# Patient Record
Sex: FEMALE | Race: WHITE | Employment: OTHER | URBAN - METROPOLITAN AREA
[De-identification: names, ages, dates, MRNs, and addresses within clinical notes are randomized per-mention and may not be internally consistent; named-entity substitution may affect disease eponyms.]

---

## 2017-01-17 ENCOUNTER — HOSPITAL ENCOUNTER (OUTPATIENT)
Dept: WOUND CARE | Age: 72
Discharge: HOME | End: 2017-01-17
Payer: MEDICARE

## 2017-01-17 VITALS
WEIGHT: 283 LBS | HEART RATE: 94 BPM | DIASTOLIC BLOOD PRESSURE: 67 MMHG | HEIGHT: 61 IN | TEMPERATURE: 96.2 F | BODY MASS INDEX: 53.43 KG/M2 | RESPIRATION RATE: 18 BRPM | SYSTOLIC BLOOD PRESSURE: 152 MMHG

## 2017-01-17 DIAGNOSIS — I87.2 VENOUS INSUFFICIENCY (CHRONIC) (PERIPHERAL): ICD-10-CM

## 2017-01-17 DIAGNOSIS — I83.019 VENOUS ULCER OF RIGHT LEG (HCC): Chronic | ICD-10-CM

## 2017-01-17 DIAGNOSIS — L97.919 VENOUS ULCER OF RIGHT LEG (HCC): Chronic | ICD-10-CM

## 2017-01-17 PROCEDURE — 87205 SMEAR GRAM STAIN: CPT

## 2017-01-17 PROCEDURE — 87186 SC STD MICRODIL/AGAR DIL: CPT

## 2017-01-17 PROCEDURE — 87147 CULTURE TYPE IMMUNOLOGIC: CPT

## 2017-01-17 PROCEDURE — 99213 OFFICE O/P EST LOW 20 MIN: CPT

## 2017-01-17 PROCEDURE — 87070 CULTURE OTHR SPECIMN AEROBIC: CPT

## 2017-01-17 PROCEDURE — 87077 CULTURE AEROBIC IDENTIFY: CPT

## 2017-01-20 LAB
GRAM STAIN RESULT: ABNORMAL
ORGANISM: ABNORMAL
ORGANISM: ABNORMAL
WOUND/ABSCESS: ABNORMAL
WOUND/ABSCESS: ABNORMAL

## 2017-01-31 ENCOUNTER — HOSPITAL ENCOUNTER (OUTPATIENT)
Dept: WOUND CARE | Age: 72
Discharge: HOME | End: 2017-01-31
Payer: MEDICARE

## 2017-01-31 VITALS
TEMPERATURE: 96.1 F | HEART RATE: 88 BPM | SYSTOLIC BLOOD PRESSURE: 186 MMHG | DIASTOLIC BLOOD PRESSURE: 78 MMHG | RESPIRATION RATE: 20 BRPM

## 2017-01-31 PROCEDURE — 11042 DBRDMT SUBQ TIS 1ST 20SQCM/<: CPT

## 2017-01-31 ASSESSMENT — PAIN DESCRIPTION - ORIENTATION: ORIENTATION: RIGHT

## 2017-01-31 ASSESSMENT — PAIN DESCRIPTION - FREQUENCY: FREQUENCY: OTHER (COMMENT)

## 2017-01-31 ASSESSMENT — PAIN DESCRIPTION - PAIN TYPE: TYPE: ACUTE PAIN

## 2017-01-31 ASSESSMENT — PAIN SCALES - GENERAL: PAINLEVEL_OUTOF10: 5

## 2017-01-31 ASSESSMENT — PAIN DESCRIPTION - LOCATION: LOCATION: LEG

## 2017-02-14 ENCOUNTER — HOSPITAL ENCOUNTER (OUTPATIENT)
Dept: WOUND CARE | Age: 72
Discharge: HOME | End: 2017-02-14
Payer: MEDICARE

## 2017-02-14 VITALS
HEART RATE: 82 BPM | TEMPERATURE: 96.8 F | RESPIRATION RATE: 20 BRPM | DIASTOLIC BLOOD PRESSURE: 65 MMHG | SYSTOLIC BLOOD PRESSURE: 141 MMHG

## 2017-02-14 PROCEDURE — 99213 OFFICE O/P EST LOW 20 MIN: CPT

## 2017-02-14 ASSESSMENT — PAIN DESCRIPTION - ORIENTATION: ORIENTATION: RIGHT

## 2017-02-14 ASSESSMENT — PAIN DESCRIPTION - PAIN TYPE: TYPE: ACUTE PAIN

## 2017-02-14 ASSESSMENT — PAIN DESCRIPTION - DESCRIPTORS: DESCRIPTORS: SHARP

## 2017-02-14 ASSESSMENT — PAIN SCALES - GENERAL: PAINLEVEL_OUTOF10: 6

## 2017-02-14 ASSESSMENT — PAIN DESCRIPTION - LOCATION: LOCATION: LEG

## 2017-02-28 ENCOUNTER — HOSPITAL ENCOUNTER (OUTPATIENT)
Dept: WOUND CARE | Age: 72
Discharge: HOME | End: 2017-02-28
Payer: MEDICARE

## 2017-02-28 VITALS
RESPIRATION RATE: 18 BRPM | DIASTOLIC BLOOD PRESSURE: 77 MMHG | TEMPERATURE: 96.9 F | HEART RATE: 80 BPM | SYSTOLIC BLOOD PRESSURE: 139 MMHG

## 2017-02-28 PROCEDURE — 99213 OFFICE O/P EST LOW 20 MIN: CPT

## 2017-02-28 RX ORDER — ASPIRIN 81 MG/1
81 TABLET ORAL
COMMUNITY
Start: 2011-03-16

## 2017-02-28 RX ORDER — NIACIN 1000 MG
2000 TABLET, EXTENDED RELEASE ORAL
Status: ON HOLD | COMMUNITY
Start: 2017-02-23 | End: 2020-12-07

## 2017-02-28 RX ORDER — CARVEDILOL 25 MG/1
25 TABLET ORAL
Status: ON HOLD | COMMUNITY
Start: 2016-08-08 | End: 2020-12-07

## 2017-02-28 RX ORDER — AMOXICILLIN 500 MG/1
500 CAPSULE ORAL
COMMUNITY
Start: 2016-06-16 | End: 2019-05-07

## 2017-02-28 RX ORDER — FUROSEMIDE 20 MG/1
TABLET ORAL
Status: ON HOLD | COMMUNITY
Start: 2016-11-07 | End: 2020-12-17 | Stop reason: HOSPADM

## 2017-02-28 RX ORDER — ALBUTEROL SULFATE 90 UG/1
1 AEROSOL, METERED RESPIRATORY (INHALATION)
Status: ON HOLD | COMMUNITY
Start: 2015-10-19 | End: 2020-12-07

## 2017-02-28 RX ORDER — AMMONIUM LACTATE 12 G/100G
LOTION TOPICAL
Status: ON HOLD | COMMUNITY
Start: 2016-08-08 | End: 2020-12-07

## 2017-02-28 RX ORDER — LOSARTAN POTASSIUM AND HYDROCHLOROTHIAZIDE 25; 100 MG/1; MG/1
1 TABLET ORAL
Status: ON HOLD | COMMUNITY
Start: 2016-11-14 | End: 2020-12-17 | Stop reason: HOSPADM

## 2017-02-28 ASSESSMENT — PAIN SCALES - GENERAL: PAINLEVEL_OUTOF10: 4

## 2017-02-28 ASSESSMENT — PAIN DESCRIPTION - PAIN TYPE: TYPE: ACUTE PAIN

## 2017-02-28 ASSESSMENT — PAIN DESCRIPTION - DIRECTION: RADIATING_TOWARDS: PINCHING PAIN

## 2017-02-28 ASSESSMENT — PAIN DESCRIPTION - LOCATION: LOCATION: LEG

## 2017-02-28 ASSESSMENT — PAIN DESCRIPTION - ORIENTATION: ORIENTATION: RIGHT

## 2017-03-07 ENCOUNTER — HOSPITAL ENCOUNTER (OUTPATIENT)
Dept: WOUND CARE | Age: 72
Discharge: HOME | End: 2017-03-07
Payer: MEDICARE

## 2017-03-07 VITALS
DIASTOLIC BLOOD PRESSURE: 78 MMHG | RESPIRATION RATE: 18 BRPM | HEIGHT: 61 IN | BODY MASS INDEX: 53.81 KG/M2 | WEIGHT: 285 LBS | SYSTOLIC BLOOD PRESSURE: 180 MMHG | HEART RATE: 87 BPM | TEMPERATURE: 97.6 F

## 2017-03-07 PROCEDURE — 15110 EPIDRM AGRFT T/A/L 1ST 100: CPT

## 2017-03-07 ASSESSMENT — PAIN SCALES - GENERAL: PAINLEVEL_OUTOF10: 5

## 2017-03-07 ASSESSMENT — PAIN DESCRIPTION - ORIENTATION: ORIENTATION: RIGHT

## 2017-03-07 ASSESSMENT — PAIN DESCRIPTION - FREQUENCY: FREQUENCY: INTERMITTENT

## 2017-03-07 ASSESSMENT — PAIN DESCRIPTION - PAIN TYPE: TYPE: CHRONIC PAIN

## 2017-03-07 ASSESSMENT — PAIN DESCRIPTION - LOCATION: LOCATION: LEG

## 2017-03-07 ASSESSMENT — PAIN DESCRIPTION - DESCRIPTORS: DESCRIPTORS: SHARP

## 2017-03-14 ENCOUNTER — HOSPITAL ENCOUNTER (OUTPATIENT)
Dept: WOUND CARE | Age: 72
Discharge: HOME | End: 2017-03-14
Payer: MEDICARE

## 2017-03-14 VITALS
SYSTOLIC BLOOD PRESSURE: 148 MMHG | HEART RATE: 92 BPM | WEIGHT: 285 LBS | TEMPERATURE: 96.3 F | BODY MASS INDEX: 53.81 KG/M2 | HEIGHT: 61 IN | RESPIRATION RATE: 18 BRPM | DIASTOLIC BLOOD PRESSURE: 68 MMHG

## 2017-03-14 PROCEDURE — 99213 OFFICE O/P EST LOW 20 MIN: CPT

## 2017-03-14 ASSESSMENT — PAIN DESCRIPTION - FREQUENCY: FREQUENCY: INTERMITTENT

## 2017-03-14 ASSESSMENT — PAIN DESCRIPTION - DESCRIPTORS: DESCRIPTORS: JABBING;BURNING

## 2017-03-14 ASSESSMENT — PAIN DESCRIPTION - ORIENTATION: ORIENTATION: RIGHT

## 2017-03-14 ASSESSMENT — PAIN DESCRIPTION - LOCATION: LOCATION: LEG

## 2017-03-31 ENCOUNTER — HOSPITAL ENCOUNTER (OUTPATIENT)
Dept: WOUND CARE | Age: 72
Discharge: HOME | End: 2017-03-31
Payer: MEDICARE

## 2017-03-31 VITALS
RESPIRATION RATE: 16 BRPM | SYSTOLIC BLOOD PRESSURE: 125 MMHG | TEMPERATURE: 96.5 F | HEART RATE: 80 BPM | DIASTOLIC BLOOD PRESSURE: 56 MMHG

## 2017-03-31 PROCEDURE — 99213 OFFICE O/P EST LOW 20 MIN: CPT

## 2017-04-04 ENCOUNTER — HOSPITAL ENCOUNTER (OUTPATIENT)
Dept: WOUND CARE | Age: 72
Discharge: HOME | End: 2017-04-04
Payer: MEDICARE

## 2017-04-04 VITALS
SYSTOLIC BLOOD PRESSURE: 188 MMHG | HEART RATE: 73 BPM | TEMPERATURE: 96.1 F | RESPIRATION RATE: 20 BRPM | DIASTOLIC BLOOD PRESSURE: 79 MMHG

## 2017-04-04 PROCEDURE — 99213 OFFICE O/P EST LOW 20 MIN: CPT

## 2017-04-04 ASSESSMENT — PAIN SCALES - GENERAL: PAINLEVEL_OUTOF10: 0

## 2017-04-18 ENCOUNTER — HOSPITAL ENCOUNTER (OUTPATIENT)
Dept: WOUND CARE | Age: 72
Discharge: HOME | End: 2017-04-18
Payer: MEDICARE

## 2017-04-18 VITALS
RESPIRATION RATE: 20 BRPM | DIASTOLIC BLOOD PRESSURE: 62 MMHG | HEART RATE: 84 BPM | SYSTOLIC BLOOD PRESSURE: 127 MMHG | TEMPERATURE: 96.5 F

## 2017-04-18 DIAGNOSIS — E66.09 OBESITY DUE TO EXCESS CALORIES, UNSPECIFIED OBESITY SEVERITY: Chronic | ICD-10-CM

## 2017-04-18 PROCEDURE — 99213 OFFICE O/P EST LOW 20 MIN: CPT

## 2017-04-18 ASSESSMENT — PAIN SCALES - GENERAL: PAINLEVEL_OUTOF10: 0

## 2017-05-02 ENCOUNTER — HOSPITAL ENCOUNTER (OUTPATIENT)
Dept: WOUND CARE | Age: 72
Discharge: HOME | End: 2017-05-02
Payer: MEDICARE

## 2017-05-02 VITALS
BODY MASS INDEX: 53.43 KG/M2 | HEART RATE: 85 BPM | DIASTOLIC BLOOD PRESSURE: 69 MMHG | HEIGHT: 61 IN | TEMPERATURE: 96.2 F | RESPIRATION RATE: 18 BRPM | WEIGHT: 283 LBS | SYSTOLIC BLOOD PRESSURE: 136 MMHG

## 2017-05-02 PROCEDURE — 99213 OFFICE O/P EST LOW 20 MIN: CPT

## 2017-05-02 ASSESSMENT — PAIN SCALES - GENERAL: PAINLEVEL_OUTOF10: 0

## 2017-05-23 ENCOUNTER — HOSPITAL ENCOUNTER (OUTPATIENT)
Dept: WOUND CARE | Age: 72
Discharge: HOME | End: 2017-05-23
Payer: MEDICARE

## 2017-05-23 VITALS
RESPIRATION RATE: 18 BRPM | SYSTOLIC BLOOD PRESSURE: 180 MMHG | DIASTOLIC BLOOD PRESSURE: 80 MMHG | HEIGHT: 61 IN | TEMPERATURE: 96.6 F | WEIGHT: 283 LBS | HEART RATE: 77 BPM | BODY MASS INDEX: 53.43 KG/M2

## 2017-05-23 PROCEDURE — 99213 OFFICE O/P EST LOW 20 MIN: CPT

## 2017-05-23 ASSESSMENT — PAIN SCALES - GENERAL: PAINLEVEL_OUTOF10: 0

## 2017-06-06 ENCOUNTER — HOSPITAL ENCOUNTER (OUTPATIENT)
Dept: WOUND CARE | Age: 72
Discharge: HOME | End: 2017-06-06
Payer: MEDICARE

## 2017-06-06 VITALS — TEMPERATURE: 96.1 F | HEART RATE: 81 BPM | RESPIRATION RATE: 18 BRPM

## 2017-06-06 PROCEDURE — 87147 CULTURE TYPE IMMUNOLOGIC: CPT

## 2017-06-06 PROCEDURE — 87205 SMEAR GRAM STAIN: CPT

## 2017-06-06 PROCEDURE — 87186 SC STD MICRODIL/AGAR DIL: CPT

## 2017-06-06 PROCEDURE — 87070 CULTURE OTHR SPECIMN AEROBIC: CPT

## 2017-06-06 PROCEDURE — 87077 CULTURE AEROBIC IDENTIFY: CPT

## 2017-06-06 PROCEDURE — 11042 DBRDMT SUBQ TIS 1ST 20SQCM/<: CPT

## 2017-06-06 ASSESSMENT — PAIN SCALES - GENERAL: PAINLEVEL_OUTOF10: 0

## 2017-06-09 LAB
GRAM STAIN RESULT: ABNORMAL
ORGANISM: ABNORMAL
WOUND/ABSCESS: ABNORMAL

## 2017-06-13 ENCOUNTER — TELEPHONE (OUTPATIENT)
Dept: WOUND CARE | Age: 72
End: 2017-06-13

## 2017-06-13 RX ORDER — GENTAMICIN SULFATE 1 MG/G
OINTMENT TOPICAL
Qty: 30 G | Refills: 1 | Status: SHIPPED | OUTPATIENT
Start: 2017-06-13 | End: 2017-06-20

## 2017-06-20 ENCOUNTER — HOSPITAL ENCOUNTER (OUTPATIENT)
Dept: WOUND CARE | Age: 72
Discharge: HOME | End: 2017-06-20
Payer: MEDICARE

## 2017-06-20 VITALS
DIASTOLIC BLOOD PRESSURE: 82 MMHG | HEART RATE: 72 BPM | SYSTOLIC BLOOD PRESSURE: 118 MMHG | TEMPERATURE: 96.9 F | RESPIRATION RATE: 18 BRPM

## 2017-06-20 PROCEDURE — 99213 OFFICE O/P EST LOW 20 MIN: CPT

## 2017-06-20 ASSESSMENT — PAIN SCALES - GENERAL: PAINLEVEL_OUTOF10: 0

## 2017-07-11 ENCOUNTER — HOSPITAL ENCOUNTER (OUTPATIENT)
Dept: WOUND CARE | Age: 72
Discharge: HOME | End: 2017-07-11
Payer: MEDICARE

## 2017-07-11 VITALS
DIASTOLIC BLOOD PRESSURE: 57 MMHG | HEIGHT: 61 IN | BODY MASS INDEX: 53.43 KG/M2 | HEART RATE: 71 BPM | TEMPERATURE: 96.6 F | RESPIRATION RATE: 18 BRPM | SYSTOLIC BLOOD PRESSURE: 118 MMHG | WEIGHT: 283 LBS

## 2017-07-11 PROCEDURE — 99213 OFFICE O/P EST LOW 20 MIN: CPT

## 2017-07-11 PROCEDURE — G0463 HOSPITAL OUTPT CLINIC VISIT: HCPCS

## 2017-07-11 ASSESSMENT — PAIN SCALES - GENERAL: PAINLEVEL_OUTOF10: 0

## 2017-08-01 ENCOUNTER — HOSPITAL ENCOUNTER (OUTPATIENT)
Dept: WOUND CARE | Age: 72
Discharge: HOME | End: 2017-08-01
Payer: MEDICARE

## 2017-08-01 VITALS
TEMPERATURE: 97.1 F | WEIGHT: 283 LBS | RESPIRATION RATE: 16 BRPM | HEART RATE: 71 BPM | SYSTOLIC BLOOD PRESSURE: 163 MMHG | BODY MASS INDEX: 53.43 KG/M2 | DIASTOLIC BLOOD PRESSURE: 71 MMHG | HEIGHT: 61 IN

## 2017-08-01 PROCEDURE — 99213 OFFICE O/P EST LOW 20 MIN: CPT

## 2017-08-01 ASSESSMENT — PAIN SCALES - GENERAL: PAINLEVEL_OUTOF10: 0

## 2017-08-15 ENCOUNTER — HOSPITAL ENCOUNTER (OUTPATIENT)
Dept: WOUND CARE | Age: 72
Discharge: HOME | End: 2017-08-15
Payer: MEDICARE

## 2017-08-15 VITALS
HEART RATE: 85 BPM | HEIGHT: 61 IN | BODY MASS INDEX: 53.43 KG/M2 | WEIGHT: 283 LBS | SYSTOLIC BLOOD PRESSURE: 123 MMHG | RESPIRATION RATE: 18 BRPM | TEMPERATURE: 97.2 F | DIASTOLIC BLOOD PRESSURE: 62 MMHG

## 2017-08-15 PROCEDURE — 87205 SMEAR GRAM STAIN: CPT

## 2017-08-15 PROCEDURE — 87070 CULTURE OTHR SPECIMN AEROBIC: CPT

## 2017-08-15 PROCEDURE — 99213 OFFICE O/P EST LOW 20 MIN: CPT

## 2017-08-15 PROCEDURE — 87186 SC STD MICRODIL/AGAR DIL: CPT

## 2017-08-15 PROCEDURE — 87077 CULTURE AEROBIC IDENTIFY: CPT

## 2017-08-15 PROCEDURE — 87147 CULTURE TYPE IMMUNOLOGIC: CPT

## 2017-08-15 ASSESSMENT — PAIN SCALES - GENERAL: PAINLEVEL_OUTOF10: 0

## 2017-08-17 LAB
GRAM STAIN RESULT: ABNORMAL
ORGANISM: ABNORMAL
WOUND/ABSCESS: ABNORMAL

## 2017-08-18 ENCOUNTER — TELEPHONE (OUTPATIENT)
Dept: WOUND CARE | Age: 72
End: 2017-08-18

## 2017-08-18 RX ORDER — GENTAMICIN SULFATE 1 MG/G
OINTMENT TOPICAL
Qty: 30 G | Refills: 1 | Status: SHIPPED | OUTPATIENT
Start: 2017-08-18 | End: 2017-08-25

## 2017-08-29 ENCOUNTER — HOSPITAL ENCOUNTER (OUTPATIENT)
Dept: WOUND CARE | Age: 72
Discharge: HOME | End: 2017-08-29
Payer: MEDICARE

## 2017-08-29 VITALS
SYSTOLIC BLOOD PRESSURE: 126 MMHG | DIASTOLIC BLOOD PRESSURE: 60 MMHG | TEMPERATURE: 98.2 F | RESPIRATION RATE: 18 BRPM | HEART RATE: 80 BPM

## 2017-08-29 PROCEDURE — 99213 OFFICE O/P EST LOW 20 MIN: CPT

## 2017-09-12 ENCOUNTER — HOSPITAL ENCOUNTER (OUTPATIENT)
Dept: WOUND CARE | Age: 72
Discharge: HOME | End: 2017-09-12
Payer: MEDICARE

## 2017-09-12 VITALS
TEMPERATURE: 96.4 F | SYSTOLIC BLOOD PRESSURE: 102 MMHG | DIASTOLIC BLOOD PRESSURE: 50 MMHG | RESPIRATION RATE: 18 BRPM | HEART RATE: 80 BPM

## 2017-09-12 PROCEDURE — 99213 OFFICE O/P EST LOW 20 MIN: CPT

## 2017-09-26 ENCOUNTER — HOSPITAL ENCOUNTER (OUTPATIENT)
Dept: WOUND CARE | Age: 72
Discharge: HOME | End: 2017-09-26
Payer: MEDICARE

## 2017-09-26 VITALS
HEART RATE: 87 BPM | DIASTOLIC BLOOD PRESSURE: 65 MMHG | SYSTOLIC BLOOD PRESSURE: 145 MMHG | TEMPERATURE: 97.9 F | RESPIRATION RATE: 18 BRPM

## 2017-09-26 PROCEDURE — 99213 OFFICE O/P EST LOW 20 MIN: CPT

## 2017-10-17 ENCOUNTER — HOSPITAL ENCOUNTER (OUTPATIENT)
Dept: WOUND CARE | Age: 72
Discharge: HOME | End: 2017-10-17
Payer: MEDICARE

## 2017-10-17 VITALS
SYSTOLIC BLOOD PRESSURE: 123 MMHG | TEMPERATURE: 96.5 F | RESPIRATION RATE: 20 BRPM | HEART RATE: 74 BPM | DIASTOLIC BLOOD PRESSURE: 60 MMHG | BODY MASS INDEX: 53.43 KG/M2 | WEIGHT: 283 LBS | HEIGHT: 61 IN

## 2017-10-17 PROCEDURE — 11042 DBRDMT SUBQ TIS 1ST 20SQCM/<: CPT

## 2017-10-17 ASSESSMENT — PAIN DESCRIPTION - FREQUENCY: FREQUENCY: INTERMITTENT

## 2017-10-17 ASSESSMENT — PAIN DESCRIPTION - ORIENTATION: ORIENTATION: RIGHT

## 2017-10-17 ASSESSMENT — PAIN DESCRIPTION - LOCATION: LOCATION: ANKLE;LEG

## 2017-10-17 ASSESSMENT — PAIN SCALES - GENERAL: PAINLEVEL_OUTOF10: 3

## 2017-10-17 ASSESSMENT — PAIN DESCRIPTION - DESCRIPTORS: DESCRIPTORS: OTHER (COMMENT)

## 2017-10-17 ASSESSMENT — PAIN DESCRIPTION - PAIN TYPE: TYPE: CHRONIC PAIN

## 2017-10-17 NOTE — CODE DOCUMENTATION
[address] Physician Billing Sheet. Abby Thrasher  AGE: 71 y.o.    GENDER: female  : 1945  TODAY'S DATE:  10/17/2017    ICD-10 2000 [facility] [address] Problems    Diagnosis Date Noted    Obesity due to excess calories [E66.09] 2017    Venous ulcer of right leg ([address] [I83.019] 2017    Venous insufficiency (chronic) (peripheral) [I87.2] 2017    Edema leg [R60.0] 2010       PHYSICIAN PROCEDURES    CPT CODE  94322 RT      Electronically signed by Altagracia Berrios DPM on 10/17/2017 at 4:39 PM

## 2017-10-17 NOTE — PROGRESS NOTES
[address]                                                   Progress Note and Procedure Note      Maggi Waters  MEDICAL RECORD NUMBER:  [id number]  AGE: 71 y.o. GENDER: female  : 1945  EPISODE DATE:  10/17/2017    Subjective:     Chief Complaint   Patient presents with    Wound Check     right leg and ankle wounds         HISTORY of PRESENT ILLNESS HPI     Maggi Waters is a 71 y.o. female who presents today for wound/ulcer evaluation. History of Wound Context: Non-healing venous stasis ulcers of the right lateral leg  Wound/Ulcer Pain Timing/Severity: intermittent  Quality of pain: aching  Severity:  3 / 10   Modifying Factors: Pain worsens with walking  Associated Signs/Symptoms: edema and drainage    Ulcer Identification:  Ulcer Type: venous  Contributing Factors: edema and obesity    Wound: N/A        PAST MEDICAL HISTORY        Diagnosis Date    Hyperlipidemia     Hypertension     Osteoarthritis     Type II or unspecified type diabetes mellitus without mention of complication, not stated as uncontrolled        PAST SURGICAL HISTORY    Past Surgical History:   Procedure Laterality Date    BREAST SURGERY      HERNIA REPAIR      HYSTERECTOMY         FAMILY HISTORY    History reviewed. No pertinent family history.     SOCIAL HISTORY    Social History   Substance Use Topics    Smoking status: Former Smoker    Smokeless tobacco: Former User    Alcohol use No       ALLERGIES    Allergies   Allergen Reactions    Rocephin [Ceftriaxone Sodium]     Clindamycin Hives    Clindamycin/Lincomycin Rash    Minocycline Rash    Tetracyclines & Related Swelling     allergic to Minocin    Ceftriaxone Rash       MEDICATIONS    Current Outpatient Prescriptions on File Prior to Encounter   Medication Sig Dispense Refill    albuterol sulfate HFA (PROAIR HFA) 108 (90 BASE) MCG/ACT inhaler 1 puff by Other route      ammonium lactate (LAC-HYDRIN) 12 % lotion apply to dry area on feet/legs twice a Discharge/Physician Orders:      Wound Location: Right Lower Leg wounds      Dressing orders: 1. Cleanse wound(s) with normal saline. 2. Apply Gentamycin Ointment then DRY Promogran, then AquaCel extra then dry dressing. 3. Change dressing three times per week. 4. May apply lotion to leg - do not get in wound bed.      Keep all dressings clean & dry. Do not shower, take baths or get wound wet, unless otherwise instructed by your Wound Care doctor.       Other Instructions: Apply 10-20mmHg compression hose to bilateral lower leg(s), may remove at bedtime, reapply first thing in the morning, avoid prolonged standing, elevate legs when sitting.      Follow up visit:  3 weeks         Keep next scheduled appointment. Clara Roche give 24 hour notice if unable to keep appointment. [phone]      If you experience any of the following, please call the Wound Care Service during business hours: [phone]    *Increase in pain   *Temperature over 101    *Increase in drainage from your wound or a foul odor   *Uncontrolled swelling Need for compression bandage changes due to slippage, breakthrough drainage      If you need medical attention outside of business hours, please contact your Primary Care Doctor or go to the nearest emergency room. Wound Care Center Information: Should you experience any significant changes in your wound(s) or have questions about your wound care, please contact the [address] [phone] Monday 8:30 - 12:30PM, Tuesday - Thursday 8:30 - 5:00PM AND Friday 8:30 - 12:30PM.      Patient Signature:_______________________   Main [place]  Nurse Signature:_______________________  Main [place]  Date: ___________ Time: ____________  [facility]  PLEASE NOTE: IF YOU ARE UNABLE TO OBTAIN WOUND SUPPLIES, CONTINUE TO USE THE SUPPLIES YOU HAVE AVAILABLE UNTIL YOU ARE ABLE TO [address] [facility].  IT IS MOST IMPORTANT TO KEEP THE WOUND COVERED AT ALL TIMES   Electronically signed by Virginie Rodriguez DPM on 10/17/2017 at 3:55

## 2017-11-14 ENCOUNTER — HOSPITAL ENCOUNTER (OUTPATIENT)
Dept: WOUND CARE | Age: 72
Discharge: HOME | End: 2017-11-14
Payer: MEDICARE

## 2017-11-14 VITALS
HEART RATE: 83 BPM | DIASTOLIC BLOOD PRESSURE: 60 MMHG | TEMPERATURE: 96 F | SYSTOLIC BLOOD PRESSURE: 123 MMHG | RESPIRATION RATE: 20 BRPM

## 2017-11-14 PROCEDURE — 99213 OFFICE O/P EST LOW 20 MIN: CPT

## 2017-11-14 NOTE — CODE DOCUMENTATION
[address] Physician Billing Sheet. Abby Thrasher  AGE: 71 y.o.    GENDER: female  : 1945  TODAY'S DATE:  2017    ICD-10 [address] [facility] [address] Problems    Diagnosis Date Noted    Obesity due to excess calories [E66.09] 2017    Venous ulcer of right leg ([address] [I83.019] 2017    Venous insufficiency (chronic) (peripheral) [I87.2] 2017    Edema leg [R60.0] 2010       PHYSICIAN PROCEDURES    CPT CODE  11498      Electronically signed by Jose Wayne DPM on 2017 at 3:55 PM

## 2017-11-14 NOTE — PROGRESS NOTES
[address]   Progress Note and Procedure Note      Brenton Lloyd  MEDICAL RECORD NUMBER:  [id number]  AGE: 71 y.o. GENDER: female  : 1945  EPISODE DATE:  2017    Subjective:     Chief Complaint   Patient presents with    Wound Check     right leg wounds         HISTORY of PRESENT ILLNESS HPI     Brenton Lloyd is a 71 y.o. female who presents today for wound/ulcer evaluation. The wound is improving. History of Wound Context: Non=healing venous stasis ulcer of the right leg Wound/Ulcer Pain Timing/Severity: intermittent  Quality of pain: aching  Severity:  3 / 10   Modifying Factors: Pain worsens with walking  Associated Signs/Symptoms: edema    Ulcer Identification:  Ulcer Type: venous  Contributing Factors: edema    Wound: N/A        PAST MEDICAL HISTORY        Diagnosis Date    Hyperlipidemia     Hypertension     Osteoarthritis     Type II or unspecified type diabetes mellitus without mention of complication, not stated as uncontrolled        PAST SURGICAL HISTORY    Past Surgical History:   Procedure Laterality Date    BREAST SURGERY      HERNIA REPAIR      HYSTERECTOMY         FAMILY HISTORY    History reviewed. No pertinent family history.     SOCIAL HISTORY    Social History   Substance Use Topics    Smoking status: Former Smoker    Smokeless tobacco: Former User    Alcohol use No       ALLERGIES    Allergies   Allergen Reactions    Rocephin [Ceftriaxone Sodium]     Clindamycin Hives    Clindamycin/Lincomycin Rash    Minocycline Rash    Tetracyclines & Related Swelling     allergic to Minocin    Ceftriaxone Rash       MEDICATIONS    Current Outpatient Prescriptions on File Prior to Encounter   Medication Sig Dispense Refill    albuterol sulfate HFA (PROAIR HFA) 108 (90 BASE) MCG/ACT inhaler 1 puff by Other route      ammonium lactate (LAC-HYDRIN) 12 % lotion apply to dry area on feet/legs twice a day      amoxicillin (AMOXIL) 500 MG capsule Take 500 mg by mouth  aspirin (ECOTRIN LOW STRENGTH) 81 MG EC tablet Take 81 mg by mouth      carvedilol (COREG) 25 MG tablet Take 25 mg by mouth      furosemide (LASIX) 20 MG tablet TAKE 2 TABLETS EVERY MORNING AND 1 TABLET EVERY EVENING      insulin glargine (LANTUS SOLOSTAR) 100 UNIT/ML injection pen INJECT 22 UNITS AT BEDTIME      insulin lispro (HUMALOG KWIKPEN) 100 UNIT/ML pen Inject 6 to 8 units before breakfast, 8 units with lunch and 12 units at dinner      losartan-hydrochlorothiazide (HYZAAR) 100-25 MG per tablet Take 1 tablet by mouth      Niacin ER 1000 MG TBCR Take 2,000 mg by mouth      SODIUM CHLORIDE, EXTERNAL, 0.9 % SOLN Apply 1 Applicatorful topically daily 1000 mL 2    amoxicillin (AMOXIL) 500 MG capsule Take 500 mg by mouth daily. No current facility-administered medications on file prior to encounter. REVIEW OF SYSTEMS    Pertinent items are noted in HPI. Objective:      /60   Pulse 83   Temp 96 °F (35.6 °C) (Oral)   Resp 20     Wt Readings from Last 3 Encounters:   10/17/17 283 lb (128.4 kg)   08/15/17 283 lb (128.4 kg)   08/01/17 283 lb (128.4 kg)       PHYSICAL EXAM    Constitutional:   Well nourished and well developed. Appears neat and clean. Patient is alert, oriented x3, and in no apparent distress. Respiratory:  Respiratory effort is easy and symmetric bilaterally. Rate is normal at rest and on room air. Vascular:  Pedal Pulses is palpable and audible signal noted with doppler. Capillary refill is <5 sec to digits bilateral.  Extremities negative for pitting edema. Neurological:  Gross and Light touch intact. Protective sensation intact. Patient has pain intermittent in the back of the leg but no homahan's sign. Dermatological:  Wound description noted in wound assessment. The wound is improving in size and depth. A healthy granular base is noted. The [place] wound area is intact without any erythema or warmth noted.  No signs or symptoms of acute infection noted. Psychiatric:  Judgement and insight intact. Short and long term memory intact. No evidence of depression, anxiety, or agitation. Patient is calm, cooperative, and communicative. Appropriate interactions and affect. Assessment:      Patient Active Problem List   Diagnosis Code    Venous insufficiency (chronic) (peripheral) I87.2    Venous ulcer of right leg ([facility]) I83.019    Edema leg R60.0    Obesity due to excess calories E66.09        Procedure Note  Indications:  Based on my examination of this patient's wound(s)/ulcer(s) today, debridement is not required to promote healing and evaluate the wound base. Plan:     Advised patient that she should see PCP or go to ED if she notices worsening of her current rash or any constitutional symptoms. Treatment Note please see attached Discharge Instructions    In my professional opinion this patient would benefit from HBO Therapy: No    Written patient dismissal instructions given to patient and signed by patient or POA. Discharge Instructions       Visit Discharge/Physician Orders:      Wound Location: Right Lower Leg wounds      Dressing orders: 1. Cleanse wound(s) with normal saline. 2. Apply Gentamycin Ointment then DRY Promogran, then AquaCel extra then dry dressing. 3. Change dressing three times per week. 4. May apply lotion to leg - do not get in wound bed.      Keep all dressings clean & dry. Do not shower, take baths or get wound wet, unless otherwise instructed by your Wound Care doctor.       Other Instructions: Apply 10-20mmHg compression hose to bilateral lower leg(s), may remove at bedtime, reapply first thing in the morning, avoid prolonged standing, elevate legs when sitting.      Follow up visit:  3 weeks         Keep next scheduled appointment. Elayne Fitzpatrick give 24 hour notice if unable to keep appointment.  [phone]      If you experience any of the following, please call the Wound Care Service during

## 2018-01-16 ENCOUNTER — HOSPITAL ENCOUNTER (OUTPATIENT)
Dept: WOUND CARE | Age: 73
Discharge: HOME | End: 2018-01-16
Payer: MEDICARE

## 2018-01-16 VITALS
HEART RATE: 77 BPM | DIASTOLIC BLOOD PRESSURE: 56 MMHG | TEMPERATURE: 96.3 F | SYSTOLIC BLOOD PRESSURE: 110 MMHG | RESPIRATION RATE: 18 BRPM

## 2018-01-16 PROCEDURE — 99212 OFFICE O/P EST SF 10 MIN: CPT

## 2018-01-16 NOTE — PROGRESS NOTES
SERVE YOU.  PLEASE CALL IF YOU DEVELOP ANOTHER WOUND. [phone]   Electronically signed by Naina Sanchez DPM on 1/16/2018 at 3:35 PM                        Electronically signed by Naina Sanchez DPM on 1/16/2018 at 3:36 PM

## 2019-05-07 ENCOUNTER — HOSPITAL ENCOUNTER (OUTPATIENT)
Dept: WOUND CARE | Age: 74
Discharge: HOME | End: 2019-05-07
Payer: MEDICARE

## 2019-05-07 VITALS
SYSTOLIC BLOOD PRESSURE: 120 MMHG | HEART RATE: 90 BPM | BODY MASS INDEX: 50.79 KG/M2 | WEIGHT: 269 LBS | HEIGHT: 61 IN | DIASTOLIC BLOOD PRESSURE: 66 MMHG | TEMPERATURE: 97.3 F | RESPIRATION RATE: 18 BRPM

## 2019-05-07 DIAGNOSIS — I89.0 LYMPHEDEMA OF BOTH LOWER EXTREMITIES: Chronic | ICD-10-CM

## 2019-05-07 DIAGNOSIS — I89.0 ELEPHANTIASIS NOSTRA VERRUCOSA: Chronic | ICD-10-CM

## 2019-05-07 PROCEDURE — 99211 OFF/OP EST MAY X REQ PHY/QHP: CPT

## 2019-05-07 ASSESSMENT — PAIN SCALES - GENERAL: PAINLEVEL_OUTOF10: 0

## 2019-05-07 NOTE — CODE DOCUMENTATION
[address] Physician Billing Sheet. Abby Thrasher  AGE: 69 y.o.    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

## 2019-05-07 NOTE — FLOWSHEET NOTE
PATIENT HAS NO OPEN WOUNDS, NO DRESSING APPLIED, PATIENT HAS HER OWN COMPRESSION HOSE ON BILATERAL LOWER EXTREMITIES.

## 2019-05-07 NOTE — PROGRESS NOTES
[address]   Progress Note and Procedure Note      Shashank Bauer  MEDICAL RECORD NUMBER:  [id number]  AGE: 69 y.o. GENDER: female  : 1945  EPISODE DATE:  2019    Subjective:     Chief Complaint   Patient presents with    Wound Check     left leg         HISTORY of PRESENT ILLNESS HPI     Shashank Bauer is a 69 y.o. female who presents today for wound/ulcer evaluation. The wound is healed. History of Wound Context: Non=healing lymphedema with verrucous like nodules of the upper left thigh. She is here for a second opinion after seeing [facility] dermatology with no solution. Wound/Ulcer Pain Timing/Severity: intermittent  Quality of pain: aching  Severity:  3 / 10   Modifying Factors: Pain worsens with walking  Associated Signs/Symptoms: edema    Ulcer Identification:  Ulcer Type: venous  Contributing Factors: edema    Wound: N/A        PAST MEDICAL HISTORY        Diagnosis Date    Hyperlipidemia     Hypertension     Osteoarthritis     Type II or unspecified type diabetes mellitus without mention of complication, not stated as uncontrolled        PAST SURGICAL HISTORY    Past Surgical History:   Procedure Laterality Date    BREAST SURGERY      HERNIA REPAIR      HYSTERECTOMY         FAMILY HISTORY    History reviewed. No pertinent family history.     SOCIAL HISTORY    Social History     Tobacco Use    Smoking status: Former Smoker    Smokeless tobacco: Former User   Substance Use Topics    Alcohol use: No    Drug use: No       ALLERGIES    Allergies   Allergen Reactions    Rocephin [Ceftriaxone Sodium]     Clindamycin Hives    Clindamycin/Lincomycin Rash    Minocycline Rash    Tetracyclines & Related Swelling     allergic to Minocin    Ceftriaxone Rash       MEDICATIONS    Current Outpatient Medications on File Prior to Encounter   Medication Sig Dispense Refill    albuterol sulfate HFA (PROAIR HFA) 108 (90 BASE) MCG/ACT inhaler 1 puff by Other route      ammonium lactate I have personally taken the history and examined this patient and agree with the resident's note as stated above with the following thoughts:    Start some lexapro-- discussed counseling.  Discuss possible side-effects.       (LAC-HYDRIN) 12 % lotion apply to dry area on feet/legs twice a day      aspirin (ECOTRIN LOW STRENGTH) 81 MG EC tablet Take 81 mg by mouth      carvedilol (COREG) 25 MG tablet Take 25 mg by mouth      furosemide (LASIX) 20 MG tablet TAKE 2 TABLETS EVERY MORNING AND 1 TABLET EVERY EVENING      insulin glargine (LANTUS SOLOSTAR) 100 UNIT/ML injection pen INJECT 22 UNITS AT BEDTIME      insulin lispro (HUMALOG KWIKPEN) 100 UNIT/ML pen Inject 6 to 8 units before breakfast, 8 units with lunch and 12 units at dinner      losartan-hydrochlorothiazide (HYZAAR) 100-25 MG per tablet Take 1 tablet by mouth      Niacin ER 1000 MG TBCR Take 2,000 mg by mouth      SODIUM CHLORIDE, EXTERNAL, 0.9 % SOLN Apply 1 Applicatorful topically daily 1000 mL 2    amoxicillin (AMOXIL) 500 MG capsule Take 500 mg by mouth daily. No current facility-administered medications on file prior to encounter. REVIEW OF SYSTEMS    Pertinent items are noted in HPI. Objective:      /66   Pulse 90   Temp 97.3 °F (36.3 °C) (Temporal)   Resp 18   Ht 5' 1\" (1.549 m)   Wt 269 lb (122 kg)   BMI 50.83 kg/m²     Wt Readings from Last 3 Encounters:   05/07/19 269 lb (122 kg)   10/17/17 283 lb (128.4 kg)   08/15/17 283 lb (128.4 kg)       PHYSICAL EXAM    Constitutional:   Well nourished and well developed. Appears neat and clean. Patient is alert, oriented x3, and in no apparent distress. Respiratory:  Respiratory effort is easy and symmetric bilaterally. Rate is normal at rest and on room air. Vascular:  Pedal Pulses is palpable and audible signal noted with doppler. Capillary refill is <5 sec to digits bilateral.  Extremities negative for pitting edema. Neurological:  Gross and Light touch intact. Protective sensation intact. Dermatological:  Wound description noted in wound assessment. The back of the leg has bullbous almost veruccous like formations on the back of the left thigh.     Judgement and insight intact. Short and long term memory intact. No evidence of depression, anxiety, or agitation. Patient is calm, cooperative, and communicative. Appropriate interactions and affect. 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 Plan:     Lymphedema pumps Ordered  Patient will be referred to Dr. Piter Armstrong  Treatment Note please see attached Discharge Instructions    In my professional opinion this patient would benefit from HBO Therapy: No    Written patient dismissal instructions given to patient and signed by patient or POA. Discharge Instructions       [facility]    Physician Orders and Discharge Instructions  [place] [address] [facility]  Telephone: [phone]      [facility], located within [facility] [phone]      NAME:  Niles Hillman  YOB: 1945  MEDICAL RECORD NUMBER:  [id number]    Home Care:  None     Wound Location: NONE    Dressing orders: NONE    Compression:Apply 10-20MMhG compression hose to  BILATERAL lower leg(s), may remove at bedtime, reapply first thing in the morning, avoid prolonged standing, elevate legs when sitting. Other Instructions: referral to [facility] to be done, TO SEE DR. CRAIN FOR EVALUATION. Keep all dressings clean & dry. Keep pressure off the wound(s) at all times. Do not shower, take baths or get wound wet, unless otherwise instructed by your Wound Care doctor.     Follow up visit   WITH DR. Cee Conteh MAY 15, 2019 AT 9:15AM    For Diabetic patients keep blood sugars below 150 for optimal wound healing. If you experience any of the following, please call the Wound Care Service during business hours: [phone]     *Increase in pain *Temperature over 101 *Increase in drainage from your wound or a foul odor  *Uncontrolled swelling *Need for compression bandage changes due to slippage, breakthrough drainage      If you need medical attention outside of business hours, please contact your Primary Care Doctor or go to the nearest emergency room. Keep next scheduled appointment. Please give 24 hour notice if unable to keep appointment. [phone]    PLEASE NOTE: IF YOU ARE UNABLE TO OBTAIN WOUND SUPPLIES, CONTINUE TO USE THE SUPPLIES YOU HAVE AVAILABLE UNTIL YOU ARE ABLE TO REACH US.  IT IS MOST IMPORTANT TO KEEP THE WOUND COVERED AT ALL TIMES                Electronically signed by Donte Bowie DPM on 5/7/2019 at 3:12 PM

## 2019-05-15 ENCOUNTER — HOSPITAL ENCOUNTER (OUTPATIENT)
Dept: WOUND CARE | Age: 74
Discharge: HOME | End: 2019-05-15
Payer: MEDICARE

## 2019-05-15 VITALS
HEART RATE: 82 BPM | RESPIRATION RATE: 18 BRPM | DIASTOLIC BLOOD PRESSURE: 54 MMHG | TEMPERATURE: 98.9 F | SYSTOLIC BLOOD PRESSURE: 121 MMHG

## 2019-05-15 DIAGNOSIS — D49.2 SKIN GROWTH: ICD-10-CM

## 2019-05-15 PROCEDURE — 99212 OFFICE O/P EST SF 10 MIN: CPT

## 2019-05-15 NOTE — PROGRESS NOTES
[address] Brent[address] Louis Physician Billing Sheet. Abby Thrasher  AGE: 69 y.o.    GENDER: female  : 1945  TODAY'S DATE:  5/15/2019    ICD-10 [address] [facility] [address] Problems    Diagnosis Date Noted    Skin growth [D49.2] 05/15/2019       PHYSICIAN PROCEDURES    CPT CODES    27739    Electronically signed by Dorothy Omer MD on 5/15/2019 at 10:35 AM

## 2019-05-15 NOTE — PROGRESS NOTES
[facility]   Progress Note and Procedure Note      Adenike Dunbar  MEDICAL RECORD NUMBER:  [id number]  AGE: 69 y.o. GENDER: female  : 1945  EPISODE DATE:  5/15/2019    Subjective:     Chief Complaint   Patient presents with    Wound Check     LEFT POSTERIOR KNEE AREA        HISTORY of PRESENT ILLNESS HPI     Adenike Dunbar is a 69 y.o. female who presents today  for wound/ulcer evaluation. History of Wound Context: skin growth ulcer  Wound/Ulcer Pain Timing/Severity: none  Quality of pain: N/A  Severity:  0 / 10   Modifying Factors: None  Associated Signs/Symptoms: edema   multple skin growth on Lt popleteal area    Ulcer Identification:  Ulcer Type: none  Contributing Factors: lymphedema, shear force and obesity    Wound: N/A        PAST MEDICAL HISTORY        Diagnosis Date    Hyperlipidemia     Hypertension     Osteoarthritis     Type II or unspecified type diabetes mellitus without mention of complication, not stated as uncontrolled        PAST SURGICAL HISTORY    Past Surgical History:   Procedure Laterality Date    BREAST SURGERY      HERNIA REPAIR      HYSTERECTOMY         FAMILY HISTORY    History reviewed. No pertinent family history.     SOCIAL HISTORY    Social History     Tobacco Use    Smoking status: Former Smoker    Smokeless tobacco: Former User   Substance Use Topics    Alcohol use: No    Drug use: No       ALLERGIES    Allergies   Allergen Reactions    Rocephin [Ceftriaxone Sodium]     Clindamycin Hives    Clindamycin/Lincomycin Rash    Minocycline Rash    Tetracyclines & Related Swelling     allergic to Minocin    Ceftriaxone Rash       MEDICATIONS    Current Outpatient Medications on File Prior to Encounter   Medication Sig Dispense Refill    albuterol sulfate HFA (PROAIR HFA) 108 (90 BASE) MCG/ACT inhaler 1 puff by Other route      ammonium lactate (LAC-HYDRIN) 12 % lotion apply to dry area on feet/legs twice a day      aspirin (ECOTRIN LOW STRENGTH) 81 MG EC tablet Take 81 mg by mouth      carvedilol (COREG) 25 MG tablet Take 25 mg by mouth      furosemide (LASIX) 20 MG tablet TAKE 2 TABLETS EVERY MORNING AND 1 TABLET EVERY EVENING      insulin glargine (LANTUS SOLOSTAR) 100 UNIT/ML injection pen INJECT 22 UNITS AT BEDTIME      insulin lispro (HUMALOG KWIKPEN) 100 UNIT/ML pen Inject 6 to 8 units before breakfast, 8 units with lunch and 12 units at dinner      losartan-hydrochlorothiazide (HYZAAR) 100-25 MG per tablet Take 1 tablet by mouth      Niacin ER 1000 MG TBCR Take 2,000 mg by mouth      SODIUM CHLORIDE, EXTERNAL, 0.9 % SOLN Apply 1 Applicatorful topically daily 1000 mL 2    amoxicillin (AMOXIL) 500 MG capsule Take 500 mg by mouth daily. No current facility-administered medications on file prior to encounter. REVIEW OF SYSTEMS    Pertinent items are noted in HPI. Objective:      BP (!) 121/54   Pulse 82   Temp 98.9 °F (37.2 °C) (Temporal)   Resp 18     Wt Readings from Last 3 Encounters:   05/07/19 269 lb (122 kg)   10/17/17 283 lb (128.4 kg)   08/15/17 283 lb (128.4 kg)       PHYSICAL EXAM    Patient awake, alert and oriented x3 and in no acute distress. Vascular exam: peripheral pulses symmetrical, present. +2 edema noted to bilaterally LE. Neurological exam: ache. Dermatological exam:  See below for ulcer location, description and size. Assessment:            Pt. Has multiple sin growth( mushroon like ), which   get irritated with clothing. Saw a dermatologist, who told her that the reazon that he does not do it, is becouse the     \" potensial for bleeding\"                Active Hospital Problems    Diagnosis Date Noted    Skin growth [D49.2] 05/15/2019        Procedure Note  Indications:  Based on my examination of this patient's wound(s)/ulcer(s) today, debridement is not required to promote healing and evaluate the wound base. Plan:                   Will schedule excisional biopsy of the lessions     On Lt. popleteal area. Pt. Requested to be done after mid June        Treatment Note please see attached Discharge Instructions    In my professional opinion this patient would benefit from HBO Therapy: No    Written patient dismissal instructions given to patient and signed by patient or POA. Thank you for choosing [facility] and allowing us to help heal your wounds. If you should have any questions after your visit, please call [phone]. Discharge Instructions         Discharge Instructions        [facility] Wound Care    Physician Orders and Discharge Instructions  [facility]  [address] [facility]  Telephone: [phone]        NAME:  John Ontiveros  YOB: 1945  MEDICAL RECORD NUMBER:  [id number]     Home Care:  None          Wound Location: NONE     Dressing orders: NONE     Compression:Apply 10-20MMhG compression hose to  BILATERAL lower leg(s), may remove at bedtime, reapply first thing in the morning, avoid prolonged standing, elevate legs when sitting.     Other Instructions: referral to [facility] to be done;      Keep all dressings clean & dry. Keep pressure off the wound(s) at all times.  Do not shower, take baths or get wound wet, unless otherwise instructed by your Wound Care doctor.     Follow up visit:  Dr Heather Gorman to do surgery after Mid June        For Diabetic patients keep blood sugars below 150 for optimal wound healing.     If you experience any of the following, please call the Wound Care Service during business hours: [phone]              *Increase in pain   *Temperature over 101   *Increase in drainage from your wound or a foul odor    *Uncontrolled swelling   *Need for compression bandage changes due to slippage, breakthrough drainage If you need medical attention outside of business hours, please contact your Primary Care Doctor or go to the nearest emergency room. Keep next scheduled appointment. Please give 24 hour notice if unable to keep appointment. [phone]     PLEASE NOTE: IF YOU ARE UNABLE TO OBTAIN WOUND SUPPLIES, CONTINUE TO USE THE SUPPLIES YOU HAVE AVAILABLE UNTIL YOU ARE ABLE TO REACH US.  IT IS MOST IMPORTANT TO KEEP THE WOUND COVERED AT ALL TIMES  Electronically signed by Malaika Goodman MD on 5/15/2019 at 10:13 AM              Electronically signed by Malaika Goodman MD on 5/15/2019 at 10:21 AM

## 2019-07-26 ENCOUNTER — HOSPITAL ENCOUNTER (OUTPATIENT)
Dept: WOUND CARE | Age: 74
Discharge: HOME | End: 2019-07-26
Payer: MEDICARE

## 2019-07-26 DIAGNOSIS — I87.2 VENOUS STASIS ULCER OF RIGHT CALF WITH FAT LAYER EXPOSED WITHOUT VARICOSE VEINS (HCC): Chronic | ICD-10-CM

## 2019-07-26 DIAGNOSIS — L97.212 VENOUS STASIS ULCER OF RIGHT CALF WITH FAT LAYER EXPOSED WITHOUT VARICOSE VEINS (HCC): Chronic | ICD-10-CM

## 2019-07-26 PROCEDURE — 87070 CULTURE OTHR SPECIMN AEROBIC: CPT

## 2019-07-26 PROCEDURE — 99213 OFFICE O/P EST LOW 20 MIN: CPT

## 2019-07-26 PROCEDURE — 87186 SC STD MICRODIL/AGAR DIL: CPT

## 2019-07-26 PROCEDURE — 87077 CULTURE AEROBIC IDENTIFY: CPT

## 2019-07-26 PROCEDURE — 87205 SMEAR GRAM STAIN: CPT

## 2019-07-26 RX ORDER — ACETAMINOPHEN 500 MG
500 TABLET ORAL EVERY 6 HOURS PRN
COMMUNITY
End: 2021-01-05 | Stop reason: DRUGHIGH

## 2019-07-26 NOTE — CODE DOCUMENTATION
[facility] Physician Billing Sheet. Abby Thrasher  AGE: 69 y.o.    GENDER: female  : 1945  TODAY'S DATE:  2019    ICD-10 2000 [facility] [address] Problems    Diagnosis Date Noted    Venous stasis ulcer of right calf with fat layer exposed without varicose veins ([facility] [address] [I87.2, L97.212] 2019    Lymphedema of both lower extremities [I89.0] 2019    Venous insufficiency (chronic) (peripheral) [I87.2] 2017    Edema leg [R60.0] 2010       PHYSICIAN PROCEDURES    CPT CODE  35616      Electronically signed by Pedro Solis DPM on 2019 at 1:23 PM

## 2019-07-26 NOTE — PROGRESS NOTES
Plan:     Patient seen and examined  Culture taken  Alginate Ag  Follow up in 2 weeks  Treatment Note please see attached Discharge Instructions    Written patient dismissal instructions given to patient and signed by patient or POA. Discharge Instructions       [facility] Wound Care    Physician Orders and Discharge Instructions  [address]  Telephone: [phone]      [phone]      NAME:  Marsha Jay  YOB: 1945  MEDICAL RECORD NUMBER:  [id number]    Home Care/Facility:  NONE    Wound Location:  RIGHT LOWER LEG    Dressing orders: 1. Cleanse wound(s) with normal saline. 2. Apply dry SILVERCEL OR CALCIUM ALGINATE WITH Ag or eqivalent to wound bed. 3. Cover with 4x4's and wrap with gauze (bryanna or kerlix)  4. Change  Every other day or Monday, Wednesday, and Friday    Compression:  Apply 10-20MMhG compression hose to BILATERAL lower leg(s), may remove at bedtime, reapply first thing in the morning, avoid prolonged standing, elevate legs when sitting. Offloading Device:  NONE    Other Instructions: WATCH SALT INTAKE, ELEVATE LEGS AS MUCH AS POSSIBLE. Keep all dressings clean, dry and intact. Keep pressure off the wound(s) at all times. Follow up visit   2 Weeks      Please give 24 hour notice if unable to keep appointment. [phone]    If you experience any of the following, please call the Wound Care Service at  [phone] or go to the nearest emergency room. *Increase in pain *Temperature over 101 *Increase in drainage from your wound or a foul odor  *Uncontrolled swelling *Need for compression bandage changes due to slippage, breakthrough drainage       PLEASE NOTE: IF YOU ARE UNABLE TO OBTAIN WOUND SUPPLIES, CONTINUE TO USE THE SUPPLIES YOU HAVE AVAILABLE UNTIL YOU ARE ABLE TO REACH US.  IT IS MOST IMPORTANT TO KEEP THE WOUND COVERED AT ALL TIMES                Electronically signed

## 2019-07-28 LAB
GRAM STAIN RESULT: ABNORMAL
ORGANISM: ABNORMAL
WOUND/ABSCESS: ABNORMAL
WOUND/ABSCESS: ABNORMAL

## 2019-07-30 RX ORDER — GENTAMICIN SULFATE 1 MG/G
OINTMENT TOPICAL
Qty: 30 G | Refills: 2 | Status: SHIPPED | OUTPATIENT
Start: 2019-07-30 | End: 2019-08-06

## 2019-08-13 ENCOUNTER — HOSPITAL ENCOUNTER (OUTPATIENT)
Dept: WOUND CARE | Age: 74
Discharge: HOME | End: 2019-08-13
Payer: MEDICARE

## 2019-08-13 VITALS
TEMPERATURE: 97.9 F | BODY MASS INDEX: 50.79 KG/M2 | WEIGHT: 269 LBS | RESPIRATION RATE: 16 BRPM | HEIGHT: 61 IN | HEART RATE: 104 BPM | SYSTOLIC BLOOD PRESSURE: 120 MMHG | DIASTOLIC BLOOD PRESSURE: 58 MMHG

## 2019-08-13 PROCEDURE — 99213 OFFICE O/P EST LOW 20 MIN: CPT

## 2019-08-13 RX ORDER — AMMONIUM LACTATE 12 G/100G
LOTION TOPICAL
Qty: 500 G | Refills: 3 | Status: ON HOLD | OUTPATIENT
Start: 2019-08-13 | End: 2020-12-07

## 2019-08-13 ASSESSMENT — PAIN SCALES - GENERAL: PAINLEVEL_OUTOF10: 0

## 2019-08-13 NOTE — PLAN OF CARE
Problem: Pain:  Goal: Control of chronic pain  Description  Control of chronic pain   Outcome: Ongoing

## 2019-08-13 NOTE — CODE DOCUMENTATION
[facility] Physician Billing Sheet. Abby Thrasher  AGE: 69 y.o.    GENDER: female  : 1945  TODAY'S DATE:  2019    ICD-10  [facility] Problems    Diagnosis Date Noted    Venous stasis ulcer of right calf with fat layer exposed without varicose veins ([facility] [address] [I87.2, L97.212] 2019    Venous insufficiency (chronic) (peripheral) [I87.2] 2017    [facility] [I83.019, L97.919] 2017       PHYSICIAN PROCEDURES    CPT CODE  62767      Electronically signed by Rayray Dumont DPM on 2019 at 9:49 AM

## 2019-08-27 ENCOUNTER — HOSPITAL ENCOUNTER (OUTPATIENT)
Dept: WOUND CARE | Age: 74
Discharge: HOME | End: 2019-08-27
Payer: MEDICARE

## 2019-08-27 VITALS
RESPIRATION RATE: 16 BRPM | HEART RATE: 107 BPM | DIASTOLIC BLOOD PRESSURE: 53 MMHG | TEMPERATURE: 98.2 F | SYSTOLIC BLOOD PRESSURE: 113 MMHG

## 2019-08-27 PROCEDURE — 99213 OFFICE O/P EST LOW 20 MIN: CPT

## 2019-08-27 NOTE — PROGRESS NOTES
108 (90 BASE) MCG/ACT inhaler 1 puff by Other route      ammonium lactate (LAC-HYDRIN) 12 % lotion apply to dry area on feet/legs twice a day      aspirin (ECOTRIN LOW STRENGTH) 81 MG EC tablet Take 81 mg by mouth      carvedilol (COREG) 25 MG tablet Take 25 mg by mouth      furosemide (LASIX) 20 MG tablet TAKE 2 TABLETS EVERY MORNING AND 1 TABLET EVERY EVENING      insulin glargine (LANTUS SOLOSTAR) 100 UNIT/ML injection pen INJECT 22 UNITS AT BEDTIME      insulin lispro (HUMALOG KWIKPEN) 100 UNIT/ML pen Inject 6 to 8 units before breakfast, 8 units with lunch and 12 units at dinner      losartan-hydrochlorothiazide (HYZAAR) 100-25 MG per tablet Take 1 tablet by mouth      Niacin ER 1000 MG TBCR Take 2,000 mg by mouth      SODIUM CHLORIDE, EXTERNAL, 0.9 % SOLN Apply 1 Applicatorful topically daily 1000 mL 2    amoxicillin (AMOXIL) 500 MG capsule Take 500 mg by mouth daily. No current facility-administered medications on file prior to encounter. REVIEW OF SYSTEMS    Pertinent items are noted in HPI. Objective:      BP (!) 113/53   Pulse 107   Temp 98.2 °F (36.8 °C) (Temporal)   Resp 16     Wt Readings from Last 3 Encounters:   08/13/19 269 lb (122 kg)   05/07/19 269 lb (122 kg)   10/17/17 283 lb (128.4 kg)       PHYSICAL EXAM    Constitutional:   Well nourished and well developed. Appears neat and clean. Patient is alert, oriented x3, and in no apparent distress. Respiratory:  Respiratory effort is easy and symmetric bilaterally. Rate is normal at rest and on room air. Vascular:  Pedal Pulses is not palpable and audible signal noted with doppler. Capillary refill is <5 sec to digits bilateral.  Extremities negative for pitting edema. Neurological:  Gross and Light touch intact. Protective sensation diminished    Dermatological:  Wound description noted in wound assessment. Psychiatric:  Judgement and insight intact. Short and long term memory intact.   No evidence of depression, anxiety, or agitation. Patient is calm, cooperative, and communicative. Appropriate interactions and affect. Assessment:      Problem List Items Addressed This Visit     None           Procedure Note  Indications:  Based on my examination of this patient's wound(s)/ulcer(s) today, debridement is not required to promote healing and evaluate the wound base. Wound 07/26/19 Leg Right;Lateral #1 (Active)   Wound Image   8/27/2019  4:10 PM   Wound Venous 8/27/2019  4:10 PM   Wound Cleansed Rinsed/Irrigated with saline 8/27/2019  4:10 PM   Wound Length (cm) 5.5 cm 8/27/2019  4:10 PM   Wound Width (cm) 4.7 cm 8/27/2019  4:10 PM   Wound Depth (cm) 0.2 cm 8/27/2019  4:10 PM   Wound Surface Area (cm^2) 25.85 cm^2 8/27/2019  4:10 PM   Change in Wound Size % (l*w) 18.84 8/27/2019  4:10 PM   Wound Volume (cm^3) 5.17 cm^3 8/27/2019  4:10 PM   Wound Healing % -63 8/27/2019  4:10 PM   Drainage Amount Copious 8/27/2019  4:10 PM   Drainage Description Serosanguinous 8/27/2019  4:10 PM   Odor None 8/27/2019  4:10 PM   Margins Defined edges 8/27/2019  4:10 PM   [facility]-wound Assessment Maceration 8/27/2019  4:10 PM   Non-staged Wound Description Full thickness 8/27/2019  4:10 PM   Red%Wound Bed 80 8/27/2019  4:10 PM   Yellow%Wound Bed 20 8/27/2019  4:10 PM   Number of days: 32         Plan:     Examined  Sorbion dressing to right lower leg  Encourage elevation as much as possible  Follow up in 3 weeks    Treatment Note please see attached Discharge Instructions    Written patient dismissal instructions given to patient and signed by patient or POA.          Discharge [address] [facility] Orders and Discharge [address], [address] [place] [address]  Telephone: [phone]      [phone]        NAME: Rocio Menjivar  DATE OF BIRTH:  1945  MEDICAL RECORD NUMBER:  [id number]     Home Care/Facility:  NONE     Wound Location:

## 2019-09-13 ENCOUNTER — HOSPITAL ENCOUNTER (OUTPATIENT)
Dept: WOUND CARE | Age: 74
Discharge: HOME | End: 2019-09-13
Payer: MEDICARE

## 2019-09-13 VITALS
HEIGHT: 61 IN | BODY MASS INDEX: 50.79 KG/M2 | WEIGHT: 269 LBS | SYSTOLIC BLOOD PRESSURE: 137 MMHG | TEMPERATURE: 98.6 F | RESPIRATION RATE: 18 BRPM | HEART RATE: 104 BPM | DIASTOLIC BLOOD PRESSURE: 66 MMHG

## 2019-09-13 PROCEDURE — 99213 OFFICE O/P EST LOW 20 MIN: CPT

## 2019-09-13 RX ORDER — LIDOCAINE 50 MG/G
1 PATCH TOPICAL DAILY
Qty: 30 PATCH | Refills: 0 | Status: SHIPPED | OUTPATIENT
Start: 2019-09-13 | End: 2019-10-13

## 2019-09-13 ASSESSMENT — PAIN DESCRIPTION - LOCATION: LOCATION: LEG

## 2019-09-13 ASSESSMENT — PAIN DESCRIPTION - DESCRIPTORS: DESCRIPTORS: THROBBING

## 2019-09-13 ASSESSMENT — PAIN DESCRIPTION - ORIENTATION: ORIENTATION: RIGHT

## 2019-09-13 ASSESSMENT — PAIN SCALES - GENERAL: PAINLEVEL_OUTOF10: 4

## 2019-09-13 ASSESSMENT — PAIN DESCRIPTION - FREQUENCY: FREQUENCY: INTERMITTENT

## 2019-09-13 NOTE — PROGRESS NOTES
[facility] [address] [address]   Progress Note and Procedure Note      Keagan Woodward  MEDICAL RECORD NUMBER:  [id number]  AGE: 69 y.o. GENDER: female  : 1945  EPISODE DATE:  2019    Subjective:     Chief Complaint   Patient presents with    Wound Check     right leg         HISTORY of PRESENT ILLNESS HPI     Keagan Woodward is a 69 y.o. female who presents today for wound/ulcer evaluation. History of Wound Context: Right lower leg venous ulcer. She is improving.  + pain. States the drainage has decreased since last visit. Denies nausea, vomiting, fevers, or chills. Wound/Ulcer Pain Timing/Severity: moderate  Quality of pain: sharp, burning  Severity:  5 / 10   Modifying Factors: Pain worsens with walking  Associated Signs/Symptoms: edema, drainage and pain    Ulcer Identification:  Ulcer Type: venous  Contributing Factors: edema, venous stasis, lymphedema and obesity    Wound: N/A        PAST MEDICAL HISTORY        Diagnosis Date    Hyperlipidemia     Hypertension     Osteoarthritis     Type II or unspecified type diabetes mellitus without mention of complication, not stated as uncontrolled        PAST SURGICAL HISTORY    Past Surgical History:   Procedure Laterality Date    BREAST SURGERY      HERNIA REPAIR      HYSTERECTOMY         FAMILY HISTORY    History reviewed. No pertinent family history.     SOCIAL HISTORY    Social History     Tobacco Use    Smoking status: Former Smoker    Smokeless tobacco: Former User   Substance Use Topics    Alcohol use: No    Drug use: No       ALLERGIES    Allergies   Allergen Reactions    Rocephin [Ceftriaxone Sodium]     Clindamycin Hives    Clindamycin/Lincomycin Rash    Minocycline Rash    Tetracyclines & Related Swelling     allergic to Minocin    Ceftriaxone Rash       MEDICATIONS    Current Outpatient Medications on File Prior to Encounter   Medication Sig Dispense Refill    ammonium lactate (LAC-HYDRIN) 12 % lotion Apply topically fat layer exposed without varicose veins [facility] [I87.2, L97.212] 07/26/2019    Lymphedema of both lower extremities [I89.0] 05/07/2019    Venous ulcer of right leg ([facility] [address] [I83.019, L97.919] 01/17/2017    Venous insufficiency (chronic) (peripheral) [I87.2] 01/17/2017        Wound/Ulcer Descriptions/Measurements:    Wound 07/26/19 Leg Right;Lateral #1 (Active)   Wound Image   9/13/2019 11:04 AM   Wound Venous 9/13/2019 11:04 AM   Dressing/Treatment Antibacterial Ointment;Barrier Film 8/27/2019  4:58 PM   Wound Cleansed Rinsed/Irrigated with saline 9/13/2019 11:04 AM   Wound Length (cm) 6.2 cm 9/13/2019 11:04 AM   Wound Width (cm) 5 cm 9/13/2019 11:04 AM   Wound Depth (cm) 0.3 cm 9/13/2019 11:04 AM   Wound Surface Area (cm^2) 31 cm^2 9/13/2019 11:04 AM   Change in Wound Size % (l*w) 2.67 9/13/2019 11:04 AM   Wound Volume (cm^3) 9.3 cm^3 9/13/2019 11:04 AM   Wound Healing % -192 9/13/2019 11:04 AM   Drainage Amount Large 9/13/2019 11:04 AM   Drainage Description Serosanguinous 9/13/2019 11:04 AM   Odor None 9/13/2019 11:04 AM   Margins Defined edges 9/13/2019 11:04 AM   [facility]-wound Assessment Maceration 9/13/2019 11:04 AM   Non-staged Wound Description Full thickness 9/13/2019 11:04 AM   Red%Wound Bed 98 9/13/2019 11:04 AM   Yellow%Wound Bed 2 9/13/2019 11:04 AM   Number of days: 49              Plan:     She is to elevate her legs, reduce sodium intake and wear compression for edema control. Plan of care was discussed with patient and she is in agreement. Treatment Note please see attached Discharge Instructions    In my professional opinion this patient would benefit from HBO Therapy: No    Written patient dismissal instructions given to patient and signed by patient or POA.          Discharge Instructions       [address] [place] [address] Orders and Discharge Instructions  [facility]  [facility], [address] [place] [address]  Telephone: [phone]      FAX

## 2019-09-24 ENCOUNTER — HOSPITAL ENCOUNTER (OUTPATIENT)
Dept: WOUND CARE | Age: 74
Discharge: HOME | End: 2019-09-24
Payer: MEDICARE

## 2019-09-24 DIAGNOSIS — I83.019 VENOUS ULCER OF RIGHT LEG (HCC): Chronic | ICD-10-CM

## 2019-09-24 DIAGNOSIS — R60.0 EDEMA LEG: ICD-10-CM

## 2019-09-24 DIAGNOSIS — I89.0 LYMPHEDEMA OF BOTH LOWER EXTREMITIES: Chronic | ICD-10-CM

## 2019-09-24 DIAGNOSIS — L97.919 VENOUS ULCER OF RIGHT LEG (HCC): Chronic | ICD-10-CM

## 2019-09-24 DIAGNOSIS — L97.212 VENOUS STASIS ULCER OF RIGHT CALF WITH FAT LAYER EXPOSED WITHOUT VARICOSE VEINS (HCC): Chronic | ICD-10-CM

## 2019-09-24 DIAGNOSIS — I87.2 VENOUS INSUFFICIENCY (CHRONIC) (PERIPHERAL): Primary | ICD-10-CM

## 2019-09-24 DIAGNOSIS — I87.2 VENOUS STASIS ULCER OF RIGHT CALF WITH FAT LAYER EXPOSED WITHOUT VARICOSE VEINS (HCC): Chronic | ICD-10-CM

## 2019-09-24 PROCEDURE — 87077 CULTURE AEROBIC IDENTIFY: CPT

## 2019-09-24 PROCEDURE — 87205 SMEAR GRAM STAIN: CPT

## 2019-09-24 PROCEDURE — 87186 SC STD MICRODIL/AGAR DIL: CPT

## 2019-09-24 PROCEDURE — 87147 CULTURE TYPE IMMUNOLOGIC: CPT

## 2019-09-24 PROCEDURE — 99213 OFFICE O/P EST LOW 20 MIN: CPT

## 2019-09-24 PROCEDURE — 87070 CULTURE OTHR SPECIMN AEROBIC: CPT

## 2019-09-24 NOTE — CODE DOCUMENTATION
[facility] Phys[facility] Billing Sheet. Abby Thrasher  AGE: 69 y.o.    GENDER: female  : 1945  TODAY'S DATE:  2019    ICD-10 2000 [facility] [address] Problems    Diagnosis Date Noted    Venous stasis ulcer of right calf with fat layer exposed without varicose veins ([facility] [address] [I87.2, L97.212] 2019    Lymphedema of both lower extremities [I89.0] 2019    Obesity due to excess calories [E66.09] 2017    Venous insufficiency (chronic) (peripheral) [I87.2] 2017    Venous ulcer of right leg ([facility] [I83.019, L97.919] 2017    Edema leg [R60.0] 2010       PHYSICIAN PROCEDURES    CPT CODE  46848      Electronically signed by Denae Pond DPM on 2019 at 3:34 PM

## 2019-09-27 LAB
GRAM STAIN RESULT: ABNORMAL
ORGANISM: ABNORMAL
WOUND/ABSCESS: ABNORMAL

## 2019-09-30 ENCOUNTER — TELEPHONE (OUTPATIENT)
Dept: INFECTIOUS DISEASES | Age: 74
End: 2019-09-30

## 2019-10-08 ENCOUNTER — HOSPITAL ENCOUNTER (OUTPATIENT)
Dept: WOUND CARE | Age: 74
Discharge: HOME | End: 2019-10-08
Payer: MEDICARE

## 2019-10-08 VITALS
SYSTOLIC BLOOD PRESSURE: 137 MMHG | HEART RATE: 75 BPM | RESPIRATION RATE: 18 BRPM | DIASTOLIC BLOOD PRESSURE: 63 MMHG | TEMPERATURE: 98.1 F

## 2019-10-08 PROCEDURE — 99213 OFFICE O/P EST LOW 20 MIN: CPT

## 2019-10-14 ENCOUNTER — OFFICE VISIT (OUTPATIENT)
Dept: INFECTIOUS DISEASES | Age: 74
End: 2019-10-14
Payer: MEDICARE

## 2019-10-14 ENCOUNTER — TELEPHONE (OUTPATIENT)
Dept: INFECTIOUS DISEASES | Age: 74
End: 2019-10-14

## 2019-10-14 VITALS
HEIGHT: 61 IN | DIASTOLIC BLOOD PRESSURE: 78 MMHG | WEIGHT: 269 LBS | BODY MASS INDEX: 50.79 KG/M2 | SYSTOLIC BLOOD PRESSURE: 110 MMHG | HEART RATE: 76 BPM | TEMPERATURE: 97.9 F | RESPIRATION RATE: 24 BRPM

## 2019-10-14 DIAGNOSIS — L03.115 CELLULITIS OF RIGHT LEG: Primary | ICD-10-CM

## 2019-10-14 DIAGNOSIS — I87.2 VENOUS STASIS ULCER OF RIGHT CALF LIMITED TO BREAKDOWN OF SKIN WITHOUT VARICOSE VEINS (HCC): ICD-10-CM

## 2019-10-14 DIAGNOSIS — A49.02 MRSA (METHICILLIN RESISTANT STAPHYLOCOCCUS AUREUS) INFECTION: ICD-10-CM

## 2019-10-14 DIAGNOSIS — L97.211 VENOUS STASIS ULCER OF RIGHT CALF LIMITED TO BREAKDOWN OF SKIN WITHOUT VARICOSE VEINS (HCC): ICD-10-CM

## 2019-10-14 PROCEDURE — 99203 OFFICE O/P NEW LOW 30 MIN: CPT | Performed by: INTERNAL MEDICINE

## 2019-10-14 RX ORDER — DOXYCYCLINE HYCLATE 100 MG
100 TABLET ORAL 2 TIMES DAILY
Qty: 60 TABLET | Refills: 0 | Status: SHIPPED | OUTPATIENT
Start: 2019-10-14 | End: 2019-11-13

## 2019-10-15 ENCOUNTER — HOSPITAL ENCOUNTER (OUTPATIENT)
Dept: LAB | Age: 74
Discharge: HOME | End: 2019-10-15
Payer: MEDICARE

## 2019-10-15 DIAGNOSIS — L03.115 CELLULITIS OF RIGHT LEG: ICD-10-CM

## 2019-10-15 DIAGNOSIS — I87.2 VENOUS STASIS ULCER OF RIGHT CALF LIMITED TO BREAKDOWN OF SKIN WITHOUT VARICOSE VEINS (HCC): ICD-10-CM

## 2019-10-15 DIAGNOSIS — L97.211 VENOUS STASIS ULCER OF RIGHT CALF LIMITED TO BREAKDOWN OF SKIN WITHOUT VARICOSE VEINS (HCC): ICD-10-CM

## 2019-10-15 DIAGNOSIS — A49.02 MRSA (METHICILLIN RESISTANT STAPHYLOCOCCUS AUREUS) INFECTION: ICD-10-CM

## 2019-10-15 LAB
ANION GAP SERPL CALCULATED.3IONS-SCNC: 14 MEQ/L (ref 9–15)
BUN BLDV-MCNC: 25 MG/DL (ref 8–23)
CALCIUM SERPL-MCNC: 9.1 MG/DL (ref 8.5–9.9)
CHLORIDE BLD-SCNC: 96 MEQ/L (ref 95–107)
CO2: 27 MEQ/L (ref 20–31)
CREAT SERPL-MCNC: 0.84 MG/DL (ref 0.5–0.9)
GFR AFRICAN AMERICAN: >60
GFR NON-AFRICAN AMERICAN: >60
GLUCOSE BLD-MCNC: 317 MG/DL (ref 70–99)
HCT VFR BLD CALC: 41.6 % (ref 37–47)
HEMOGLOBIN: 13.7 G/DL (ref 12–16)
MCH RBC QN AUTO: 32.1 PG (ref 27–31.3)
MCHC RBC AUTO-ENTMCNC: 32.9 % (ref 33–37)
MCV RBC AUTO: 97.5 FL (ref 82–100)
PDW BLD-RTO: 15.6 % (ref 11.5–14.5)
PLATELET # BLD: 210 K/UL (ref 130–400)
POTASSIUM SERPL-SCNC: 4.1 MEQ/L (ref 3.4–4.9)
RBC # BLD: 4.26 M/UL (ref 4.2–5.4)
SODIUM BLD-SCNC: 137 MEQ/L (ref 135–144)
WBC # BLD: 6.4 K/UL (ref 4.8–10.8)

## 2019-10-15 PROCEDURE — 80048 BASIC METABOLIC PNL TOTAL CA: CPT

## 2019-10-15 PROCEDURE — 85027 COMPLETE CBC AUTOMATED: CPT

## 2019-10-15 PROCEDURE — 36415 COLL VENOUS BLD VENIPUNCTURE: CPT

## 2019-10-18 ENCOUNTER — TELEPHONE (OUTPATIENT)
Dept: INFECTIOUS DISEASES | Age: 74
End: 2019-10-18

## 2019-10-18 ENCOUNTER — TELEPHONE (OUTPATIENT)
Dept: WOUND CARE | Age: 74
End: 2019-10-18

## 2019-10-22 ENCOUNTER — HOSPITAL ENCOUNTER (OUTPATIENT)
Dept: WOUND CARE | Age: 74
Discharge: HOME | End: 2019-10-22
Payer: MEDICARE

## 2019-10-22 VITALS
HEART RATE: 87 BPM | DIASTOLIC BLOOD PRESSURE: 66 MMHG | SYSTOLIC BLOOD PRESSURE: 122 MMHG | RESPIRATION RATE: 20 BRPM | TEMPERATURE: 97.8 F

## 2019-10-22 DIAGNOSIS — I87.2 VENOUS STASIS ULCER OF RIGHT CALF WITH FAT LAYER EXPOSED WITHOUT VARICOSE VEINS (HCC): Chronic | ICD-10-CM

## 2019-10-22 DIAGNOSIS — I83.019 VENOUS ULCER OF RIGHT LEG (HCC): Chronic | ICD-10-CM

## 2019-10-22 DIAGNOSIS — R60.0 EDEMA LEG: ICD-10-CM

## 2019-10-22 DIAGNOSIS — I87.2 VENOUS INSUFFICIENCY (CHRONIC) (PERIPHERAL): Primary | ICD-10-CM

## 2019-10-22 DIAGNOSIS — L97.919 VENOUS ULCER OF RIGHT LEG (HCC): Chronic | ICD-10-CM

## 2019-10-22 DIAGNOSIS — L97.212 VENOUS STASIS ULCER OF RIGHT CALF WITH FAT LAYER EXPOSED WITHOUT VARICOSE VEINS (HCC): Chronic | ICD-10-CM

## 2019-10-22 DIAGNOSIS — I89.0 LYMPHEDEMA OF BOTH LOWER EXTREMITIES: Chronic | ICD-10-CM

## 2019-10-22 PROCEDURE — 99213 OFFICE O/P EST LOW 20 MIN: CPT

## 2019-10-22 ASSESSMENT — PAIN DESCRIPTION - FREQUENCY: FREQUENCY: INTERMITTENT

## 2019-10-22 ASSESSMENT — PAIN DESCRIPTION - ORIENTATION: ORIENTATION: RIGHT

## 2019-10-22 ASSESSMENT — PAIN DESCRIPTION - LOCATION: LOCATION: LEG

## 2019-10-22 ASSESSMENT — PAIN DESCRIPTION - PAIN TYPE: TYPE: ACUTE PAIN

## 2019-10-22 ASSESSMENT — PAIN SCALES - GENERAL: PAINLEVEL_OUTOF10: 2

## 2019-10-31 ENCOUNTER — TELEPHONE (OUTPATIENT)
Dept: WOUND CARE | Age: 74
End: 2019-10-31

## 2019-11-12 ENCOUNTER — HOSPITAL ENCOUNTER (OUTPATIENT)
Dept: WOUND CARE | Age: 74
Discharge: HOME | End: 2019-11-12
Payer: MEDICARE

## 2019-11-12 ENCOUNTER — HOSPITAL ENCOUNTER (OUTPATIENT)
Dept: ULTRASOUND IMAGING | Age: 74
Discharge: HOME | End: 2019-11-14
Payer: MEDICARE

## 2019-11-12 VITALS
RESPIRATION RATE: 20 BRPM | TEMPERATURE: 98 F | HEART RATE: 87 BPM | SYSTOLIC BLOOD PRESSURE: 124 MMHG | DIASTOLIC BLOOD PRESSURE: 57 MMHG

## 2019-11-12 DIAGNOSIS — L97.212 VENOUS STASIS ULCER OF RIGHT CALF WITH FAT LAYER EXPOSED WITHOUT VARICOSE VEINS (HCC): Chronic | ICD-10-CM

## 2019-11-12 DIAGNOSIS — I89.0 LYMPHEDEMA OF BOTH LOWER EXTREMITIES: Chronic | ICD-10-CM

## 2019-11-12 DIAGNOSIS — I83.019 VENOUS ULCER OF RIGHT LEG (HCC): Chronic | ICD-10-CM

## 2019-11-12 DIAGNOSIS — I87.2 VENOUS INSUFFICIENCY (CHRONIC) (PERIPHERAL): ICD-10-CM

## 2019-11-12 DIAGNOSIS — L97.919 VENOUS ULCER OF RIGHT LEG (HCC): Chronic | ICD-10-CM

## 2019-11-12 DIAGNOSIS — I87.2 VENOUS STASIS ULCER OF RIGHT CALF WITH FAT LAYER EXPOSED WITHOUT VARICOSE VEINS (HCC): Chronic | ICD-10-CM

## 2019-11-12 DIAGNOSIS — R60.0 EDEMA LEG: Primary | ICD-10-CM

## 2019-11-12 DIAGNOSIS — R60.0 EDEMA LEG: ICD-10-CM

## 2019-11-12 PROCEDURE — 93926 LOWER EXTREMITY STUDY: CPT

## 2019-11-12 PROCEDURE — 93970 EXTREMITY STUDY: CPT

## 2019-11-12 PROCEDURE — 99213 OFFICE O/P EST LOW 20 MIN: CPT

## 2019-11-12 RX ORDER — POTASSIUM CHLORIDE 750 MG/1
CAPSULE, EXTENDED RELEASE ORAL
Status: ON HOLD | COMMUNITY
Start: 2019-06-24 | End: 2021-03-22 | Stop reason: HOSPADM

## 2019-11-12 RX ORDER — GENTAMICIN SULFATE 1 MG/G
OINTMENT TOPICAL
Qty: 60 G | Refills: 2 | Status: SHIPPED | OUTPATIENT
Start: 2019-11-12 | End: 2019-11-19

## 2019-11-26 ENCOUNTER — HOSPITAL ENCOUNTER (OUTPATIENT)
Dept: WOUND CARE | Age: 74
Discharge: HOME | End: 2019-11-26
Payer: MEDICARE

## 2019-11-26 ENCOUNTER — HOSPITAL ENCOUNTER (OUTPATIENT)
Dept: GENERAL RADIOLOGY | Age: 74
Discharge: HOME | End: 2019-11-28
Payer: MEDICARE

## 2019-11-26 ENCOUNTER — OFFICE VISIT (OUTPATIENT)
Dept: FAMILY MEDICINE CLINIC | Age: 74
End: 2019-11-26
Payer: MEDICARE

## 2019-11-26 VITALS
DIASTOLIC BLOOD PRESSURE: 59 MMHG | SYSTOLIC BLOOD PRESSURE: 128 MMHG | HEART RATE: 75 BPM | RESPIRATION RATE: 20 BRPM | TEMPERATURE: 99.8 F

## 2019-11-26 VITALS
TEMPERATURE: 97.3 F | HEART RATE: 77 BPM | WEIGHT: 269.6 LBS | SYSTOLIC BLOOD PRESSURE: 124 MMHG | RESPIRATION RATE: 18 BRPM | HEIGHT: 61 IN | OXYGEN SATURATION: 94 % | DIASTOLIC BLOOD PRESSURE: 78 MMHG | BODY MASS INDEX: 50.9 KG/M2

## 2019-11-26 DIAGNOSIS — R06.2 WHEEZE: ICD-10-CM

## 2019-11-26 DIAGNOSIS — J22 LOWER RESPIRATORY INFECTION (E.G., BRONCHITIS, PNEUMONIA, PNEUMONITIS, PULMONITIS): Primary | ICD-10-CM

## 2019-11-26 DIAGNOSIS — J22 LOWER RESPIRATORY INFECTION (E.G., BRONCHITIS, PNEUMONIA, PNEUMONITIS, PULMONITIS): ICD-10-CM

## 2019-11-26 DIAGNOSIS — I73.9 PAD (PERIPHERAL ARTERY DISEASE) (HCC): Chronic | ICD-10-CM

## 2019-11-26 PROCEDURE — 99214 OFFICE O/P EST MOD 30 MIN: CPT | Performed by: NURSE PRACTITIONER

## 2019-11-26 PROCEDURE — 71046 X-RAY EXAM CHEST 2 VIEWS: CPT

## 2019-11-26 PROCEDURE — 99213 OFFICE O/P EST LOW 20 MIN: CPT

## 2019-11-26 PROCEDURE — 94640 AIRWAY INHALATION TREATMENT: CPT | Performed by: NURSE PRACTITIONER

## 2019-11-26 RX ORDER — IPRATROPIUM BROMIDE AND ALBUTEROL SULFATE 2.5; .5 MG/3ML; MG/3ML
1 SOLUTION RESPIRATORY (INHALATION) ONCE
Status: COMPLETED | OUTPATIENT
Start: 2019-11-26 | End: 2019-11-26

## 2019-11-26 RX ORDER — PREDNISONE 20 MG/1
40 TABLET ORAL DAILY
Qty: 8 TABLET | Refills: 0 | Status: SHIPPED | OUTPATIENT
Start: 2019-11-26 | End: 2019-11-30

## 2019-11-26 RX ORDER — LEVOFLOXACIN 750 MG/1
750 TABLET ORAL DAILY
Qty: 5 TABLET | Refills: 0 | Status: SHIPPED | OUTPATIENT
Start: 2019-11-26 | End: 2019-11-26

## 2019-11-26 RX ORDER — LEVOFLOXACIN 750 MG/1
750 TABLET ORAL DAILY
Qty: 7 TABLET | Refills: 0 | Status: SHIPPED | OUTPATIENT
Start: 2019-11-26 | End: 2019-12-03

## 2019-11-26 RX ADMIN — IPRATROPIUM BROMIDE AND ALBUTEROL SULFATE 1 AMPULE: 2.5; .5 SOLUTION RESPIRATORY (INHALATION) at 16:21

## 2019-11-26 ASSESSMENT — ENCOUNTER SYMPTOMS
RHINORRHEA: 0
TROUBLE SWALLOWING: 0
SINUS PRESSURE: 1
EYE DISCHARGE: 0
SINUS PAIN: 0
COUGH: 1
SHORTNESS OF BREATH: 0
EYE PAIN: 0
SORE THROAT: 0
COLOR CHANGE: 0
WHEEZING: 1

## 2019-12-03 ENCOUNTER — TELEPHONE (OUTPATIENT)
Dept: FAMILY MEDICINE CLINIC | Age: 74
End: 2019-12-03

## 2019-12-17 ENCOUNTER — HOSPITAL ENCOUNTER (OUTPATIENT)
Dept: WOUND CARE | Age: 74
Discharge: HOME | End: 2019-12-17
Payer: MEDICARE

## 2019-12-17 VITALS
SYSTOLIC BLOOD PRESSURE: 123 MMHG | RESPIRATION RATE: 18 BRPM | HEART RATE: 76 BPM | TEMPERATURE: 97.6 F | DIASTOLIC BLOOD PRESSURE: 62 MMHG

## 2019-12-17 PROCEDURE — 99213 OFFICE O/P EST LOW 20 MIN: CPT

## 2019-12-27 ENCOUNTER — HOSPITAL ENCOUNTER (OUTPATIENT)
Dept: WOUND CARE | Age: 74
Discharge: HOME | End: 2019-12-27
Payer: MEDICARE

## 2019-12-27 VITALS
WEIGHT: 269 LBS | BODY MASS INDEX: 50.79 KG/M2 | HEART RATE: 86 BPM | TEMPERATURE: 98.5 F | DIASTOLIC BLOOD PRESSURE: 60 MMHG | HEIGHT: 61 IN | RESPIRATION RATE: 18 BRPM | SYSTOLIC BLOOD PRESSURE: 133 MMHG

## 2019-12-27 DIAGNOSIS — I73.9 PAD (PERIPHERAL ARTERY DISEASE) (HCC): Primary | Chronic | ICD-10-CM

## 2019-12-27 DIAGNOSIS — I73.9 PAD (PERIPHERAL ARTERY DISEASE) (HCC): Chronic | ICD-10-CM

## 2019-12-27 LAB
ANION GAP SERPL CALCULATED.3IONS-SCNC: 14 MEQ/L (ref 9–15)
BUN BLDV-MCNC: 23 MG/DL (ref 8–23)
CALCIUM SERPL-MCNC: 9.2 MG/DL (ref 8.5–9.9)
CHLORIDE BLD-SCNC: 96 MEQ/L (ref 95–107)
CO2: 28 MEQ/L (ref 20–31)
CREAT SERPL-MCNC: 0.87 MG/DL (ref 0.5–0.9)
GFR AFRICAN AMERICAN: >60
GFR NON-AFRICAN AMERICAN: >60
GLUCOSE BLD-MCNC: 214 MG/DL (ref 70–99)
HCT VFR BLD CALC: 40.8 % (ref 37–47)
HEMOGLOBIN: 13.5 G/DL (ref 12–16)
MCH RBC QN AUTO: 32.6 PG (ref 27–31.3)
MCHC RBC AUTO-ENTMCNC: 33.1 % (ref 33–37)
MCV RBC AUTO: 98.4 FL (ref 82–100)
PDW BLD-RTO: 15.7 % (ref 11.5–14.5)
PLATELET # BLD: 171 K/UL (ref 130–400)
POTASSIUM SERPL-SCNC: 3.8 MEQ/L (ref 3.4–4.9)
RBC # BLD: 4.15 M/UL (ref 4.2–5.4)
SODIUM BLD-SCNC: 138 MEQ/L (ref 135–144)
WBC # BLD: 6.7 K/UL (ref 4.8–10.8)

## 2019-12-27 PROCEDURE — 99212 OFFICE O/P EST SF 10 MIN: CPT

## 2019-12-27 PROCEDURE — 99205 OFFICE O/P NEW HI 60 MIN: CPT | Performed by: SURGERY

## 2019-12-27 ASSESSMENT — PAIN SCALES - GENERAL: PAINLEVEL_OUTOF10: 0

## 2019-12-29 ENCOUNTER — HOSPITAL ENCOUNTER (EMERGENCY)
Age: 74
Discharge: HOME | End: 2019-12-29
Attending: EMERGENCY MEDICINE
Payer: MEDICARE

## 2019-12-29 ENCOUNTER — APPOINTMENT (OUTPATIENT)
Dept: GENERAL RADIOLOGY | Age: 74
End: 2019-12-29
Payer: MEDICARE

## 2019-12-29 VITALS
DIASTOLIC BLOOD PRESSURE: 70 MMHG | HEIGHT: 61 IN | TEMPERATURE: 98.7 F | SYSTOLIC BLOOD PRESSURE: 161 MMHG | HEART RATE: 86 BPM | OXYGEN SATURATION: 94 % | WEIGHT: 269 LBS | BODY MASS INDEX: 50.79 KG/M2 | RESPIRATION RATE: 18 BRPM

## 2019-12-29 LAB
ALBUMIN SERPL-MCNC: 3.7 G/DL (ref 3.5–4.6)
ALP BLD-CCNC: 69 U/L (ref 40–130)
ALT SERPL-CCNC: 13 U/L (ref 0–33)
ANION GAP SERPL CALCULATED.3IONS-SCNC: 13 MEQ/L (ref 9–15)
AST SERPL-CCNC: 12 U/L (ref 0–35)
BASOPHILS ABSOLUTE: 0 K/UL (ref 0–0.2)
BASOPHILS RELATIVE PERCENT: 0.1 %
BILIRUB SERPL-MCNC: 0.6 MG/DL (ref 0.2–0.7)
BILIRUBIN URINE: NEGATIVE
BLOOD, URINE: NEGATIVE
BUN BLDV-MCNC: 21 MG/DL (ref 8–23)
CALCIUM SERPL-MCNC: 9.5 MG/DL (ref 8.5–9.9)
CHLORIDE BLD-SCNC: 95 MEQ/L (ref 95–107)
CLARITY: CLEAR
CO2: 28 MEQ/L (ref 20–31)
COLOR: YELLOW
CREAT SERPL-MCNC: 0.66 MG/DL (ref 0.5–0.9)
EKG ATRIAL RATE: 85 BPM
EKG P AXIS: 61 DEGREES
EKG P-R INTERVAL: 148 MS
EKG Q-T INTERVAL: 396 MS
EKG QRS DURATION: 112 MS
EKG QTC CALCULATION (BAZETT): 471 MS
EKG R AXIS: -30 DEGREES
EKG T AXIS: 95 DEGREES
EKG VENTRICULAR RATE: 85 BPM
EOSINOPHILS ABSOLUTE: 0.2 K/UL (ref 0–0.7)
EOSINOPHILS RELATIVE PERCENT: 3.1 %
GFR AFRICAN AMERICAN: >60
GFR NON-AFRICAN AMERICAN: >60
GLOBULIN: 2.9 G/DL (ref 2.3–3.5)
GLUCOSE BLD-MCNC: 232 MG/DL (ref 70–99)
GLUCOSE URINE: NEGATIVE MG/DL
HCT VFR BLD CALC: 41.4 % (ref 37–47)
HEMOGLOBIN: 13.6 G/DL (ref 12–16)
INFLUENZA A BY PCR: POSITIVE
INFLUENZA B BY PCR: NEGATIVE
KETONES, URINE: NEGATIVE MG/DL
LEUKOCYTE ESTERASE, URINE: NEGATIVE
LYMPHOCYTES ABSOLUTE: 0.9 K/UL (ref 1–4.8)
LYMPHOCYTES RELATIVE PERCENT: 16 %
MAGNESIUM: 1.6 MG/DL (ref 1.7–2.4)
MCH RBC QN AUTO: 32 PG (ref 27–31.3)
MCHC RBC AUTO-ENTMCNC: 32.8 % (ref 33–37)
MCV RBC AUTO: 97.6 FL (ref 82–100)
MONOCYTES ABSOLUTE: 0.7 K/UL (ref 0.2–0.8)
MONOCYTES RELATIVE PERCENT: 13.2 %
NEUTROPHILS ABSOLUTE: 3.7 K/UL (ref 1.4–6.5)
NEUTROPHILS RELATIVE PERCENT: 67.6 %
NITRITE, URINE: NEGATIVE
PDW BLD-RTO: 15.3 % (ref 11.5–14.5)
PH UA: 7.5 (ref 5–9)
PLATELET # BLD: 152 K/UL (ref 130–400)
POTASSIUM SERPL-SCNC: 3.8 MEQ/L (ref 3.4–4.9)
PRO-BNP: 3205 PG/ML
PROTEIN UA: NORMAL MG/DL
RBC # BLD: 4.24 M/UL (ref 4.2–5.4)
SODIUM BLD-SCNC: 136 MEQ/L (ref 135–144)
SPECIFIC GRAVITY UA: 1.01 (ref 1–1.03)
TOTAL PROTEIN: 6.6 G/DL (ref 6.3–8)
TROPONIN: <0.01 NG/ML (ref 0–0.01)
URINE REFLEX TO CULTURE: NORMAL
UROBILINOGEN, URINE: 1 E.U./DL
WBC # BLD: 5.5 K/UL (ref 4.8–10.8)

## 2019-12-29 PROCEDURE — 84484 ASSAY OF TROPONIN QUANT: CPT

## 2019-12-29 PROCEDURE — 93005 ELECTROCARDIOGRAM TRACING: CPT

## 2019-12-29 PROCEDURE — 85025 COMPLETE CBC W/AUTO DIFF WBC: CPT

## 2019-12-29 PROCEDURE — 99283 EMERGENCY DEPT VISIT LOW MDM: CPT

## 2019-12-29 PROCEDURE — 87502 INFLUENZA DNA AMP PROBE: CPT

## 2019-12-29 PROCEDURE — 81003 URINALYSIS AUTO W/O SCOPE: CPT

## 2019-12-29 PROCEDURE — 71045 X-RAY EXAM CHEST 1 VIEW: CPT

## 2019-12-29 PROCEDURE — 94640 AIRWAY INHALATION TREATMENT: CPT

## 2019-12-29 PROCEDURE — 83880 ASSAY OF NATRIURETIC PEPTIDE: CPT

## 2019-12-29 PROCEDURE — 6360000002 HC RX W HCPCS: Performed by: EMERGENCY MEDICINE

## 2019-12-29 PROCEDURE — 6370000000 HC RX 637 (ALT 250 FOR IP): Performed by: EMERGENCY MEDICINE

## 2019-12-29 PROCEDURE — 96374 THER/PROPH/DIAG INJ IV PUSH: CPT

## 2019-12-29 PROCEDURE — 83735 ASSAY OF MAGNESIUM: CPT

## 2019-12-29 PROCEDURE — 80053 COMPREHEN METABOLIC PANEL: CPT

## 2019-12-29 RX ORDER — SODIUM CHLORIDE 0.9 % (FLUSH) 0.9 %
3 SYRINGE (ML) INJECTION EVERY 8 HOURS
Status: DISCONTINUED | OUTPATIENT
Start: 2019-12-29 | End: 2019-12-29 | Stop reason: HOSPADM

## 2019-12-29 RX ORDER — GUAIFENESIN 600 MG/1
1200 TABLET, EXTENDED RELEASE ORAL 2 TIMES DAILY
Qty: 40 TABLET | Refills: 0 | Status: SHIPPED | OUTPATIENT
Start: 2019-12-29 | End: 2020-01-08

## 2019-12-29 RX ORDER — IPRATROPIUM BROMIDE AND ALBUTEROL SULFATE 2.5; .5 MG/3ML; MG/3ML
1 SOLUTION RESPIRATORY (INHALATION) ONCE
Status: COMPLETED | OUTPATIENT
Start: 2019-12-29 | End: 2019-12-29

## 2019-12-29 RX ORDER — OSELTAMIVIR PHOSPHATE 75 MG/1
75 CAPSULE ORAL ONCE
Status: COMPLETED | OUTPATIENT
Start: 2019-12-29 | End: 2019-12-29

## 2019-12-29 RX ORDER — PREDNISONE 10 MG/1
60 TABLET ORAL DAILY
Qty: 30 TABLET | Refills: 0 | Status: SHIPPED | OUTPATIENT
Start: 2019-12-29 | End: 2020-01-03

## 2019-12-29 RX ORDER — OSELTAMIVIR PHOSPHATE 75 MG/1
75 CAPSULE ORAL 2 TIMES DAILY
Qty: 10 CAPSULE | Refills: 0 | Status: SHIPPED | OUTPATIENT
Start: 2019-12-29 | End: 2020-01-03

## 2019-12-29 RX ORDER — METHYLPREDNISOLONE SODIUM SUCCINATE 125 MG/2ML
125 INJECTION, POWDER, LYOPHILIZED, FOR SOLUTION INTRAMUSCULAR; INTRAVENOUS ONCE
Status: COMPLETED | OUTPATIENT
Start: 2019-12-29 | End: 2019-12-29

## 2019-12-29 RX ADMIN — METHYLPREDNISOLONE SODIUM SUCCINATE 125 MG: 125 INJECTION, POWDER, FOR SOLUTION INTRAMUSCULAR; INTRAVENOUS at 11:31

## 2019-12-29 RX ADMIN — OSELTAMIVIR PHOSPHATE 75 MG: 75 CAPSULE ORAL at 12:44

## 2019-12-29 RX ADMIN — IPRATROPIUM BROMIDE AND ALBUTEROL SULFATE 1 AMPULE: .5; 3 SOLUTION RESPIRATORY (INHALATION) at 11:34

## 2019-12-29 ASSESSMENT — PAIN DESCRIPTION - DESCRIPTORS: DESCRIPTORS: PRESSURE

## 2019-12-29 ASSESSMENT — PAIN SCALES - GENERAL: PAINLEVEL_OUTOF10: 5

## 2019-12-29 ASSESSMENT — PAIN DESCRIPTION - ORIENTATION: ORIENTATION: RIGHT;LEFT

## 2019-12-29 ASSESSMENT — PAIN DESCRIPTION - FREQUENCY: FREQUENCY: CONTINUOUS

## 2019-12-29 NOTE — ED PROVIDER NOTES
capsule TAKE 1 TABLET TWICE A DAYHistorical Med      !! ammonium lactate (LAC-HYDRIN) 12 % lotion Apply topically daily. , Disp-500 g, R-3, Normal      acetaminophen (TYLENOL) 500 MG tablet Take 500 mg by mouth every 6 hours as needed for PainHistorical Med      albuterol sulfate HFA (PROAIR HFA) 108 (90 BASE) MCG/ACT inhaler 1 puff by Other route      !! ammonium lactate (LAC-HYDRIN) 12 % lotion apply to dry area on feet/legs twice a day, Historical Med      aspirin (ECOTRIN LOW STRENGTH) 81 MG EC tablet Take 81 mg by mouth      carvedilol (COREG) 25 MG tablet Take 25 mg by mouth      furosemide (LASIX) 20 MG tablet TAKE 2 TABLETS EVERY MORNING AND 1 TABLET EVERY EVENING      insulin glargine (LANTUS SOLOSTAR) 100 UNIT/ML injection pen INJECT 22 UNITS AT BEDTIME      insulin lispro (HUMALOG KWIKPEN) 100 UNIT/ML pen Inject 6 to 8 units before breakfast, 8 units with lunch and 12 units at dinner      losartan-hydrochlorothiazide (HYZAAR) 100-25 MG per tablet Take 1 tablet by mouth      Niacin ER 1000 MG TBCR Take 2,000 mg by mouth      SODIUM CHLORIDE, EXTERNAL, 0.9 % SOLN Apply 1 Applicatorful topically daily, Disp-1000 mL, R-2       !! - Potential duplicate medications found. Please discuss with provider. ALLERGIES     Rocephin [ceftriaxone sodium]; Clindamycin; Clindamycin/lincomycin; Minocycline; Tetracyclines & related; and Ceftriaxone    FAMILY HISTORY     History reviewed. No pertinent family history.        SOCIAL HISTORY       Social History     Socioeconomic History    Marital status: Single     Spouse name: None    Number of children: None    Years of education: None    Highest education level: None   Occupational History    Occupation:    Social Needs    Financial resource strain: None    Food insecurity:     Worry: None     Inability: None    Transportation needs:     Medical: None     Non-medical: None   Tobacco Use    Smoking status: Former Smoker    Smokeless tobacco: Former User   Substance and Sexual Activity    Alcohol use: No    Drug use: No    Sexual activity: None   Lifestyle    Physical activity:     Days per week: None     Minutes per session: None    Stress: None   Relationships    Social connections:     Talks on phone: None     Gets together: None     Attends [employer] service: None     Active member of club or organization: None     Attends meetings of clubs or organizations: None     Relationship status: None    Intimate partner violence:     Fear of current or ex partner: None     Emotionally abused: None     Physically abused: None     Forced sexual activity: None   Other Topics Concern    None   Social History Narrative    None       SCREENINGS             PHYSICAL EXAM    (up to 7 for level 4, 8 or more for level 5)     ED Triage Vitals [12/29/19 1117]   BP Temp Temp Source Pulse Resp SpO2 Height Weight   (!) 155/72 98.7 °F (37.1 °C) Oral 97 22 93 % 5' 1\" (1.549 m) 269 lb (122 kg)       Physical Exam  Vitals signs and nursing note reviewed. Constitutional:       General: She is not in acute distress. Appearance: She is well-developed. She is not ill-appearing, toxic-appearing or diaphoretic. HENT:      Head: Normocephalic and atraumatic. Nose: Nose normal. No congestion or rhinorrhea. Mouth/Throat:      Pharynx: No oropharyngeal exudate or posterior oropharyngeal erythema. Eyes:      General: No scleral icterus. Right eye: No discharge. Left eye: No discharge. Conjunctiva/sclera: Conjunctivae normal.      Pupils: Pupils are equal, round, and reactive to light. Neck:      Musculoskeletal: Normal range of motion and neck supple. No neck rigidity or muscular tenderness. Thyroid: No thyromegaly. Vascular: No carotid bruit or JVD. Trachea: No tracheal deviation. Cardiovascular:      Rate and Rhythm: Normal rate and regular rhythm. Heart sounds: Normal heart sounds. No murmur. No friction rub. No gallop. findings:    Chest x-ray showed no acute cardiopulmonary process. Interpretation per the Radiologist below, if available at the time of this note:    XR CHEST PORTABLE   Final Result   No active lung disease                        ED BEDSIDE ULTRASOUND:   Performed by ED Physician - none    LABS:  Labs Reviewed   RAPID INFLUENZA A/B ANTIGENS - Abnormal; Notable for the following components:       Result Value    Influenza A by PCR POSITIVE (*)     All other components within normal limits   COMPREHENSIVE METABOLIC PANEL - Abnormal; Notable for the following components:    Glucose 232 (*)     All other components within normal limits   CBC WITH AUTO DIFFERENTIAL - Abnormal; Notable for the following components:    MCH 32.0 (*)     MCHC 32.8 (*)     RDW 15.3 (*)     Lymphocytes Absolute 0.9 (*)     All other components within normal limits   MAGNESIUM - Abnormal; Notable for the following components:    Magnesium 1.6 (*)     All other components within normal limits   TROPONIN   URINE RT REFLEX TO CULTURE   BRAIN NATRIURETIC PEPTIDE       All other labs were within normal range or not returned as of this dictation.     EMERGENCY DEPARTMENT COURSE and DIFFERENTIALDIAGNOSIS/MDM:   Vitals:    Vitals:    12/29/19 1117 12/29/19 1244   BP: (!) 155/72 (!) 161/70   Pulse: 97 86   Resp: 22 18   Temp: 98.7 °F (37.1 °C)    TempSrc: Oral    SpO2: 93% 94%   Weight: 269 lb (122 kg)    Height: 5' 1\" (1.549 m)            MDM  Number of Diagnoses or Management Options  Acute bronchitis, unspecified organism:   Influenza A:      Amount and/or Complexity of Data Reviewed  Clinical lab tests: reviewed and ordered  Tests in the radiology section of CPT®: reviewed and ordered    Risk of Complications, Morbidity, and/or Mortality  Presenting problems: moderate  Diagnostic procedures: moderate  Management options: moderate    Patient Progress  Patient progress: improved      CRITICAL CARE TIME   Total Critical Care time was  minutes,

## 2019-12-31 PROCEDURE — 93010 ELECTROCARDIOGRAM REPORT: CPT | Performed by: INTERNAL MEDICINE

## 2020-01-16 ENCOUNTER — HOSPITAL ENCOUNTER (OUTPATIENT)
Dept: CT IMAGING | Age: 75
Discharge: HOME | End: 2020-01-18
Payer: MEDICARE

## 2020-01-16 VITALS — WEIGHT: 257 LBS | BODY MASS INDEX: 48.52 KG/M2 | HEIGHT: 61 IN

## 2020-01-16 PROCEDURE — 6360000004 HC RX CONTRAST MEDICATION: Performed by: SURGERY

## 2020-01-16 PROCEDURE — 75635 CT ANGIO ABDOMINAL ARTERIES: CPT

## 2020-01-16 RX ORDER — SODIUM CHLORIDE 0.9 % (FLUSH) 0.9 %
10 SYRINGE (ML) INJECTION
Status: DISPENSED | OUTPATIENT
Start: 2020-01-16 | End: 2020-01-16

## 2020-01-16 RX ADMIN — IOPAMIDOL 150 ML: 755 INJECTION, SOLUTION INTRAVENOUS at 13:19

## 2020-01-17 ENCOUNTER — HOSPITAL ENCOUNTER (OUTPATIENT)
Dept: WOUND CARE | Age: 75
Discharge: HOME | End: 2020-01-17
Payer: MEDICARE

## 2020-01-17 VITALS
TEMPERATURE: 97.9 F | DIASTOLIC BLOOD PRESSURE: 65 MMHG | SYSTOLIC BLOOD PRESSURE: 108 MMHG | HEART RATE: 74 BPM | RESPIRATION RATE: 18 BRPM

## 2020-01-17 PROCEDURE — 99215 OFFICE O/P EST HI 40 MIN: CPT | Performed by: SURGERY

## 2020-01-17 PROCEDURE — 99213 OFFICE O/P EST LOW 20 MIN: CPT

## 2020-01-17 RX ORDER — HYDROCHLOROTHIAZIDE 25 MG/1
25 TABLET ORAL DAILY
Status: ON HOLD | COMMUNITY
End: 2020-12-07

## 2020-01-17 RX ORDER — LOSARTAN POTASSIUM 100 MG/1
100 TABLET ORAL DAILY
Status: ON HOLD | COMMUNITY
End: 2020-12-07

## 2020-01-17 NOTE — PROGRESS NOTES
Vascular Consult      Name: Sakshi Morrison  MRN: [id number]       Physician Requesting Consult:  Dr. Lizzy Godfrey     Reason for Consult:   PVD    Chief Complaint:      right leg ulcer        History of Present Illness:      Sakshi Morrison is a 69 y.o.  female who presents for wound/ulcer evaluation. History of Wound Context: Patient with history of right leg ulcer since July 2019. Patient states this is slowly healing. Denies fever or chills. No claudication or rest pain. Patient underwent CTA abd/pelvis with runoff. I have reviewed the imaging and this shows <30% stenosis the the bilateral sfa/pop arteries and no significant tibial disease. Wound/Ulcer Pain Timing/Severity: none  Quality of pain: N/A  Severity:  0 / 10   Modifying Factors: None  Associated Signs/Symptoms: edema    Ulcer Identification:  Ulcer Type: venous  Contributing Factors: edema and obesity       Past Medical History:     Past Medical History:   Diagnosis Date    Cancer ([facility] [address]     bladder, breast,     Hyperlipidemia     Hypertension     Osteoarthritis     Type II or unspecified type diabetes mellitus without mention of complication, not stated as uncontrolled         Past Surgical History:     Past Surgical History:   Procedure Laterality Date    BLADDER SURGERY      cancer    BREAST SURGERY      HERNIA REPAIR      HYSTERECTOMY          Medications Prior to Admission:       Prior to Admission medications    Medication Sig Start Date End Date Taking? Authorizing Provider   losartan (COZAAR) 100 MG tablet Take 100 mg by mouth daily   Yes Historical Provider, MD   hydrochlorothiazide (HYDRODIURIL) 25 MG tablet Take 25 mg by mouth daily   Yes Historical Provider, MD   potassium chloride (MICRO-K) 10 MEQ extended release capsule TAKE 1 TABLET TWICE A DAY 6/24/19  Yes Historical Provider, MD   ammonium lactate (LAC-HYDRIN) 12 % lotion Apply topically daily.  8/13/19  Yes Micheal Espinal DPM   acetaminophen (TYLENOL) 500 MG tablet Take 500 mg by mouth every 6 hours as needed for Pain   Yes Historical Provider, MD   albuterol sulfate HFA (PROAIR HFA) 108 (90 BASE) MCG/ACT inhaler 1 puff by Other route 10/19/15  Yes Historical Provider, MD   ammonium lactate (LAC-HYDRIN) 12 % lotion apply to dry area on feet/legs twice a day 8/8/16  Yes Historical Provider, MD   aspirin (ECOTRIN LOW STRENGTH) 81 MG EC tablet Take 81 mg by mouth 3/16/11  Yes Historical Provider, MD   carvedilol (COREG) 25 MG tablet Take 25 mg by mouth 8/8/16  Yes Historical Provider, MD   furosemide (LASIX) 20 MG tablet TAKE 2 TABLETS EVERY MORNING AND 1 TABLET EVERY EVENING 11/7/16  Yes Historical Provider, MD   insulin glargine (LANTUS SOLOSTAR) 100 UNIT/ML injection pen INJECT 22 UNITS AT BEDTIME 1/9/17  Yes Historical Provider, MD   insulin lispro (HUMALOG KWIKPEN) 100 UNIT/ML pen Inject 6 to 8 units before breakfast, 8 units with lunch and 12 units at dinner 3/5/16  Yes Historical Provider, MD   losartan-hydrochlorothiazide (HYZAAR) 100-25 MG per tablet Take 1 tablet by mouth 11/14/16  Yes Historical Provider, MD   Niacin ER 1000 MG TBCR Take 2,000 mg by mouth 2/23/17  Yes Historical Provider, MD   SODIUM CHLORIDE, EXTERNAL, 0.9 % SOLN Apply 1 Applicatorful topically daily 1/17/17  Yes Micheal Espinal, VINCE        Allergies:       Rocephin [ceftriaxone sodium]; Clindamycin; Clindamycin/lincomycin; Minocycline; Tetracyclines & related; and Ceftriaxone    Social History:     Tobacco:    reports that she has quit smoking. She has quit using smokeless tobacco.  Alcohol:      reports no history of alcohol use. Drug Use:  reports no history of drug use. Family History:     History reviewed. No pertinent family history.     Review of Systems:     Positive and Negative as described in HPI    Constitutional:  negative for  fevers, chills, sweats, fatigue, and weight loss  HEENT:  negative for vision or hearing changes,   Respiratory:  negative for shortness of breath, cough, or congestion  Cardiovascular:  negative for  chest pain, palpitations  Gastrointestinal:  negative for nausea, vomiting, diarrhea, constipation, abdominal pain  Genitourinary:  negative for frequency, dysuria  Integument:  negative for rash, skin lesions  Chest/Breast:  No painful inspiration or expiration, no rib sternal pain  Musculoskeletal:  negative for muscle aches or joint pain  Neurological:  negative for headaches, dizziness, lightheadedness, numbness, pain and tingling extremities  Lymphatics: positive for lympedema  Behavior/Psych:  negative for depression and anxiety    Physical Exam:     Vitals:  /65   Pulse 74   Temp 97.9 °F (36.6 °C) (Temporal)   Resp 18       General appearance - alert, well appearing and in no acute distress  Mental status - oriented to person, place and time with normal affect  Head - normocephalic and atraumatic  Eyes - pupils equal and reactive, extraocular eye movements intact, conjunctiva clear  Ears - hearing appears to be intact  Nose - no drainage noted  Mouth - mucous membranes moist  Neck - supple, no carotid bruits, thyroid not palpable, no JVD  Chest - clear to auscultation, normal effort  Heart - normal rate, regular rhythm, no murmurs  Abdomen - soft, non-tender, non-distended, bowel sounds present all four quadrants, no masses, hepatomegaly, splenomegaly or aortic enlargement  Neurological - normal speech, no focal findings or movement disorder noted, cranial nerves II through XII grossly intact  Extremities - 2+ ble edema with skin changes consistent with venous insufficiency.   Skin - no gross lesions, rashes, or induration noted  Foot Exam - see ulcer description below        R brachial 2+ L brachial 2+   R radial 2+ L radial 2+   R femoral 2+ L femoral 2+   R popliteal 2+ L popliteal 2+   R posterior tibial 0+ L posterior tibial 0+   R dorsalis pedis 2+ L dorsalis pedis 2+     Wound Care Documentation:  Wound 07/26/19 Leg Right;Lateral #1 (Active)   Wound Image   1/17/2020  1:18 PM   Wound Venous 1/17/2020  1:18 PM   Dressing/Treatment Antibacterial ointment 12/27/2019  2:03 PM   Wound Cleansed Rinsed/Irrigated with saline 1/17/2020  1:18 PM   Wound Length (cm) 2.8 cm 1/17/2020  1:18 PM   Wound Width (cm) 1 cm 1/17/2020  1:18 PM   Wound Depth (cm) 0.1 cm 1/17/2020  1:18 PM   Wound Surface Area (cm^2) 2.8 cm^2 1/17/2020  1:18 PM   Change in Wound Size % (l*w) 91.21 1/17/2020  1:18 PM   Wound Volume (cm^3) 0.28 cm^3 1/17/2020  1:18 PM   Wound Healing % 91 1/17/2020  1:18 PM   Drainage Amount Small 1/17/2020  1:18 PM   Drainage Description Serosanguinous 1/17/2020  1:18 PM   Odor None 1/17/2020  1:18 PM   Margins Defined edges 1/17/2020  1:18 PM   [facility]-wound Assessment Dry; Intact 1/17/2020  1:18 PM   Non-staged Wound Description Full thickness 1/17/2020  1:18 PM   Red%Wound Bed 90 1/17/2020  1:18 PM   Yellow%Wound Bed 10 1/17/2020  1:18 PM   Purple%Wound Bed 5 10/8/2019  3:41 PM   Number of days: 175       Wound 01/17/20 Leg Right;Lateral;Distal #2 (Active)   Wound Image   1/17/2020  1:18 PM   Wound Venous 1/17/2020  1:18 PM   Wound Cleansed Rinsed/Irrigated with saline 1/17/2020  1:18 PM   Wound Length (cm) 0.8 cm 1/17/2020  1:18 PM   Wound Width (cm) 0.5 cm 1/17/2020  1:18 PM   Wound Depth (cm) 0.1 cm 1/17/2020  1:18 PM   Wound Surface Area (cm^2) 0.4 cm^2 1/17/2020  1:18 PM   Wound Volume (cm^3) 0.04 cm^3 1/17/2020  1:18 PM   Drainage Amount Small 1/17/2020  1:18 PM   Drainage Description Serosanguinous 1/17/2020  1:18 PM   Odor None 1/17/2020  1:18 PM   Margins Defined edges 1/17/2020  1:18 PM   [facility]-wound Assessment Dry; Intact 1/17/2020  1:18 PM   Non-staged Wound Description Full thickness 1/17/2020  1:18 PM   [place]%Wound Bed 10 1/17/2020  1:18 PM   Yellow%Wound Bed 90 1/17/2020  1:18 PM   Number of days: 0               Imaging:    CTA abd/pelvis with runoff.   I have reviewed the imaging and this shows <30% stenosis the the bilateral sfa/pop arteries and no

## 2020-02-04 ENCOUNTER — HOSPITAL ENCOUNTER (OUTPATIENT)
Dept: WOUND CARE | Age: 75
Discharge: HOME | End: 2020-02-04
Payer: MEDICARE

## 2020-02-04 VITALS
HEART RATE: 76 BPM | DIASTOLIC BLOOD PRESSURE: 58 MMHG | SYSTOLIC BLOOD PRESSURE: 126 MMHG | RESPIRATION RATE: 16 BRPM | TEMPERATURE: 96.2 F

## 2020-02-04 PROCEDURE — 99213 OFFICE O/P EST LOW 20 MIN: CPT

## 2020-02-04 NOTE — CODE DOCUMENTATION
[address] [facility] Physician Billing Sheet. Abby Thrasher  AGE: 69 y.o.    GENDER: female  : 1945  TODAY'S DATE:  2020    ICD-10  [facility] Problems    Diagnosis Date Noted    Venous stasis ulcer of right calf with fat layer exposed without varicose veins ([facility] [address] [I87.2, L97.212] 2019    Lymphedema of both lower extremities [I89.0] 2019    Obesity due to excess calories [E66.09] 2017    Venous insufficiency (chronic) (peripheral) [I87.2] 2017    Venous ulcer of right leg ([facility] [address] [I83.019, L97.919] 2017       PHYSICIAN PROCEDURES    CPT CODE  12453      Electronically signed by Iraida Pham DPM on 2020 at 3:36 PM

## 2020-02-04 NOTE — PLAN OF CARE
Problem: Blood Glucose:  Goal: Ability to maintain appropriate glucose levels will improve  Description  Ability to maintain appropriate glucose levels will improve   Outcome: Ongoing     Problem: Wound:  Goal: Will show signs of wound healing; wound closure and no evidence of infection  Description  Will show signs of wound healing; wound closure and no evidence of infection   Outcome: Ongoing

## 2020-02-04 NOTE — PROGRESS NOTES
 potassium chloride (MICRO-K) 10 MEQ extended release capsule TAKE 1 TABLET TWICE A DAY      ammonium lactate (LAC-HYDRIN) 12 % lotion Apply topically daily. 500 g 3    acetaminophen (TYLENOL) 500 MG tablet Take 500 mg by mouth every 6 hours as needed for Pain      albuterol sulfate HFA (PROAIR HFA) 108 (90 BASE) MCG/ACT inhaler 1 puff by Other route      ammonium lactate (LAC-HYDRIN) 12 % lotion apply to dry area on feet/legs twice a day      aspirin (ECOTRIN LOW STRENGTH) 81 MG EC tablet Take 81 mg by mouth      carvedilol (COREG) 25 MG tablet Take 25 mg by mouth      furosemide (LASIX) 20 MG tablet TAKE 2 TABLETS EVERY MORNING AND 1 TABLET EVERY EVENING      insulin glargine (LANTUS SOLOSTAR) 100 UNIT/ML injection pen INJECT 22 UNITS AT BEDTIME      insulin lispro (HUMALOG KWIKPEN) 100 UNIT/ML pen Inject 6 to 8 units before breakfast, 8 units with lunch and 12 units at dinner      SODIUM CHLORIDE, EXTERNAL, 0.9 % SOLN Apply 1 Applicatorful topically daily 1000 mL 2    losartan-hydrochlorothiazide (HYZAAR) 100-25 MG per tablet Take 1 tablet by mouth      Niacin ER 1000 MG TBCR Take 2,000 mg by mouth       No current facility-administered medications on file prior to encounter. REVIEW OF SYSTEMS    Pertinent items are noted in HPI. Objective:      BP (!) 126/58   Pulse 76   Temp 96.2 °F (35.7 °C) (Temporal)   Resp 16     Wt Readings from Last 3 Encounters:   01/16/20 257 lb (116.6 kg)   12/29/19 269 lb (122 kg)   12/27/19 269 lb (122 kg)       PHYSICAL EXAM    Constitutional:   Well nourished and well developed. Appears neat and clean. Patient is alert, oriented x3, and in no apparent distress. Respiratory:  Respiratory effort is easy and symmetric bilaterally. Rate is normal at rest and on room air. Vascular:  Pedal Pulses is  palpable and audible signal noted with doppler.   Capillary refill is <5 sec to digits bilateral.  Extremities negative for pitting 2/4/2020 at 3:32 PM          Electronically signed by Lori Galicia DPM on 2/4/2020 at 3:34 PM

## 2020-02-25 ENCOUNTER — HOSPITAL ENCOUNTER (OUTPATIENT)
Dept: WOUND CARE | Age: 75
Discharge: HOME | End: 2020-02-25
Payer: MEDICARE

## 2020-02-25 VITALS
HEART RATE: 73 BPM | SYSTOLIC BLOOD PRESSURE: 127 MMHG | DIASTOLIC BLOOD PRESSURE: 68 MMHG | TEMPERATURE: 96.7 F | RESPIRATION RATE: 16 BRPM

## 2020-02-25 PROCEDURE — 99213 OFFICE O/P EST LOW 20 MIN: CPT

## 2020-02-25 PROCEDURE — 99213 OFFICE O/P EST LOW 20 MIN: CPT | Performed by: NURSE PRACTITIONER

## 2020-02-25 ASSESSMENT — PAIN SCALES - GENERAL: PAINLEVEL_OUTOF10: 0

## 2020-02-25 NOTE — DISCHARGE INSTR - COC
19 WOUND CULTURE        MRSA Right leg on 2019. Electronically signed by Ayah Knight RN on 2019 at 7:36 AM     MRSA Right leg on 2019.  Electronically signed by Ayah Knight RN on 2019 at 8:15 AM     Resolved    INFLUENZA 19 Rapid Influenza A/B Antigens   20           Nurse Assessment:  Last Vital Signs: /68   Pulse 73   Temp 96.7 °F (35.9 °C) (Temporal)   Resp 16     Last documented pain score (0-10 scale): Pain Level: 0  Last Weight:   Wt Readings from Last 1 Encounters:   20 257 lb (116.6 kg)     Mental Status:  {IP PT MENTAL STATUS:32916}    IV Access:  { [facility] IV ACCESS:983922269}    Nursing Mobility/ADLs:  Walking   {[facility] DME GAQB:799184141}  Transfer  {[facility] DME YADD:482858912}  Bathing  {[facility] DME QJZI:040881128}  Dressing  {[facility] DME WNKV:038016004}  Toileting  {[facility] DME OWUL:454549925}  Feeding  {[facility] DME PLYF:935531206}  Med Admin  {[facility] DME YJQB:860474942}  Med Delivery   { [facility] MED Delivery:376816068}    Wound Care Documentation and Therapy:  Wound 19 Leg Right;Lateral #1 (Active)   Wound Image   2020  3:20 PM   Wound Venous 2020  3:20 PM   Dressing/Treatment Antibacterial ointment 2019  2:03 PM   Wound Cleansed Rinsed/Irrigated with saline 2020  3:20 PM   Wound Length (cm) 0.4 cm 2020  3:20 PM   Wound Width (cm) 0.3 cm 2020  3:20 PM   Wound Depth (cm) 0.1 cm 2020  3:20 PM   Wound Surface Area (cm^2) 0.12 cm^2 2020  3:20 PM   Change in Wound Size % (l*w) 99.62 2020  3:20 PM   Wound Volume (cm^3) 0.01 cm^3 2020  3:20 PM   Wound Healing % 100 2020  3:20 PM   Drainage Amount Small 2020  3:20 PM   Drainage Description Serosanguinous 2020  3:20 PM   Odor None 2020  3:20 PM   Margins Defined edges 2020  3:20 PM   [facility]-wound Assessment Dry 2020  3:20 PM   Non-staged Wound Description Full thickness 2020  3:20 PM   Red%Wound Bed 100 2/25/2020  3:20 PM   Yellow%Wound Bed 10 2/4/2020  3:02 PM   Purple%Wound Bed 5 10/8/2019  3:41 PM   Number of days: 214       Wound 01/17/20 Leg Right;Lateral;Distal #2 (Active)   Wound Image   2/25/2020  3:20 PM   Wound Venous 2/25/2020  3:20 PM   Dressing/Treatment Antibacterial ointment 2/25/2020  4:11 PM   Wound Cleansed Rinsed/Irrigated with saline 2/25/2020  3:20 PM   Wound Length (cm) 0.4 cm 2/25/2020  3:20 PM   Wound Width (cm) 0.4 cm 2/25/2020  3:20 PM   Wound Depth (cm) 0.1 cm 2/25/2020  3:20 PM   Wound Surface Area (cm^2) 0.16 cm^2 2/25/2020  3:20 PM   Change in Wound Size % (l*w) 60 2/25/2020  3:20 PM   Wound Volume (cm^3) 0.02 cm^3 2/25/2020  3:20 PM   Wound Healing % 50 2/25/2020  3:20 PM   Drainage Amount Small 2/25/2020  3:20 PM   Drainage Description Serosanguinous 2/25/2020  3:20 PM   Odor None 2/25/2020  3:20 PM   Margins Defined edges 2/25/2020  3:20 PM   [facility]-wound Assessment Dry 2/25/2020  3:20 PM   Non-staged Wound Description Full thickness 2/25/2020  3:20 PM   [place]%Wound Bed 10 1/17/2020  1:18 PM   Red%Wound Bed 100 2/25/2020  3:20 PM   Yellow%Wound Bed 90 1/17/2020  1:18 PM   Number of days: 39        Elimination:  Continence:   · Bowel: {YES / TZ:82381}  · Bladder: {YES / IY:52069}  Urinary Catheter: {Urinary Catheter:179184062}   Colostomy/Ileostomy/Ileal Conduit: {YES / YT:68393}       Date of Last BM: ***  No intake or output data in the 24 hours ending 02/25/20 1618  No intake/output data recorded.     Safety Concerns:     508 bMobilized Safety Concerns:158973365}    Impairments/Disabilities:      508 bMobilized Impairments/Disabilities:608478953}    Nutrition Therapy:  Current Nutrition Therapy:   [address] [facility] Diet List:881933363}    Routes of Feeding: {[facility] DME Other Feedings:740555011}  Liquids: {Slp liquid thickness:68057}  Daily Fluid Restriction: {[facility] DME Yes amt example:960614452}  Last Modified Barium Swallow with Video (Video Swallowing Test): {Done Not Done DCTN:970127052}    Treatments

## 2020-02-25 NOTE — PROGRESS NOTES
tablet Take 25 mg by mouth daily      potassium chloride (MICRO-K) 10 MEQ extended release capsule TAKE 1 TABLET TWICE A DAY      ammonium lactate (LAC-HYDRIN) 12 % lotion Apply topically daily. 500 g 3    acetaminophen (TYLENOL) 500 MG tablet Take 500 mg by mouth every 6 hours as needed for Pain      albuterol sulfate HFA (PROAIR HFA) 108 (90 BASE) MCG/ACT inhaler 1 puff by Other route      ammonium lactate (LAC-HYDRIN) 12 % lotion apply to dry area on feet/legs twice a day      aspirin (ECOTRIN LOW STRENGTH) 81 MG EC tablet Take 81 mg by mouth      carvedilol (COREG) 25 MG tablet Take 25 mg by mouth      furosemide (LASIX) 20 MG tablet TAKE 2 TABLETS EVERY MORNING AND 1 TABLET EVERY EVENING      insulin glargine (LANTUS SOLOSTAR) 100 UNIT/ML injection pen INJECT 22 UNITS AT BEDTIME      insulin lispro (HUMALOG KWIKPEN) 100 UNIT/ML pen Inject 6 to 8 units before breakfast, 8 units with lunch and 12 units at dinner      losartan-hydrochlorothiazide (HYZAAR) 100-25 MG per tablet Take 1 tablet by mouth      Niacin ER 1000 MG TBCR Take 2,000 mg by mouth      SODIUM CHLORIDE, EXTERNAL, 0.9 % SOLN Apply 1 Applicatorful topically daily 1000 mL 2     No current facility-administered medications on file prior to encounter. REVIEW OF SYSTEMS    Pertinent items are noted in HPI. Objective:      /68   Pulse 73   Temp 96.7 °F (35.9 °C) (Temporal)   Resp 16     Wt Readings from Last 3 Encounters:   01/16/20 257 lb (116.6 kg)   12/29/19 269 lb (122 kg)   12/27/19 269 lb (122 kg)       PHYSICAL EXAM    Constitutional:   Well nourished and well developed. Appears neat and clean. Patient is alert, oriented x3, and in no apparent distress. Respiratory:  Respiratory effort is easy and symmetric bilaterally. Rate is normal at rest and on room air. Vascular:  Pedal Pulses is palpable and audible signal noted with doppler.   Capillary refill is <5 sec to digits bilateral.  Extremities negative for pitting edema. Neurological:  Gross and Light touch intact. Protective sensation is diminished. Dermatological:  Wound description noted in wound assessment. Wounds have improved in size/depth, edema improved. Psychiatric:  Judgement and insight intact. Short and long term memory intact. No evidence of depression, anxiety, or agitation. Patient is calm, cooperative, and communicative. Appropriate interactions and affect. Assessment:      Problem List Items Addressed This Visit     None           Procedure Note  Indications:  Based on my examination of this patient's wound(s)/ulcer(s) today, debridement is not required to promote healing and evaluate the wound base.     Wound 07/26/19 Leg Right;Lateral #1 (Active)   Wound Image   2/25/2020  3:20 PM   Wound Venous 2/25/2020  3:20 PM   Dressing/Treatment Antibacterial ointment 12/27/2019  2:03 PM   Wound Cleansed Rinsed/Irrigated with saline 2/25/2020  3:20 PM   Wound Length (cm) 0.4 cm 2/25/2020  3:20 PM   Wound Width (cm) 0.3 cm 2/25/2020  3:20 PM   Wound Depth (cm) 0.1 cm 2/25/2020  3:20 PM   Wound Surface Area (cm^2) 0.12 cm^2 2/25/2020  3:20 PM   Change in Wound Size % (l*w) 99.62 2/25/2020  3:20 PM   Wound Volume (cm^3) 0.01 cm^3 2/25/2020  3:20 PM   Wound Healing % 100 2/25/2020  3:20 PM   Drainage Amount Small 2/25/2020  3:20 PM   Drainage Description Serosanguinous 2/25/2020  3:20 PM   Odor None 2/25/2020  3:20 PM   Margins Defined edges 2/25/2020  3:20 PM   [facility]-wound Assessment Dry 2/25/2020  3:20 PM   Non-staged Wound Description Full thickness 2/25/2020  3:20 PM   Red%Wound Bed 100 2/25/2020  3:20 PM   Yellow%Wound Bed 10 2/4/2020  3:02 PM   Purple%Wound Bed 5 10/8/2019  3:41 PM   Number of days: 214       Wound 01/17/20 Leg Right;Lateral;Distal #2 (Active)   Wound Image   2/25/2020  3:20 PM   Wound Venous 2/25/2020  3:20 PM   Dressing/Treatment Antibacterial ointment 2/25/2020  4:11 PM   Wound Cleansed Rinsed/Irrigated with saline 2/25/2020  3:20 PM   Wound Length (cm) 0.4 cm 2/25/2020  3:20 PM   Wound Width (cm) 0.4 cm 2/25/2020  3:20 PM   Wound Depth (cm) 0.1 cm 2/25/2020  3:20 PM   Wound Surface Area (cm^2) 0.16 cm^2 2/25/2020  3:20 PM   Change in Wound Size % (l*w) 60 2/25/2020  3:20 PM   Wound Volume (cm^3) 0.02 cm^3 2/25/2020  3:20 PM   Wound Healing % 50 2/25/2020  3:20 PM   Drainage Amount Small 2/25/2020  3:20 PM   Drainage Description Serosanguinous 2/25/2020  3:20 PM   Odor None 2/25/2020  3:20 PM   Margins Defined edges 2/25/2020  3:20 PM   [facility]-wound Assessment Dry 2/25/2020  3:20 PM   Non-staged Wound Description Full thickness 2/25/2020  3:20 PM   [place]%Wound Bed 10 1/17/2020  1:18 PM   Red%Wound Bed 100 2/25/2020  3:20 PM   Yellow%Wound Bed 90 1/17/2020  1:18 PM   Number of days: 39                  Plan:     Reviewed with patient need to apply gentamicin to wound bed, then cover with sorbion/sorbact to wound bed, then cover with DSD. Apply compression as previously ordered by Dr. Lena Rosario. May remove at h.s. and re-apply in a.m. Elevate legs when not walking, and attempt to reduce sodium intake in diet. Treatment Note please see attached Discharge Instructions    Written patient dismissal instructions given to patient and signed by patient or POA. Discharge [facility] Orders and Discharge Instructions  [address]  Telephone: [phone]      [phone]        NAME: Shay Delgado  DATE OF BIRTH:  1945  MEDICAL RECORD NUMBER:  [id number]     Home Care/Facility:  NONE     Wound Location:  RIGHT LOWER LEG - 2 areas     Dressing orders: 1. Cleanse wound(s) with normal saline. 2. APPLY GENTAMICIN OINTMENT TO WOUNDS  3. Apply CUTIMED SORBION SORBACT  to wound bed. 4. Cover with DRY DRESSING.   5. Change  EVERY DAY.     Compression:  Apply 10-20MMhG compression hose to BILATERAL lower leg(s), may remove at bedtime, reapply first thing in the morning, avoid prolonged standing, elevate legs when sitting.     Offloading Device:  NONE     Other Instructions:  DECREASE SALT INTAKE, ELEVATE LEGS AS MUCH AS POSSIBLE, USE LYMPHEDEMA PUMPS FOR 30 MINUTES A DAY IF TOLERATED, Keep all dressings clean, dry and intact.  Keep pressure off the wound(s) at all times.      Follow up visit: Garfield Narvaez 10 17, 2020      Please give 24 hour notice if unable to keep appointment. [phone]     If you experience any of the following, please call the Wound Care Service at  [phone] or go to the nearest emergency room. *Increase in pain         *Temperature over 101           *Increase in drainage from your wound or a foul odor  *Uncontrolled swelling            *Need for compression bandage changes due to slippage, breakthrough drainage    Electronically signed by KELI Bradley NP on 2/25/2020 at 4:17 PM     PLEASE NOTE: IF YOU ARE UNABLE TO OBTAIN WOUND SUPPLIES, CONTINUE TO USE THE SUPPLIES YOU HAVE AVAILABLE UNTIL YOU ARE ABLE TO [facility].  IT IS MOST IMPORTANT TO KEEP THE WOUND COVERED AT ALL TIMES         Electronically signed by KELI Bradley NP on 2/25/2020 at 4:18 PM

## 2020-03-17 ENCOUNTER — TELEPHONE (OUTPATIENT)
Dept: WOUND CARE | Age: 75
End: 2020-03-17

## 2020-08-07 ENCOUNTER — HOSPITAL ENCOUNTER (EMERGENCY)
Age: 75
Discharge: ANOTHER HOSPITAL | End: 2020-08-07
Attending: EMERGENCY MEDICINE
Payer: MEDICARE

## 2020-08-07 ENCOUNTER — APPOINTMENT (OUTPATIENT)
Dept: CT IMAGING | Age: 75
End: 2020-08-07
Payer: MEDICARE

## 2020-08-07 VITALS
DIASTOLIC BLOOD PRESSURE: 60 MMHG | SYSTOLIC BLOOD PRESSURE: 124 MMHG | TEMPERATURE: 97.8 F | BODY MASS INDEX: 52.42 KG/M2 | RESPIRATION RATE: 18 BRPM | HEART RATE: 104 BPM | HEIGHT: 60 IN | OXYGEN SATURATION: 100 % | WEIGHT: 267 LBS

## 2020-08-07 LAB
ALBUMIN SERPL-MCNC: 3.9 G/DL (ref 3.5–4.6)
ALP BLD-CCNC: 95 U/L (ref 40–130)
ALT SERPL-CCNC: 10 U/L (ref 0–33)
AMYLASE: 13 U/L (ref 22–93)
ANION GAP SERPL CALCULATED.3IONS-SCNC: 13 MEQ/L (ref 9–15)
AST SERPL-CCNC: 14 U/L (ref 0–35)
BASOPHILS ABSOLUTE: 0 K/UL (ref 0–0.2)
BASOPHILS RELATIVE PERCENT: 0.4 %
BILIRUB SERPL-MCNC: 0.8 MG/DL (ref 0.2–0.7)
BILIRUBIN URINE: NEGATIVE
BLOOD, URINE: NEGATIVE
BUN BLDV-MCNC: 19 MG/DL (ref 8–23)
CALCIUM SERPL-MCNC: 9.2 MG/DL (ref 8.5–9.9)
CHLORIDE BLD-SCNC: 95 MEQ/L (ref 95–107)
CLARITY: CLEAR
CO2: 25 MEQ/L (ref 20–31)
COLOR: YELLOW
CREAT SERPL-MCNC: 0.67 MG/DL (ref 0.5–0.9)
EKG ATRIAL RATE: 128 BPM
EKG P AXIS: 31 DEGREES
EKG P-R INTERVAL: 156 MS
EKG Q-T INTERVAL: 318 MS
EKG QRS DURATION: 112 MS
EKG QTC CALCULATION (BAZETT): 464 MS
EKG R AXIS: -35 DEGREES
EKG T AXIS: 115 DEGREES
EKG VENTRICULAR RATE: 128 BPM
EOSINOPHILS ABSOLUTE: 0.1 K/UL (ref 0–0.7)
EOSINOPHILS RELATIVE PERCENT: 1.3 %
GFR AFRICAN AMERICAN: >60
GFR NON-AFRICAN AMERICAN: >60
GLOBULIN: 2.6 G/DL (ref 2.3–3.5)
GLUCOSE BLD-MCNC: 164 MG/DL (ref 70–99)
GLUCOSE URINE: NEGATIVE MG/DL
HCT VFR BLD CALC: 40 % (ref 37–47)
HEMOGLOBIN: 13.3 G/DL (ref 12–16)
KETONES, URINE: NEGATIVE MG/DL
LEUKOCYTE ESTERASE, URINE: NEGATIVE
LIPASE: 16 U/L (ref 12–95)
LYMPHOCYTES ABSOLUTE: 1.6 K/UL (ref 1–4.8)
LYMPHOCYTES RELATIVE PERCENT: 16.2 %
MCH RBC QN AUTO: 31.9 PG (ref 27–31.3)
MCHC RBC AUTO-ENTMCNC: 33.1 % (ref 33–37)
MCV RBC AUTO: 96.4 FL (ref 82–100)
MONOCYTES ABSOLUTE: 1 K/UL (ref 0.2–0.8)
MONOCYTES RELATIVE PERCENT: 9.6 %
NEUTROPHILS ABSOLUTE: 7.4 K/UL (ref 1.4–6.5)
NEUTROPHILS RELATIVE PERCENT: 72.5 %
NITRITE, URINE: NEGATIVE
PDW BLD-RTO: 13.6 % (ref 11.5–14.5)
PH UA: 5.5 (ref 5–9)
PLATELET # BLD: 244 K/UL (ref 130–400)
POTASSIUM SERPL-SCNC: 3.9 MEQ/L (ref 3.4–4.9)
PROTEIN UA: NEGATIVE MG/DL
RBC # BLD: 4.15 M/UL (ref 4.2–5.4)
SODIUM BLD-SCNC: 133 MEQ/L (ref 135–144)
SPECIFIC GRAVITY UA: <=1.005 (ref 1–1.03)
TOTAL PROTEIN: 6.5 G/DL (ref 6.3–8)
TSH SERPL DL<=0.05 MIU/L-ACNC: 4.32 UIU/ML (ref 0.44–3.86)
URINE REFLEX TO CULTURE: NORMAL
UROBILINOGEN, URINE: 0.2 E.U./DL
WBC # BLD: 10.2 K/UL (ref 4.8–10.8)

## 2020-08-07 PROCEDURE — 74177 CT ABD & PELVIS W/CONTRAST: CPT

## 2020-08-07 PROCEDURE — 82150 ASSAY OF AMYLASE: CPT

## 2020-08-07 PROCEDURE — 96375 TX/PRO/DX INJ NEW DRUG ADDON: CPT

## 2020-08-07 PROCEDURE — 93005 ELECTROCARDIOGRAM TRACING: CPT

## 2020-08-07 PROCEDURE — 80053 COMPREHEN METABOLIC PANEL: CPT

## 2020-08-07 PROCEDURE — 96374 THER/PROPH/DIAG INJ IV PUSH: CPT

## 2020-08-07 PROCEDURE — 2580000003 HC RX 258: Performed by: EMERGENCY MEDICINE

## 2020-08-07 PROCEDURE — 6360000004 HC RX CONTRAST MEDICATION: Performed by: EMERGENCY MEDICINE

## 2020-08-07 PROCEDURE — 84443 ASSAY THYROID STIM HORMONE: CPT

## 2020-08-07 PROCEDURE — 6360000002 HC RX W HCPCS: Performed by: EMERGENCY MEDICINE

## 2020-08-07 PROCEDURE — 81003 URINALYSIS AUTO W/O SCOPE: CPT

## 2020-08-07 PROCEDURE — 99285 EMERGENCY DEPT VISIT HI MDM: CPT

## 2020-08-07 PROCEDURE — 83690 ASSAY OF LIPASE: CPT

## 2020-08-07 PROCEDURE — 85025 COMPLETE CBC W/AUTO DIFF WBC: CPT

## 2020-08-07 PROCEDURE — 36415 COLL VENOUS BLD VENIPUNCTURE: CPT

## 2020-08-07 RX ORDER — KETOROLAC TROMETHAMINE 30 MG/ML
30 INJECTION, SOLUTION INTRAMUSCULAR; INTRAVENOUS ONCE
Status: COMPLETED | OUTPATIENT
Start: 2020-08-07 | End: 2020-08-07

## 2020-08-07 RX ORDER — SODIUM CHLORIDE 0.9 % (FLUSH) 0.9 %
3 SYRINGE (ML) INJECTION EVERY 8 HOURS
Status: DISCONTINUED | OUTPATIENT
Start: 2020-08-07 | End: 2020-08-07 | Stop reason: HOSPADM

## 2020-08-07 RX ORDER — LORAZEPAM 2 MG/ML
1 INJECTION INTRAMUSCULAR ONCE
Status: COMPLETED | OUTPATIENT
Start: 2020-08-07 | End: 2020-08-07

## 2020-08-07 RX ORDER — ONDANSETRON 2 MG/ML
4 INJECTION INTRAMUSCULAR; INTRAVENOUS ONCE
Status: COMPLETED | OUTPATIENT
Start: 2020-08-07 | End: 2020-08-07

## 2020-08-07 RX ORDER — SODIUM CHLORIDE 9 MG/ML
INJECTION, SOLUTION INTRAVENOUS CONTINUOUS
Status: DISCONTINUED | OUTPATIENT
Start: 2020-08-07 | End: 2020-08-07 | Stop reason: HOSPADM

## 2020-08-07 RX ADMIN — IOPAMIDOL 100 ML: 755 INJECTION, SOLUTION INTRAVENOUS at 10:47

## 2020-08-07 RX ADMIN — Medication 3 ML: at 10:07

## 2020-08-07 RX ADMIN — KETOROLAC TROMETHAMINE 30 MG: 30 INJECTION, SOLUTION INTRAMUSCULAR at 10:04

## 2020-08-07 RX ADMIN — SODIUM CHLORIDE 100 ML/HR: 9 INJECTION, SOLUTION INTRAVENOUS at 09:59

## 2020-08-07 RX ADMIN — LORAZEPAM 1 MG: 2 INJECTION INTRAMUSCULAR; INTRAVENOUS at 10:06

## 2020-08-07 RX ADMIN — ONDANSETRON 4 MG: 2 INJECTION INTRAMUSCULAR; INTRAVENOUS at 10:02

## 2020-08-07 ASSESSMENT — ENCOUNTER SYMPTOMS
ABDOMINAL PAIN: 1
CHEST TIGHTNESS: 0
VOMITING: 0
BLOOD IN STOOL: 0
WHEEZING: 0
CONSTIPATION: 1
COUGH: 0
EYE DISCHARGE: 0
SHORTNESS OF BREATH: 0
EYE PAIN: 0
SINUS PRESSURE: 0
CHOKING: 0
STRIDOR: 0
BACK PAIN: 0
EYE REDNESS: 0
TROUBLE SWALLOWING: 0
SORE THROAT: 0
FACIAL SWELLING: 0
VOICE CHANGE: 0
DIARRHEA: 0

## 2020-08-07 ASSESSMENT — PAIN DESCRIPTION - DESCRIPTORS: DESCRIPTORS: PRESSURE;CONSTANT

## 2020-08-07 ASSESSMENT — PAIN SCALES - GENERAL
PAINLEVEL_OUTOF10: 2
PAINLEVEL_OUTOF10: 4
PAINLEVEL_OUTOF10: 4

## 2020-08-07 ASSESSMENT — PAIN DESCRIPTION - ONSET
ONSET: ON-GOING
ONSET: ON-GOING

## 2020-08-07 ASSESSMENT — PAIN DESCRIPTION - PAIN TYPE
TYPE: ACUTE PAIN
TYPE: ACUTE PAIN

## 2020-08-07 ASSESSMENT — PAIN DESCRIPTION - LOCATION
LOCATION: ABDOMEN
LOCATION: ABDOMEN;GENERALIZED

## 2020-08-07 ASSESSMENT — PAIN DESCRIPTION - PROGRESSION: CLINICAL_PROGRESSION: GRADUALLY IMPROVING

## 2020-08-07 NOTE — ED PROVIDER NOTES
[facility] ED  eMERGENCY dEPARTMENT eNCOUnter      Pt Name: Javier Anne  MRN: [id number]  [place] 1945  Date of evaluation: 8/7/2020  Provider: Lucio Wang MD    [address] [place] [address]       Chief Complaint   Patient presents with    Abdominal Pain     Pt c/o abd pain,onset 2pm yesterday, deneis N/V/D, no BM x 2 days         HISTORY OF PRESENT ILLNESS   (Location/Symptom, Timing/Onset,Context/Setting, Quality, Duration, Modifying Factors, Severity)  Note limiting factors. Javier Anne is a 77 y.o. female who presents to the emergency department patient with a history of type 2 diabetes remote breast cancer hypertension history of increased cholesterol patient is status post hysterectomy and remote bladder surgery and breast surgery in the past come to this emergency because of the abdominal pain started yesterday no nausea no vomiting last bowel movement was 2 days ago fever no chills as per patient she also has a history of bladder cancer no fever no chills less appetite yesterday and today has a chronic bulge to the abdomen for the past several years    HPI    NursingNotes were reviewed. REVIEW OF SYSTEMS    (2-9 systems for level 4, 10 or more for level 5)     Review of Systems   Constitutional: Negative. Negative for activity change and fever. HENT: Negative for congestion, drooling, facial swelling, mouth sores, nosebleeds, sinus pressure, sore throat, trouble swallowing and voice change. Eyes: Negative for pain, discharge, redness and visual disturbance. Respiratory: Negative for cough, choking, chest tightness, shortness of breath, wheezing and stridor. Cardiovascular: Negative for chest pain, palpitations and leg swelling. Gastrointestinal: Positive for abdominal pain and constipation. Negative for blood in stool, diarrhea and vomiting. Endocrine: Negative for cold intolerance, polyphagia and polyuria.    Genitourinary: Negative for dysuria, flank pain, frequency, genital sores and urgency. Musculoskeletal: Negative for back pain, joint swelling, neck pain and neck stiffness. Skin: Negative for pallor and rash. Neurological: Negative for tremors, seizures, syncope, weakness, numbness and headaches. Hematological: Negative for adenopathy. Does not bruise/bleed easily. Psychiatric/Behavioral: Negative for agitation, behavioral problems, hallucinations and sleep disturbance. The patient is nervous/anxious. The patient is not hyperactive. All other systems reviewed and are negative. Except as noted above the remainder of the review of systems was reviewed and negative. PAST MEDICAL HISTORY     Past Medical History:   Diagnosis Date    Cancer [facility])     bladder, breast,     Hyperlipidemia     Hypertension     Osteoarthritis     Type II or unspecified type diabetes mellitus without mention of complication, not stated as uncontrolled          SURGICALHISTORY       Past Surgical History:   Procedure Laterality Date    BLADDER SURGERY      cancer    BREAST SURGERY      HERNIA REPAIR      HYSTERECTOMY           CURRENT MEDICATIONS       Previous Medications    ACETAMINOPHEN (TYLENOL) 500 MG TABLET    Take 500 mg by mouth every 6 hours as needed for Pain    ALBUTEROL SULFATE HFA (PROAIR HFA) 108 (90 BASE) MCG/ACT INHALER    1 puff by Other route    AMMONIUM LACTATE (LAC-HYDRIN) 12 % LOTION    apply to dry area on feet/legs twice a day    AMMONIUM LACTATE (LAC-HYDRIN) 12 % LOTION    Apply topically daily.     ASPIRIN (ECOTRIN LOW STRENGTH) 81 MG EC TABLET    Take 81 mg by mouth    CARVEDILOL (COREG) 25 MG TABLET    Take 25 mg by mouth    FUROSEMIDE (LASIX) 20 MG TABLET    TAKE 2 TABLETS EVERY MORNING AND 1 TABLET EVERY EVENING    HYDROCHLOROTHIAZIDE (HYDRODIURIL) 25 MG TABLET    Take 25 mg by mouth daily    INSULIN GLARGINE (LANTUS SOLOSTAR) 100 UNIT/ML INJECTION PEN    INJECT 22 UNITS AT BEDTIME    INSULIN LISPRO (HUMALOG KWIKPEN) 100 UNIT/ML PEN    Inject 6 to 8 units before breakfast, 8 units with lunch and 12 units at dinner    LOSARTAN (COZAAR) 100 MG TABLET    Take 100 mg by mouth daily    LOSARTAN-HYDROCHLOROTHIAZIDE (HYZAAR) 100-25 MG PER TABLET    Take 1 tablet by mouth    NIACIN ER 1000 MG TBCR    Take 2,000 mg by mouth    POTASSIUM CHLORIDE (MICRO-K) 10 MEQ EXTENDED RELEASE CAPSULE    TAKE 1 TABLET TWICE A DAY    SODIUM CHLORIDE, EXTERNAL, 0.9 % SOLN    Apply 1 Applicatorful topically daily       ALLERGIES     Rocephin [ceftriaxone sodium]; Clindamycin; Clindamycin/lincomycin; Minocycline; Tetracyclines & related; and Ceftriaxone    FAMILY HISTORY     History reviewed. No pertinent family history.        SOCIAL HISTORY       Social History     Socioeconomic History    Marital status: Single     Spouse name: None    Number of children: None    Years of education: None    Highest education level: None   Occupational History    Occupation:    Social Needs    Financial resource strain: None    Food insecurity     Worry: None     Inability: None    Transportation needs     Medical: None     Non-medical: None   Tobacco Use    Smoking status: Former Smoker    Smokeless tobacco: Never Used    Tobacco comment: quit 40 years ago   Substance and Sexual Activity    Alcohol use: No    Drug use: No    Sexual activity: None   Lifestyle    Physical activity     Days per week: None     Minutes per session: None    Stress: None   Relationships    Social connections     Talks on phone: None     Gets together: None     Attends Yarsani service: None     Active member of club or organization: None     Attends meetings of clubs or organizations: None     Relationship status: None    Intimate partner violence     Fear of current or ex partner: None     Emotionally abused: None     Physically abused: None     Forced sexual activity: None   Other Topics Concern    None   Social History Narrative    None       SCREENINGS      @FLOW([id number])@      PHYSICAL EXAM (up to 7 for level 4, 8 or more for level 5)     ED Triage Vitals [08/07/20 0932]   BP Temp Temp Source Pulse Resp SpO2 Height Weight   (!) 143/66 98.2 °F (36.8 °C) Oral 131 20 96 % 5' (1.524 m) 267 lb (121.1 kg)       Physical Exam  Vitals signs and nursing note reviewed. Constitutional:       Appearance: She is well-developed. She is obese. Comments: Alert cooperative patient slightly anxious and slightly uncomfortable because of abdominal discomfort   HENT:      Head: Normocephalic and atraumatic. Right Ear: Tympanic membrane, ear canal and external ear normal.      Left Ear: Tympanic membrane, ear canal and external ear normal.      Nose: No congestion or rhinorrhea. Mouth/Throat:      Mouth: Mucous membranes are moist.   Eyes:      General:         Right eye: No discharge. Left eye: No discharge. Extraocular Movements: Extraocular movements intact. Neck:      Musculoskeletal: Normal range of motion and neck supple. Cardiovascular:      Rate and Rhythm: Regular rhythm. Tachycardia present. Heart sounds: Normal heart sounds. No murmur. No gallop. Pulmonary:      Effort: Pulmonary effort is normal. No respiratory distress. Breath sounds: Normal breath sounds. No wheezing. Abdominal:      General: Abdomen is flat. Bowel sounds are normal. There is distension. Palpations: Abdomen is soft. There is no mass. Tenderness: There is abdominal tenderness. There is no rebound. Comments: Attention go to abdomen good bowel sounds patient has mild diffuse tenderness without any rebound tenderness noted to have abdominal wall hernia to the left side of the abdomen with minimal tenderness patient no Lam sign no McBurney point tenderness   Musculoskeletal: Normal range of motion. General: No tenderness. Skin:     General: Skin is warm. Findings: No erythema or rash.    Neurological:      Mental Status: She is alert and oriented to person, place, 4.15 (*)     MCH 31.9 (*)     Neutrophils Absolute 7.4 (*)     Monocytes Absolute 1.0 (*)     All other components within normal limits   AMYLASE - Abnormal; Notable for the following components:    Amylase 13 (*)     All other components within normal limits   LIPASE   URINE RT REFLEX TO CULTURE   TSH WITHOUT REFLEX       All other labs were within normal range or not returned as of this dictation. EMERGENCY DEPARTMENT COURSE and DIFFERENTIAL DIAGNOSIS/MDM:   Vitals:    Vitals:    08/07/20 0932 08/07/20 1109   BP: (!) 143/66 118/68   Pulse: 131 112   Resp: 20 18   Temp: 98.2 °F (36.8 °C)    TempSrc: Oral    SpO2: 96% 99%   Weight: 267 lb (121.1 kg)    Height: 5' (1.524 m)        MDM  Number of Diagnoses or Management Options  Diagnosis management comments: Patient with abdominal discomfort has abdominal wall hernia surrounding area is painful without any rebound tenderness patient noted to be tachycardic on arrival denies any chest tightness or short of breath EKG performed patient has sinus tachycardia with PACs left axis deviation incomplete right bundle branch block along with left ventricular hypertrophy rate of 128/min IN interval 156 ms QRS duration 1 1 2 ms while QT interval 3 1 8 ms, patient CAT scan report shows patient has a large abdominal wall hernia with the loops of small bowel consistent with small bowel obstruction patient requesting transfer to [address] [place] [address] Case discussed with the on-call surgeon at [address] [place] [address], patient is a preferred transfer       Amount and/or Complexity of Data Reviewed  Clinical lab tests: ordered and reviewed  Tests in the radiology section of CPT®: ordered and reviewed      CRITICAL CARE TIME   Total Critical Care time was  minutes, excluding separately reportableprocedures. There was a high probability of clinicallysignificant/life threatening deterioration in the patient's condition which required my urgent intervention. CONSULTS:  None    PROCEDURES:  Unless otherwise noted below, none     Procedures    FINAL IMPRESSION      1. SBO (small bowel obstruction) ([address]    2. Abdominal wall hernia    3. Left upper quadrant pain          DISPOSITION/PLAN   DISPOSITION        PATIENT REFERRED TO:  No follow-up provider specified.     DISCHARGE MEDICATIONS:  New Prescriptions    No medications on file          (Please note that portions of this note were completed with a voice recognition program.  Efforts were made to edit the dictations but occasionally words are mis-transcribed.)    Valentino Hench, MD (electronically signed)  Attending Emergency Physician       Valentino Hench, MD  08/07/20 1300

## 2020-08-07 NOTE — ED NOTES
Called [facility] for ETA at 2:30. Was told at least another hour for pickup. Made another call for an update on current ETA and was told they will be here in 10 min.       Megan Ambrose  08/07/20 3936

## 2020-08-07 NOTE — ED NOTES
Called [facility] transfer center at 12:30 to marcial general surgeon.       Corita Sandifer  08/07/20 1350

## 2020-08-07 NOTE — ED NOTES
from [facility] called and spoke with Dr Home Walsh.       Merlin Wilson  08/07/20 7697

## 2020-08-07 NOTE — ED NOTES
Pt advised of need for urine, unable to go at this time. Pt placed on O2 2L NC for sleep apnea and low pulse ox. Family remains at bedside.      Sacha Rangel RN  08/07/20 2986

## 2020-08-07 NOTE — ED NOTES
Pt assigned to [address] [place] [address] bed 559-1. Number for report is [phone].       Nena Arita  08/07/20 1310

## 2020-08-09 PROCEDURE — 93010 ELECTROCARDIOGRAM REPORT: CPT | Performed by: INTERNAL MEDICINE

## 2020-12-07 ENCOUNTER — HOSPITAL ENCOUNTER (EMERGENCY)
Dept: ULTRASOUND IMAGING | Age: 75
Discharge: HOME | DRG: 300 | End: 2020-12-09
Payer: MEDICARE

## 2020-12-07 ENCOUNTER — HOSPITAL ENCOUNTER (INPATIENT)
Age: 75
LOS: 3 days | Discharge: NURSING, REHAB OR LONG-TERM CARE | DRG: 300 | End: 2020-12-10
Attending: EMERGENCY MEDICINE | Admitting: INTERNAL MEDICINE
Payer: MEDICARE

## 2020-12-07 ENCOUNTER — APPOINTMENT (OUTPATIENT)
Dept: GENERAL RADIOLOGY | Age: 75
DRG: 300 | End: 2020-12-07
Payer: MEDICARE

## 2020-12-07 PROBLEM — I82.4Z3 DVT, LOWER EXTREMITY, DISTAL, ACUTE, BILATERAL (HCC): Status: ACTIVE | Noted: 2020-12-07

## 2020-12-07 LAB
ALBUMIN SERPL-MCNC: 3.7 G/DL (ref 3.5–4.6)
ALP BLD-CCNC: 130 U/L (ref 40–130)
ALT SERPL-CCNC: 9 U/L (ref 0–33)
AMORPHOUS: ABNORMAL
ANION GAP SERPL CALCULATED.3IONS-SCNC: 12 MEQ/L (ref 9–15)
AST SERPL-CCNC: 11 U/L (ref 0–35)
BACTERIA: ABNORMAL /HPF
BASOPHILS ABSOLUTE: 0 K/UL (ref 0–0.2)
BASOPHILS RELATIVE PERCENT: 0.6 %
BILIRUB SERPL-MCNC: 0.6 MG/DL (ref 0.2–0.7)
BILIRUBIN URINE: NEGATIVE
BLOOD, URINE: ABNORMAL
BUN BLDV-MCNC: 19 MG/DL (ref 8–23)
CALCIUM SERPL-MCNC: 9.3 MG/DL (ref 8.5–9.9)
CHLORIDE BLD-SCNC: 95 MEQ/L (ref 95–107)
CLARITY: CLEAR
CO2: 28 MEQ/L (ref 20–31)
COLOR: YELLOW
CREAT SERPL-MCNC: 0.62 MG/DL (ref 0.5–0.9)
EKG ATRIAL RATE: 104 BPM
EKG P AXIS: 41 DEGREES
EKG P-R INTERVAL: 172 MS
EKG Q-T INTERVAL: 382 MS
EKG QRS DURATION: 116 MS
EKG QTC CALCULATION (BAZETT): 502 MS
EKG R AXIS: -33 DEGREES
EKG T AXIS: 131 DEGREES
EKG VENTRICULAR RATE: 104 BPM
EOSINOPHILS ABSOLUTE: 0.1 K/UL (ref 0–0.7)
EOSINOPHILS RELATIVE PERCENT: 2 %
EPITHELIAL CELLS, UA: ABNORMAL /HPF
GFR AFRICAN AMERICAN: >60
GFR NON-AFRICAN AMERICAN: >60
GLOBULIN: 2.7 G/DL (ref 2.3–3.5)
GLUCOSE BLD-MCNC: 108 MG/DL (ref 70–99)
GLUCOSE BLD-MCNC: 168 MG/DL (ref 60–115)
GLUCOSE URINE: NEGATIVE MG/DL
HBA1C MFR BLD: 8.4 % (ref 4.8–5.9)
HCT VFR BLD CALC: 37.6 % (ref 37–47)
HEMOGLOBIN: 12.3 G/DL (ref 12–16)
INR BLD: 1.2
KETONES, URINE: NEGATIVE MG/DL
LEUKOCYTE ESTERASE, URINE: NEGATIVE
LYMPHOCYTES ABSOLUTE: 1.5 K/UL (ref 1–4.8)
LYMPHOCYTES RELATIVE PERCENT: 23.2 %
MAGNESIUM: 1.7 MG/DL (ref 1.7–2.4)
MCH RBC QN AUTO: 30 PG (ref 27–31.3)
MCHC RBC AUTO-ENTMCNC: 32.7 % (ref 33–37)
MCV RBC AUTO: 91.9 FL (ref 82–100)
MONOCYTES ABSOLUTE: 1 K/UL (ref 0.2–0.8)
MONOCYTES RELATIVE PERCENT: 14.3 %
NEUTROPHILS ABSOLUTE: 4 K/UL (ref 1.4–6.5)
NEUTROPHILS RELATIVE PERCENT: 59.9 %
NITRITE, URINE: NEGATIVE
PDW BLD-RTO: 15.1 % (ref 11.5–14.5)
PERFORMED ON: ABNORMAL
PH UA: 6 (ref 5–9)
PLATELET # BLD: 205 K/UL (ref 130–400)
POTASSIUM SERPL-SCNC: 3.5 MEQ/L (ref 3.4–4.9)
PRO-BNP: 4981 PG/ML
PROTEIN UA: 30 MG/DL
PROTHROMBIN TIME: 14.8 SEC (ref 12.3–14.9)
RBC # BLD: 4.09 M/UL (ref 4.2–5.4)
SODIUM BLD-SCNC: 135 MEQ/L (ref 135–144)
SPECIFIC GRAVITY UA: 1.01 (ref 1–1.03)
TOTAL PROTEIN: 6.4 G/DL (ref 6.3–8)
TROPONIN: <0.01 NG/ML (ref 0–0.01)
URINE REFLEX TO CULTURE: ABNORMAL
UROBILINOGEN, URINE: 0.2 E.U./DL
WBC # BLD: 6.7 K/UL (ref 4.8–10.8)
WBC UA: ABNORMAL /HPF (ref 0–5)

## 2020-12-07 PROCEDURE — G0378 HOSPITAL OBSERVATION PER HR: HCPCS

## 2020-12-07 PROCEDURE — 93005 ELECTROCARDIOGRAM TRACING: CPT

## 2020-12-07 PROCEDURE — 2580000003 HC RX 258: Performed by: EMERGENCY MEDICINE

## 2020-12-07 PROCEDURE — 83735 ASSAY OF MAGNESIUM: CPT

## 2020-12-07 PROCEDURE — 80053 COMPREHEN METABOLIC PANEL: CPT

## 2020-12-07 PROCEDURE — 83036 HEMOGLOBIN GLYCOSYLATED A1C: CPT

## 2020-12-07 PROCEDURE — 6360000002 HC RX W HCPCS: Performed by: EMERGENCY MEDICINE

## 2020-12-07 PROCEDURE — 96374 THER/PROPH/DIAG INJ IV PUSH: CPT

## 2020-12-07 PROCEDURE — 99284 EMERGENCY DEPT VISIT MOD MDM: CPT

## 2020-12-07 PROCEDURE — 93010 ELECTROCARDIOGRAM REPORT: CPT | Performed by: INTERNAL MEDICINE

## 2020-12-07 PROCEDURE — 1210000000 HC MED SURG R&B

## 2020-12-07 PROCEDURE — 6370000000 HC RX 637 (ALT 250 FOR IP): Performed by: INTERNAL MEDICINE

## 2020-12-07 PROCEDURE — 85610 PROTHROMBIN TIME: CPT

## 2020-12-07 PROCEDURE — 96376 TX/PRO/DX INJ SAME DRUG ADON: CPT

## 2020-12-07 PROCEDURE — 83880 ASSAY OF NATRIURETIC PEPTIDE: CPT

## 2020-12-07 PROCEDURE — 81001 URINALYSIS AUTO W/SCOPE: CPT

## 2020-12-07 PROCEDURE — 96372 THER/PROPH/DIAG INJ SC/IM: CPT

## 2020-12-07 PROCEDURE — 6360000002 HC RX W HCPCS: Performed by: INTERNAL MEDICINE

## 2020-12-07 PROCEDURE — 71045 X-RAY EXAM CHEST 1 VIEW: CPT

## 2020-12-07 PROCEDURE — 84484 ASSAY OF TROPONIN QUANT: CPT

## 2020-12-07 PROCEDURE — 36415 COLL VENOUS BLD VENIPUNCTURE: CPT

## 2020-12-07 PROCEDURE — 2580000003 HC RX 258: Performed by: INTERNAL MEDICINE

## 2020-12-07 PROCEDURE — 85025 COMPLETE CBC W/AUTO DIFF WBC: CPT

## 2020-12-07 PROCEDURE — 93970 EXTREMITY STUDY: CPT

## 2020-12-07 RX ORDER — FUROSEMIDE 20 MG/1
20 TABLET ORAL DAILY
Status: ON HOLD | COMMUNITY
Start: 2016-11-07 | End: 2020-12-07

## 2020-12-07 RX ORDER — SULFASALAZINE 500 MG/1
1500 TABLET ORAL 2 TIMES DAILY
COMMUNITY

## 2020-12-07 RX ORDER — HYDROCHLOROTHIAZIDE 25 MG/1
25 TABLET ORAL DAILY
Status: DISCONTINUED | OUTPATIENT
Start: 2020-12-07 | End: 2020-12-10 | Stop reason: HOSPADM

## 2020-12-07 RX ORDER — INSULIN GLARGINE 100 [IU]/ML
INJECTION, SOLUTION SUBCUTANEOUS
Status: ON HOLD | COMMUNITY
Start: 2017-01-09 | End: 2020-12-07

## 2020-12-07 RX ORDER — SODIUM CHLORIDE 0.9 % (FLUSH) 0.9 %
3 SYRINGE (ML) INJECTION EVERY 8 HOURS
Status: DISCONTINUED | OUTPATIENT
Start: 2020-12-07 | End: 2020-12-10 | Stop reason: HOSPADM

## 2020-12-07 RX ORDER — FUROSEMIDE 10 MG/ML
20 INJECTION INTRAMUSCULAR; INTRAVENOUS DAILY
Status: DISCONTINUED | OUTPATIENT
Start: 2020-12-07 | End: 2020-12-09

## 2020-12-07 RX ORDER — ACETAMINOPHEN 325 MG/1
650 TABLET ORAL EVERY 6 HOURS PRN
Status: DISCONTINUED | OUTPATIENT
Start: 2020-12-07 | End: 2020-12-10 | Stop reason: HOSPADM

## 2020-12-07 RX ORDER — SODIUM CHLORIDE 0.9 % (FLUSH) 0.9 %
10 SYRINGE (ML) INJECTION EVERY 12 HOURS SCHEDULED
Status: DISCONTINUED | OUTPATIENT
Start: 2020-12-07 | End: 2020-12-10 | Stop reason: HOSPADM

## 2020-12-07 RX ORDER — CARVEDILOL 25 MG/1
37.5 TABLET ORAL 2 TIMES DAILY WITH MEALS
Status: ON HOLD | COMMUNITY
Start: 2020-06-29 | End: 2021-03-22 | Stop reason: HOSPADM

## 2020-12-07 RX ORDER — NIACIN 1000 MG
2000 TABLET, EXTENDED RELEASE ORAL
Status: ON HOLD | COMMUNITY
Start: 2017-02-23 | End: 2020-12-07

## 2020-12-07 RX ORDER — CARVEDILOL 25 MG/1
25 TABLET ORAL 2 TIMES DAILY WITH MEALS
Status: DISCONTINUED | OUTPATIENT
Start: 2020-12-07 | End: 2020-12-10 | Stop reason: HOSPADM

## 2020-12-07 RX ORDER — ACETAMINOPHEN 650 MG/1
650 SUPPOSITORY RECTAL EVERY 6 HOURS PRN
Status: DISCONTINUED | OUTPATIENT
Start: 2020-12-07 | End: 2020-12-10 | Stop reason: HOSPADM

## 2020-12-07 RX ORDER — PROMETHAZINE HYDROCHLORIDE 12.5 MG/1
12.5 TABLET ORAL EVERY 6 HOURS PRN
Status: DISCONTINUED | OUTPATIENT
Start: 2020-12-07 | End: 2020-12-10 | Stop reason: HOSPADM

## 2020-12-07 RX ORDER — POTASSIUM CHLORIDE 750 MG/1
10 TABLET, EXTENDED RELEASE ORAL 2 TIMES DAILY
Status: DISCONTINUED | OUTPATIENT
Start: 2020-12-07 | End: 2020-12-09

## 2020-12-07 RX ORDER — ASPIRIN 81 MG/1
81 TABLET ORAL DAILY
Status: DISCONTINUED | OUTPATIENT
Start: 2020-12-07 | End: 2020-12-10 | Stop reason: HOSPADM

## 2020-12-07 RX ORDER — POLYETHYLENE GLYCOL 3350 17 G/17G
17 POWDER, FOR SOLUTION ORAL DAILY PRN
Status: DISCONTINUED | OUTPATIENT
Start: 2020-12-07 | End: 2020-12-10 | Stop reason: HOSPADM

## 2020-12-07 RX ORDER — ASPIRIN 81 MG/1
TABLET ORAL
Status: ON HOLD | COMMUNITY
Start: 2011-03-16 | End: 2020-12-07

## 2020-12-07 RX ORDER — ALBUTEROL SULFATE 90 UG/1
AEROSOL, METERED RESPIRATORY (INHALATION)
COMMUNITY
Start: 2015-10-19

## 2020-12-07 RX ORDER — SODIUM CHLORIDE 0.9 % (FLUSH) 0.9 %
10 SYRINGE (ML) INJECTION PRN
Status: DISCONTINUED | OUTPATIENT
Start: 2020-12-07 | End: 2020-12-10 | Stop reason: HOSPADM

## 2020-12-07 RX ORDER — POTASSIUM CHLORIDE 750 MG/1
CAPSULE, EXTENDED RELEASE ORAL
Status: ON HOLD | COMMUNITY
Start: 2019-06-24 | End: 2020-12-07

## 2020-12-07 RX ORDER — LOSARTAN POTASSIUM AND HYDROCHLOROTHIAZIDE 25; 100 MG/1; MG/1
1 TABLET ORAL DAILY
Status: ON HOLD | COMMUNITY
Start: 2016-11-14 | End: 2020-12-07

## 2020-12-07 RX ORDER — FUROSEMIDE 10 MG/ML
20 INJECTION INTRAMUSCULAR; INTRAVENOUS ONCE
Status: COMPLETED | OUTPATIENT
Start: 2020-12-07 | End: 2020-12-07

## 2020-12-07 RX ORDER — ALBUTEROL SULFATE 2.5 MG/3ML
2.5 SOLUTION RESPIRATORY (INHALATION) EVERY 4 HOURS PRN
Status: DISCONTINUED | OUTPATIENT
Start: 2020-12-07 | End: 2020-12-10 | Stop reason: HOSPADM

## 2020-12-07 RX ORDER — ACETAMINOPHEN 500 MG
TABLET ORAL
Status: ON HOLD | COMMUNITY
End: 2020-12-07

## 2020-12-07 RX ORDER — AMMONIUM LACTATE 12 G/100G
LOTION TOPICAL PRN
Status: ON HOLD | COMMUNITY
Start: 2016-08-08 | End: 2022-05-09 | Stop reason: HOSPADM

## 2020-12-07 RX ORDER — ONDANSETRON 2 MG/ML
4 INJECTION INTRAMUSCULAR; INTRAVENOUS EVERY 6 HOURS PRN
Status: DISCONTINUED | OUTPATIENT
Start: 2020-12-07 | End: 2020-12-10 | Stop reason: HOSPADM

## 2020-12-07 RX ORDER — LOSARTAN POTASSIUM AND HYDROCHLOROTHIAZIDE 25; 100 MG/1; MG/1
1 TABLET ORAL DAILY
Status: DISCONTINUED | OUTPATIENT
Start: 2020-12-07 | End: 2020-12-07 | Stop reason: SDUPTHER

## 2020-12-07 RX ORDER — INSULIN GLARGINE 100 [IU]/ML
22 INJECTION, SOLUTION SUBCUTANEOUS NIGHTLY
Status: DISCONTINUED | OUTPATIENT
Start: 2020-12-07 | End: 2020-12-10 | Stop reason: HOSPADM

## 2020-12-07 RX ADMIN — ENOXAPARIN SODIUM 120 MG: 120 INJECTION SUBCUTANEOUS at 17:12

## 2020-12-07 RX ADMIN — INSULIN GLARGINE 22 UNITS: 100 INJECTION, SOLUTION SUBCUTANEOUS at 20:58

## 2020-12-07 RX ADMIN — ACETAMINOPHEN 650 MG: 325 TABLET ORAL at 20:57

## 2020-12-07 RX ADMIN — POTASSIUM CHLORIDE 10 MEQ: 750 TABLET, EXTENDED RELEASE ORAL at 20:58

## 2020-12-07 RX ADMIN — Medication 3 ML: at 21:03

## 2020-12-07 RX ADMIN — FUROSEMIDE 20 MG: 10 INJECTION, SOLUTION INTRAMUSCULAR; INTRAVENOUS at 15:30

## 2020-12-07 RX ADMIN — Medication 3 ML: at 15:32

## 2020-12-07 RX ADMIN — CARVEDILOL 25 MG: 25 TABLET, FILM COATED ORAL at 20:57

## 2020-12-07 RX ADMIN — FUROSEMIDE 20 MG: 10 INJECTION, SOLUTION INTRAMUSCULAR; INTRAVENOUS at 20:58

## 2020-12-07 RX ADMIN — Medication 10 ML: at 21:03

## 2020-12-07 RX ADMIN — ASPIRIN 81 MG: 81 TABLET, COATED ORAL at 20:57

## 2020-12-07 RX ADMIN — HYDROCHLOROTHIAZIDE 25 MG: 25 TABLET ORAL at 20:57

## 2020-12-07 ASSESSMENT — ENCOUNTER SYMPTOMS
SHORTNESS OF BREATH: 1
ABDOMINAL PAIN: 0
EYE REDNESS: 0
VOICE CHANGE: 0
CHEST TIGHTNESS: 0
CONSTIPATION: 1
BLOOD IN STOOL: 0
TROUBLE SWALLOWING: 0
VOMITING: 0
BACK PAIN: 0
SORE THROAT: 0
COUGH: 0
DIARRHEA: 0
FACIAL SWELLING: 0
EYE DISCHARGE: 0
WHEEZING: 0
EYE PAIN: 0
SINUS PRESSURE: 0
CHOKING: 0
STRIDOR: 0

## 2020-12-07 ASSESSMENT — PAIN DESCRIPTION - DESCRIPTORS: DESCRIPTORS: DISCOMFORT

## 2020-12-07 ASSESSMENT — PAIN SCALES - GENERAL: PAINLEVEL_OUTOF10: 4

## 2020-12-07 ASSESSMENT — PAIN DESCRIPTION - LOCATION: LOCATION: SHOULDER

## 2020-12-07 ASSESSMENT — PAIN DESCRIPTION - ORIENTATION: ORIENTATION: RIGHT

## 2020-12-07 NOTE — ED PROVIDER NOTES
[facility] ED  eMERGENCY dEPARTMENT eNCOUnter      Pt Name: Nir Aguillon  MRN: [id number]  [place] 1945  Date of evaluation: 12/7/2020  Provider: Shabnam Scuhlz MD    [address] [place] [address]       Chief Complaint   Patient presents with    Shortness of Breath     Worse on exertion.  Leg Swelling    Constipation         HISTORY OF PRESENT ILLNESS   (Location/Symptom, Timing/Onset,Context/Setting, Quality, Duration, Modifying Factors, Severity)  Note limiting factors. Nir Aguillon is a 77 y.o. female who presents to the emergency department patient well-known to me from previous encounters to this emergency in the month of August when she presented with small bowel obstruction in transfer to the clinic system and underwent surgical intervention for her small bowel obstruction patient has significant history of bladder cancer breast cancer status postmastectomy status post bladder surgery and radiation treatment and chemo going on at this time patient status post hysterectomy history of hypertension diabetic increased cholesterol chronic swelling and lymphedema both legs increasing swelling short of breath on exertion for the past few days but he also having some pain to the thighs thinking that she may be getting blood clots no fever no contact with [place] patient lives with her sister here for further evaluation    HPI    NursingNotes were reviewed. REVIEW OF SYSTEMS    (2-9 systems for level 4, 10 or more for level 5)     Review of Systems   Constitutional: Positive for activity change. Negative for fever. HENT: Negative for congestion, drooling, facial swelling, mouth sores, nosebleeds, sinus pressure, sore throat, trouble swallowing and voice change. Eyes: Negative for pain, discharge, redness and visual disturbance. Respiratory: Positive for shortness of breath. Negative for cough, choking, chest tightness, wheezing and stridor. Cardiovascular: Positive for leg swelling.  Negative for chest pain and palpitations. Gastrointestinal: Positive for constipation. Negative for abdominal pain, blood in stool, diarrhea and vomiting. Endocrine: Negative for cold intolerance, polyphagia and polyuria. Genitourinary: Negative for dysuria, flank pain, frequency, genital sores and urgency. Musculoskeletal: Negative for back pain, joint swelling, neck pain and neck stiffness. Skin: Negative for pallor and rash. Neurological: Negative for tremors, seizures, syncope, weakness, numbness and headaches. Hematological: Negative for adenopathy. Does not bruise/bleed easily. Psychiatric/Behavioral: Negative for agitation, behavioral problems, hallucinations and sleep disturbance. The patient is not hyperactive. All other systems reviewed and are negative. Except as noted above the remainder of the review of systems was reviewed and negative.        PAST MEDICAL HISTORY     Past Medical History:   Diagnosis Date    Cancer [facility])     bladder, breast,     Hyperlipidemia     Hypertension     Osteoarthritis     Type II or unspecified type diabetes mellitus without mention of complication, not stated as uncontrolled          SURGICALHISTORY       Past Surgical History:   Procedure Laterality Date    BLADDER SURGERY      cancer    BREAST SURGERY      HERNIA REPAIR      HYSTERECTOMY           CURRENT MEDICATIONS       Previous Medications    ACETAMINOPHEN (TYLENOL) 500 MG TABLET    Take 500 mg by mouth every 6 hours as needed for Pain    ACETAMINOPHEN (TYLENOL) 500 MG TABLET    Take by mouth    ALBUTEROL SULFATE HFA (PROAIR HFA) 108 (90 BASE) MCG/ACT INHALER    1 puff by Other route    ALBUTEROL SULFATE HFA (PROAIR HFA) 108 (90 BASE) MCG/ACT INHALER    USE 1 INHALATION EVERY 6 HOURS AS NEEDED FOR WHEEZING OR SHORTNESS OF BREATH AS DIRECTED    AMMONIUM LACTATE (LAC-HYDRIN) 12 % LOTION    apply to dry area on feet/legs twice a day    AMMONIUM LACTATE (LAC-HYDRIN) 12 % LOTION    Apply topically daily.    AMMONIUM LACTATE (LAC-HYDRIN) 12 % LOTION    Apply topically as needed    ASPIRIN (ECOTRIN LOW STRENGTH) 81 MG EC TABLET    Take 81 mg by mouth    ASPIRIN 81 MG EC TABLET    Take by mouth    CARVEDILOL (COREG) 25 MG TABLET    Take 25 mg by mouth    CARVEDILOL (COREG) 25 MG TABLET    Take 25 mg by mouth 2 times daily (with meals)    FUROSEMIDE (LASIX) 20 MG TABLET    TAKE 2 TABLETS EVERY MORNING AND 1 TABLET EVERY EVENING    FUROSEMIDE (LASIX) 20 MG TABLET    Take 20 mg by mouth daily    HYDROCHLOROTHIAZIDE (HYDRODIURIL) 25 MG TABLET    Take 25 mg by mouth daily    INSULIN GLARGINE (LANTUS SOLOSTAR) 100 UNIT/ML INJECTION PEN    INJECT 22 UNITS AT BEDTIME    INSULIN GLARGINE (LANTUS SOLOSTAR) 100 UNIT/ML INJECTION PEN    INJECT 22 UNITS AT BEDTIME    INSULIN LISPRO (HUMALOG KWIKPEN) 100 UNIT/ML PEN    Inject 6 to 8 units before breakfast, 8 units with lunch and 12 units at dinner    INSULIN LISPRO, 0.5 UNIT DIAL, 100 UNIT/ML SOPN    Insulin Lispro insulin lispro (HUMALOG KWIKPEN) 100 UNIT/ML pen Inject 6 to 8 units before breakfast, 8 units with lunch and 12 units at dinner 0 03/05/2016 Active 03- Historical Provider Emely Ordonez ([id number])    LOSARTAN (COZAAR) 100 MG TABLET    Take 100 mg by mouth daily    LOSARTAN-HYDROCHLOROTHIAZIDE (HYZAAR) 100-25 MG PER TABLET    Take 1 tablet by mouth    LOSARTAN-HYDROCHLOROTHIAZIDE (HYZAAR) 100-25 MG PER TABLET    Take 1 tablet by mouth daily    NIACIN ER 1000 MG TBCR    Take 2,000 mg by mouth    NIACIN ER 1000 MG TBCR    Take 2,000 mg by mouth    POTASSIUM CHLORIDE (MICRO-K) 10 MEQ EXTENDED RELEASE CAPSULE    TAKE 1 TABLET TWICE A DAY    POTASSIUM CHLORIDE (MICRO-K) 10 MEQ EXTENDED RELEASE CAPSULE    Potassium Chloride potassium chloride (MICRO-K) 10 MEQ extended release capsule TAKE 1 TABLET TWICE A DAY 0 06/24/2019 Active 06- Historical Provider Emely Ordonez ([id number])    POTASSIUM CHLORIDE CLARA ER (K-DUR PO)    Take 10 mg by mouth daily well-developed. She is obese. She is not ill-appearing or toxic-appearing. Comments: Sophie Howard cooperative patient afebrile nontoxic appearance talks in full sentences   HENT:      Head: Normocephalic and atraumatic. Mouth/Throat:      Mouth: Mucous membranes are moist.      Pharynx: No pharyngeal swelling or oropharyngeal exudate. Eyes:      Extraocular Movements: Extraocular movements intact. Neck:      Musculoskeletal: Normal range of motion and neck supple. Thyroid: No thyromegaly. Vascular: No hepatojugular reflux. Trachea: No tracheal deviation. Cardiovascular:      Rate and Rhythm: Normal rate and regular rhythm. Heart sounds: Normal heart sounds. Pulmonary:      Effort: Pulmonary effort is normal.      Breath sounds: Normal breath sounds. No decreased breath sounds, wheezing or rhonchi. Chest:      Chest wall: No mass, tenderness, crepitus or edema. There is no dullness to percussion. Abdominal:      Palpations: Abdomen is soft. There is no hepatomegaly, splenomegaly or mass. Tenderness: There is no abdominal tenderness. Musculoskeletal: Normal range of motion. Right lower leg: She exhibits tenderness. Edema present. Left lower leg: She exhibits tenderness. Edema present. Comments: Patient with both legs patient has chronic swelling of the both legs patient has a crocodile skin hard indurated has diffuse tenderness to the left upper thigh no cellulitis noted cap refill less than 2 seconds on the distal toes   Skin:     General: Skin is warm. Capillary Refill: Capillary refill takes less than 2 seconds. Coloration: Skin is not cyanotic or pale. Findings: No ecchymosis, erythema or rash. Neurological:      General: No focal deficit present. Mental Status: She is oriented to person, place, and time. Cranial Nerves: No cranial nerve deficit. Motor: No weakness.    Psychiatric:         Mood and Affect: Mood normal. (1.524 m)           MDM  Number of Diagnoses or Management Options  Acute deep vein thrombosis (DVT) of distal vein of both lower extremities ([address]):   Acute on chronic congestive heart failure, unspecified heart failure type [facility]): established and improving  Peripheral edema:   Diagnosis management comments: Patient with multiple medical issues obesity hypertension high cholesterol diabetes increased swelling of the legs along with pain and short of breath on exertion for the past several days water here EKG performed sinus tachycardia rate of 104/min patient had left axis deviation left hypertrophy AK interval 172 ms QRS duration 1 1 6 ms QT interval 382 ms has no ischemia no ST elevation on EKG, per patient she is not very compliant with her Lasix therapy at home and has been taking occasionally not routinely patient given Lasix and started making good urine start feeling much better at this time no pneumonia finding consistent with acute aggravation of chronic CHF as well as patient has a positive DVT in the both legs Case discussed with the hospitalist patient given Lovenox at this time       Amount and/or Complexity of Data Reviewed  Clinical lab tests: reviewed and ordered  Tests in the radiology section of CPT®: ordered and reviewed        CRITICAL CARE TIME   Total Critical Care time was  minutes, excluding separately reportableprocedures. There was a high probability of clinicallysignificant/life threatening deterioration in the patient's condition which required my urgent intervention. ONSULTS:  IP CONSULT TO HOSPITALIST    PROCEDURES:  Unless otherwise noted below, none     Procedures    FINAL IMPRESSION      1. Acute deep vein thrombosis (DVT) of distal vein of both lower extremities (HCC)    2. Peripheral edema    3.  Acute on chronic congestive heart failure, unspecified heart failure type ([facility] [address]          DISPOSITION/PLAN   DISPOSITION        PATIENT REFERRED TO:  No follow-up provider specified.     DISCHARGE MEDICATIONS:  New Prescriptions    No medications on file          (Please note that portions of this note were completed with a voice recognition program.  Efforts were made to edit the dictations but occasionally words are mis-transcribed.)    Rolan Lackey MD (electronically signed)  Attending Emergency Physician       Rolan Lackey MD  12/07/20 2936

## 2020-12-08 LAB
ANION GAP SERPL CALCULATED.3IONS-SCNC: 11 MEQ/L (ref 9–15)
BUN BLDV-MCNC: 19 MG/DL (ref 8–23)
CALCIUM SERPL-MCNC: 8.9 MG/DL (ref 8.5–9.9)
CHLORIDE BLD-SCNC: 98 MEQ/L (ref 95–107)
CO2: 29 MEQ/L (ref 20–31)
CREAT SERPL-MCNC: 0.68 MG/DL (ref 0.5–0.9)
GFR AFRICAN AMERICAN: >60
GFR NON-AFRICAN AMERICAN: >60
GLUCOSE BLD-MCNC: 128 MG/DL (ref 60–115)
GLUCOSE BLD-MCNC: 130 MG/DL (ref 70–99)
GLUCOSE BLD-MCNC: 136 MG/DL (ref 60–115)
GLUCOSE BLD-MCNC: 145 MG/DL (ref 60–115)
GLUCOSE BLD-MCNC: 211 MG/DL (ref 60–115)
HCT VFR BLD CALC: 35.5 % (ref 37–47)
HEMOGLOBIN: 11.5 G/DL (ref 12–16)
LV EF: 23 %
LVEF MODALITY: NORMAL
MAGNESIUM: 1.8 MG/DL (ref 1.7–2.4)
MCH RBC QN AUTO: 29.9 PG (ref 27–31.3)
MCHC RBC AUTO-ENTMCNC: 32.5 % (ref 33–37)
MCV RBC AUTO: 91.9 FL (ref 82–100)
PDW BLD-RTO: 15.5 % (ref 11.5–14.5)
PERFORMED ON: ABNORMAL
PLATELET # BLD: 179 K/UL (ref 130–400)
POTASSIUM REFLEX MAGNESIUM: 3.3 MEQ/L (ref 3.4–4.9)
RBC # BLD: 3.86 M/UL (ref 4.2–5.4)
SODIUM BLD-SCNC: 138 MEQ/L (ref 135–144)
WBC # BLD: 4.5 K/UL (ref 4.8–10.8)

## 2020-12-08 PROCEDURE — G0378 HOSPITAL OBSERVATION PER HR: HCPCS

## 2020-12-08 PROCEDURE — 97530 THERAPEUTIC ACTIVITIES: CPT

## 2020-12-08 PROCEDURE — 2500000003 HC RX 250 WO HCPCS: Performed by: INTERNAL MEDICINE

## 2020-12-08 PROCEDURE — 2580000003 HC RX 258: Performed by: INTERNAL MEDICINE

## 2020-12-08 PROCEDURE — 97162 PT EVAL MOD COMPLEX 30 MIN: CPT

## 2020-12-08 PROCEDURE — 1210000000 HC MED SURG R&B

## 2020-12-08 PROCEDURE — 97116 GAIT TRAINING THERAPY: CPT

## 2020-12-08 PROCEDURE — 85027 COMPLETE CBC AUTOMATED: CPT

## 2020-12-08 PROCEDURE — 2580000003 HC RX 258: Performed by: EMERGENCY MEDICINE

## 2020-12-08 PROCEDURE — 6360000004 HC RX CONTRAST MEDICATION: Performed by: INTERNAL MEDICINE

## 2020-12-08 PROCEDURE — 6370000000 HC RX 637 (ALT 250 FOR IP): Performed by: INTERNAL MEDICINE

## 2020-12-08 PROCEDURE — 83735 ASSAY OF MAGNESIUM: CPT

## 2020-12-08 PROCEDURE — C8929 TTE W OR WO FOL WCON,DOPPLER: HCPCS

## 2020-12-08 PROCEDURE — 6360000002 HC RX W HCPCS: Performed by: INTERNAL MEDICINE

## 2020-12-08 PROCEDURE — 36415 COLL VENOUS BLD VENIPUNCTURE: CPT

## 2020-12-08 PROCEDURE — 96376 TX/PRO/DX INJ SAME DRUG ADON: CPT

## 2020-12-08 PROCEDURE — 96372 THER/PROPH/DIAG INJ SC/IM: CPT

## 2020-12-08 PROCEDURE — 97166 OT EVAL MOD COMPLEX 45 MIN: CPT

## 2020-12-08 PROCEDURE — 80048 BASIC METABOLIC PNL TOTAL CA: CPT

## 2020-12-08 RX ORDER — DEXTROSE MONOHYDRATE 50 MG/ML
100 INJECTION, SOLUTION INTRAVENOUS PRN
Status: DISCONTINUED | OUTPATIENT
Start: 2020-12-08 | End: 2020-12-10 | Stop reason: HOSPADM

## 2020-12-08 RX ORDER — POTASSIUM CHLORIDE 20 MEQ/1
40 TABLET, EXTENDED RELEASE ORAL ONCE
Status: COMPLETED | OUTPATIENT
Start: 2020-12-08 | End: 2020-12-08

## 2020-12-08 RX ORDER — NICOTINE POLACRILEX 4 MG
15 LOZENGE BUCCAL PRN
Status: DISCONTINUED | OUTPATIENT
Start: 2020-12-08 | End: 2020-12-10 | Stop reason: HOSPADM

## 2020-12-08 RX ORDER — DEXTROSE MONOHYDRATE 25 G/50ML
12.5 INJECTION, SOLUTION INTRAVENOUS PRN
Status: DISCONTINUED | OUTPATIENT
Start: 2020-12-08 | End: 2020-12-10 | Stop reason: HOSPADM

## 2020-12-08 RX ADMIN — ENOXAPARIN SODIUM 120 MG: 120 INJECTION SUBCUTANEOUS at 19:53

## 2020-12-08 RX ADMIN — Medication 3 ML: at 15:46

## 2020-12-08 RX ADMIN — ENOXAPARIN SODIUM 120 MG: 120 INJECTION SUBCUTANEOUS at 09:33

## 2020-12-08 RX ADMIN — FUROSEMIDE 20 MG: 10 INJECTION, SOLUTION INTRAMUSCULAR; INTRAVENOUS at 09:38

## 2020-12-08 RX ADMIN — POTASSIUM CHLORIDE 10 MEQ: 750 TABLET, EXTENDED RELEASE ORAL at 09:30

## 2020-12-08 RX ADMIN — MICONAZOLE NITRATE: 20 POWDER TOPICAL at 19:53

## 2020-12-08 RX ADMIN — INSULIN GLARGINE 22 UNITS: 100 INJECTION, SOLUTION SUBCUTANEOUS at 19:54

## 2020-12-08 RX ADMIN — ASPIRIN 81 MG: 81 TABLET, COATED ORAL at 09:32

## 2020-12-08 RX ADMIN — MAGNESIUM CITRATE 150 ML: 1.75 LIQUID ORAL at 15:18

## 2020-12-08 RX ADMIN — PERFLUTREN 2.2 MG: 6.52 INJECTION, SUSPENSION INTRAVENOUS at 14:51

## 2020-12-08 RX ADMIN — POTASSIUM CHLORIDE 40 MEQ: 1500 TABLET, EXTENDED RELEASE ORAL at 09:30

## 2020-12-08 RX ADMIN — CARVEDILOL 25 MG: 25 TABLET, FILM COATED ORAL at 17:51

## 2020-12-08 RX ADMIN — POTASSIUM CHLORIDE 10 MEQ: 750 TABLET, EXTENDED RELEASE ORAL at 19:53

## 2020-12-08 RX ADMIN — Medication 10 ML: at 09:34

## 2020-12-08 RX ADMIN — GLYCERIN 2 G: 2 SUPPOSITORY RECTAL at 15:26

## 2020-12-08 ASSESSMENT — PAIN SCALES - GENERAL
PAINLEVEL_OUTOF10: 0
PAINLEVEL_OUTOF10: 0

## 2020-12-08 NOTE — H&P
Take 81 mg by mouth 3/16/11  Yes Historical Provider, MD   furosemide (LASIX) 20 MG tablet TAKE 2 TABLETS EVERY MORNING AND 1 TABLET EVERY EVENING 11/7/16  Yes Historical Provider, MD   insulin glargine (LANTUS SOLOSTAR) 100 UNIT/ML injection pen INJECT 22 UNITS AT BEDTIME 1/9/17  Yes Historical Provider, MD   insulin lispro (HUMALOG KWIKPEN) 100 UNIT/ML pen Inject 6 to 8 units before breakfast, 8 units with lunch and 12 units at dinner 3/5/16  Yes Historical Provider, MD   losartan-hydrochlorothiazide (HYZAAR) 100-25 MG per tablet Take 1 tablet by mouth 11/14/16  Yes Historical Provider, MD       Allergies:  Rocephin [ceftriaxone sodium]; Clindamycin; Clindamycin/lincomycin; Minocycline; Tetracyclines & related; and Ceftriaxone    Social History:      The patient currently lives home    TOBACCO:   reports that she has quit smoking. She has never used smokeless tobacco.  ETOH:   reports no history of alcohol use. Family History:       Reviewed in detail and negative for DM, CAD, Cancer, CVA. History reviewed. No pertinent family history. REVIEW OF SYSTEMS:   Pertinent positives as noted in the HPI. All other systems reviewed and negative. PHYSICAL EXAM:    BP (!) 103/54   Pulse 77   Temp 97.9 °F (36.6 °C) (Oral)   Resp 18   Ht 5' (1.524 m)   Wt 266 lb 12.8 oz (121 kg)   SpO2 93%   BMI 52.11 kg/m²     General appearance:  No apparent distress, appears stated age and cooperative. HEENT:  Normal cephalic, atraumatic without obvious deformity. Pupils equal, round, and reactive to light. Extra ocular muscles intact. Conjunctivae/corneas clear. Neck: Supple, with full range of motion. No jugular venous distention. Trachea midline. Respiratory:  Normal respiratory effort. Clear to auscultation, bilaterally without Rales/Wheezes/Rhonchi. Cardiovascular:  Regular rate and rhythm with normal S1/S2 without murmurs, rubs or gallops.   Abdomen: Soft, non-tender, non-distended with normal bowel sounds. Musculoskeletal:   edema bilaterally. Skin: bilateral lower legs chronic skin changes/minimal redness   Neurologic:  Neurovascularly intact without any focal sensory/motor deficits. Cranial nerves: II-XII intact, grossly non-focal.  Psychiatric:  Alert and oriented, thought content appropriate, normal insight      Labs:     Recent Labs     12/07/20  1526 12/08/20  0810   WBC 6.7 4.5*   HGB 12.3 11.5*   HCT 37.6 35.5*    179     Recent Labs     12/07/20  1526 12/08/20  0810    138   K 3.5 3.3*   CL 95 98   CO2 28 29   BUN 19 19   CREATININE 0.62 0.68   CALCIUM 9.3 8.9     Recent Labs     12/07/20  1526   AST 11   ALT 9   BILITOT 0.6   ALKPHOS 130     Recent Labs     12/07/20  1526   INR 1.2     Recent Labs     12/07/20  1526   TROPONINI <0.010       Urinalysis:      Lab Results   Component Value Date    NITRU Negative 12/07/2020    WBCUA 0-2 12/07/2020    BACTERIA RARE 12/07/2020    RBCUA NEG 02/21/2012    BLOODU Trace-intact 12/07/2020    SPECGRAV 1.015 12/07/2020    GLUCOSEU Negative 12/07/2020    GLUCOSEU NEG 02/21/2012           US DUP LOWER EXTREMITIES BILATERAL VENOUS   Final Result   1. Thrombus is seen bilaterally at the junction of the common femoral and greater saphenous veins. It is nonobstructing. 2. Bilateral thrombophlebitis      XR CHEST PORTABLE   Final Result   No radiographic evidence of acute intrathoracic process. ASSESSMENT:    Active Hospital Problems    Diagnosis Date Noted    DVT, lower extremity, distal, acute, bilateral ([facility] [address] [I82.4Z3] 12/07/2020       PLAN:        DVT Prophylaxis: lovenox  Diet: DIET GENERAL;  Code Status: Full Code    PT/OT Eval Status:     Dispo - Bilateral DVT, non occlusive- full anticoagulation initiated  Legs pain/edema/redness- improved with current Tx  CHF?  Work up in progress, continue with IV lasix another day, 2 d echo pending  HTN- controlled  Chronic constipation, history of SBO- post operative Tx- add mag citrate, follow up clinically  cronic bilateral legs skin changes/lymphedema-better with lasix  Morbid obesity with BMI 52%- supportive care  Medically stable for acute admission at [facility], MD    Thank you Walker Batista MD for the opportunity to be involved in this patient's care. If you have any questions or concerns please feel free to contact me.

## 2020-12-08 NOTE — PROGRESS NOTES
Physical Therapy    Facility/Department: [facility] MED SURG UNIT  Initial Assessment - Med Surg eval     NAME: Génesis Bautista  : 1945  MRN: [id number]    Date of Service: 2020    Discharge Recommendations:  [address] with assist PRN, Home with Home health PT        Assessment   Body structures, Functions, Activity limitations: Decreased functional mobility ; Decreased endurance;Decreased strength;Decreased balance; Increased pain  Assessment: Pt is a  78 yo female who is admitted to [facility] due to DVTs B LEs. PT completed evaluation. Pt was able to perform bed mobility and transfers with CGA. Pt reports having arthritis of both of her knees which makes it difficult to get in/out of her bed and her sisters car. Pt was able to ambulate in the hallway but was fatigued after ambulating a short distance. PT recommends [facility] vs Home with [facility] as pt normally lives alone. Treatment Diagnosis: B LE DVTs  Prognosis: Good  Decision Making: Medium Complexity  REQUIRES PT FOLLOW UP: Yes       Patient Diagnosis(es): The primary encounter diagnosis was Acute deep vein thrombosis (DVT) of distal vein of both lower extremities ([address] Diagnoses of Peripheral edema and Acute on chronic congestive heart failure, unspecified heart failure type [facility]) were also pertinent to this visit. has a past medical history of Cancer ([address] Hyperlipidemia, Hypertension, Osteoarthritis, and Type II or unspecified type diabetes mellitus without mention of complication, not stated as uncontrolled. has a past surgical history that includes hernia repair; Hysterectomy; Breast surgery; and Bladder surgery.     Restrictions     Vision/Hearing        Subjective  General  Chart Reviewed: Yes  Patient assessed for rehabilitation services?: Yes  Additional Pertinent Hx: Pt admitted due to DVT B LE - Cleared with Dr Sarmad crowder for PT/OT to work with pt  Family / Caregiver Present: No  Referring Practitioner: Dr Sarmad Iverson  Referral Date : 12/08/20  Diagnosis: B LE DVT  Subjective  Subjective: Pt report no c/o of pain currently  Pain Screening  Patient Currently in Pain: Yes  Pain Assessment  Pain Assessment: 0-10  Pain Level: 0  Vital Signs  Patient Currently in Pain: Yes       Orientation     Social/Functional History  Social/Functional History  Lives With: Alone(Pt has been staying with her sister since Aug of 2020 since recooperating from bowel surgery)  Type of Home: House  Home Layout: One level  Home Access: Ramped entrance  Bathroom Shower/Tub: Walk-in shower, Shower chair with back  [employer]: Handicap height  Bathroom Equipment: Grab bars in shower, Grab bars around toilet  Bathroom Accessibility: Accessible  Home Equipment: Standard walker, Rolling walker, 4 wheeled walker, Lift chair, Cane, Reacher, Quad cane, Long-handled shoehorn, Sock aid, Leg , Wheelchair-manual(Pt has a Rollator at home, work and her sisters)  Receives Help From: Family(Sister and Brother in law)  ADL Assistance: [address] [place] [address] Avenue: Independent  Homemaking Responsibilities: Yes  Ambulation Assistance: Independent  Transfer Assistance: Independent  Active : Yes  Mode of Transportation: Car  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 [employer]        Objective          AROM RLE (degrees)  RLE AROM: WFL  AROM LLE (degrees)  LLE AROM : WFL  AROM RUE (degrees)  RUE AROM : WFL  AROM LUE (degrees)  LUE AROM : WFL  Strength RLE  Strength RLE: WFL  Strength LLE  Strength LLE: WFL  Strength RUE  Strength RUE: WFL  Strength LUE  Strength LUE: WFL        Bed mobility  Supine to Sit: Stand by assistance;Contact guard assistance  Transfers  Sit to Stand: Contact guard assistance  Stand to sit: Contact guard assistance  Ambulation  Ambulation?: Yes  Ambulation 1  Surface: level tile  Device: Rollator  Assistance: Contact guard assistance  Quality of Gait: Pt ambulated with decreased stance time B LE with a slow kylie  Gait Deviations: Slow Kylie;Decreased step length  Distance: 65'x 2  Comments: Pt was slightly SOB and fatigued after ambulating 65'x 2  Stairs/Curb  Stairs?: No              Plan   Plan  Times per week: 5-7 days  Times per day: Daily(QD to BID)  Plan weeks: Recommend [facility] or HHC with assistance PRN  Current Treatment Recommendations: Strengthening, Transfer Training, Endurance Training, Pain Management, Balance Training, Gait Training, Home Exercise Program, Functional Mobility Training    G-Code       OutComes Score                                                  AM-PAC Score             Goals  Short term goals  Time Frame for Short term goals: 1-4 days of Med Surg  Short term goal 1: Indep with bed mobility  Short term goal 2: Supervision with transfers  Short term goal 3: Pt able to ambulate with AD for >125'x 1 with a safe gait pattern  Short term goal 4: Pt able to tolerate 10 reps of ther ex  Short term goal 5: Pt able to be on her feet for > 5min before needing to rest  Long term goals  Time Frame for Long term goals : Same as STGs  Patient Goals   Patient goals :  To get stronger to return home       Therapy Time   Individual Concurrent Group Co-treatment   Time In  11;00am         Time Out  12:00pm         Minutes  60 min                  Sailaja Altamirano, VENKATA#[id number]

## 2020-12-08 NOTE — PROGRESS NOTES
Occupational Therapy   Occupational Therapy Initial Assessment- Med  Date: 2020   Patient Name: Christine Pemberton  MRN: [id number]     : 1945    Date of Service: 2020    Discharge Recommendations:  Continue to assess pending progress, Home with Home health OT, Subacute/Skilled Nursing Facility       Assessment   Performance deficits / Impairments: Decreased functional mobility ; Decreased ADL status; Decreased strength;Decreased endurance;Decreased balance;Decreased high-level IADLs  Assessment: Pt is a 78y/o female PLOF lives alone, was I/MI with ADL whom presents to [facility] 2* BLE DVT. Pt demo's the above resulting perf def/impair and would benefit from OT skilled services to maximize strength/end and safety/I with ADL for safe d/c transition. Prognosis: Good  Decision Making: Medium Complexity  History: multi comorb  Exam: 6 perf def/impair  OT Education: OT Role;Plan of Care  REQUIRES OT FOLLOW UP: Yes  Safety Devices  Safety Devices in place: Yes  Type of devices: All fall risk precautions in place           Patient Diagnosis(es): The primary encounter diagnosis was Acute deep vein thrombosis (DVT) of distal vein of both lower extremities ([facility] [address] Diagnoses of Peripheral edema and Acute on chronic congestive heart failure, unspecified heart failure type [facility]) were also pertinent to this visit. has a past medical history of Cancer ([facility] [address] Hyperlipidemia, Hypertension, Osteoarthritis, and Type II or unspecified type diabetes mellitus without mention of complication, not stated as uncontrolled. has a past surgical history that includes hernia repair; Hysterectomy; Breast surgery; and Bladder surgery.            Restrictions       Subjective   General  Chart Reviewed: Yes  Patient assessed for rehabilitation services?: Yes  Family / Caregiver Present: No  Referring Practitioner: Dr. Crista Puckett  Diagnosis: BLE DVT  Subjective  Subjective: Pt agreeable to OT evalt/x  Patient Currently in Pain: No  Pain Assessment  Pain Assessment: 0-10  Pain Level: 0  Vital Signs  Patient Currently in Pain: No  Social/Functional History  Social/Functional History  Lives With: Alone(Pt has been staying with her sister since Aug of 2020 since recooperating from bowel surgery)  Type of Home: House  Home Layout: One level  Home Access: Ramped entrance  Bathroom Shower/Tub: Walk-in shower, Shower chair with back  [employer]: Handicap height  Bathroom Equipment: Grab bars in shower, Grab bars around toilet  Bathroom Accessibility: Accessible  Home Equipment: Standard walker, Rolling walker, 4 wheeled walker, Lift chair, Cane, Reacher, [employer] cane, Long-handled shoehorn, Sock aid, Leg , Wheelchair-manual(Pt has a Rollator at home, work and her sisters)  Receives Help From: Family(Sister and Brother in law)  ADL Assistance: Independent  Homemaking Assistance: Independent  Homemaking Responsibilities: Yes  Ambulation Assistance: Independent  Transfer Assistance: Independent  Active : Yes  Mode of Transportation: Car  Occupation: Full time employment  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       Objective        Orientation  Overall Orientation Status: Within Normal Limits  Observation/Palpation  Observation: Telemetry, IV RUE     ADL  Feeding: Independent  Grooming: Independent  UE Bathing: Setup  LE Bathing: Minimal assistance  UE Dressing: Modified independent   LE Dressing: Minimal assistance  Toileting: Contact guard assistance  Additional Comments: PLOF uses AE for LB dressing including sock aide  Tone RUE  RUE Tone: Normotonic  Tone LUE  LUE Tone: Normotonic  Coordination  Movements Are Fluid And Coordinated: Yes  Coordination and Movement description: Gross motor impairments;Decreased speed;Left UE(LUE with lymphedema)                                LUE AROM (degrees)  LUE AROM : WFL  Left Hand AROM (degrees)  Left Hand AROM: WFL  RUE AROM (degrees)  RUE AROM : WFL  Right Hand AROM (degrees)  Right Hand AROM: WFL  LUE Strength  Gross LUE Strength: Exceptions to [facility] [place]  L Shoulder Flex: 3+/5  RUE Strength  Gross RUE Strength: Exceptions to [facility] [place]  R Shoulder Flex: 4-/5     Hand Dominance  Hand Dominance: Right             Plan   Plan  Times per week: 3-6x/wk  Times per day: Daily  Plan weeks: <1- med pt  Current Treatment Recommendations: Strengthening, ROM, Balance Training, Functional Mobility Training, Endurance Training, Safety Education & Training, Patient/Caregiver Education & Training, Self-Care / ADL, Home Management Training            Goals  Short term goals  Time Frame for Short term goals: 3-5 days- med pt  Short term goal 1: I BUE HEP  Short term goal 2: MI toileting  Short term goal 3: MI LB dressing and bathing using AE as PLOF  Short term goal 4: Inc to 5-7min stand miroslava/end for ClrTouch and I with ADL  Long term goals  Time Frame for Long term goals : Same as STGs  Long term goal 1: Same as STGs  Long term goal 2: Same as STGs  Patient Goals   Patient goals :  \"To be independent around my house\"       Therapy Time   Individual Concurrent Group Co-treatment   Time In  2:30         Time Out  3:15         Minutes  [address] [place], [place]

## 2020-12-09 LAB
ANION GAP SERPL CALCULATED.3IONS-SCNC: 7 MEQ/L (ref 9–15)
BUN BLDV-MCNC: 20 MG/DL (ref 8–23)
CALCIUM SERPL-MCNC: 9 MG/DL (ref 8.5–9.9)
CHLORIDE BLD-SCNC: 99 MEQ/L (ref 95–107)
CO2: 33 MEQ/L (ref 20–31)
CREAT SERPL-MCNC: 0.83 MG/DL (ref 0.5–0.9)
GFR AFRICAN AMERICAN: >60
GFR NON-AFRICAN AMERICAN: >60
GLUCOSE BLD-MCNC: 102 MG/DL (ref 60–115)
GLUCOSE BLD-MCNC: 148 MG/DL (ref 60–115)
GLUCOSE BLD-MCNC: 164 MG/DL (ref 60–115)
GLUCOSE BLD-MCNC: 174 MG/DL (ref 60–115)
GLUCOSE BLD-MCNC: 92 MG/DL (ref 70–99)
PERFORMED ON: ABNORMAL
PERFORMED ON: NORMAL
POTASSIUM SERPL-SCNC: 3.9 MEQ/L (ref 3.4–4.9)
PRO-BNP: 7227 PG/ML
SODIUM BLD-SCNC: 139 MEQ/L (ref 135–144)

## 2020-12-09 PROCEDURE — 83880 ASSAY OF NATRIURETIC PEPTIDE: CPT

## 2020-12-09 PROCEDURE — 97530 THERAPEUTIC ACTIVITIES: CPT

## 2020-12-09 PROCEDURE — 80048 BASIC METABOLIC PNL TOTAL CA: CPT

## 2020-12-09 PROCEDURE — 97116 GAIT TRAINING THERAPY: CPT

## 2020-12-09 PROCEDURE — 6360000002 HC RX W HCPCS: Performed by: INTERNAL MEDICINE

## 2020-12-09 PROCEDURE — 99222 1ST HOSP IP/OBS MODERATE 55: CPT | Performed by: INTERNAL MEDICINE

## 2020-12-09 PROCEDURE — 1210000000 HC MED SURG R&B

## 2020-12-09 PROCEDURE — 6370000000 HC RX 637 (ALT 250 FOR IP): Performed by: INTERNAL MEDICINE

## 2020-12-09 PROCEDURE — 36415 COLL VENOUS BLD VENIPUNCTURE: CPT

## 2020-12-09 PROCEDURE — 2580000003 HC RX 258: Performed by: INTERNAL MEDICINE

## 2020-12-09 PROCEDURE — G0378 HOSPITAL OBSERVATION PER HR: HCPCS

## 2020-12-09 PROCEDURE — 96376 TX/PRO/DX INJ SAME DRUG ADON: CPT

## 2020-12-09 PROCEDURE — 97110 THERAPEUTIC EXERCISES: CPT

## 2020-12-09 PROCEDURE — 2500000003 HC RX 250 WO HCPCS: Performed by: INTERNAL MEDICINE

## 2020-12-09 RX ORDER — FUROSEMIDE 10 MG/ML
20 INJECTION INTRAMUSCULAR; INTRAVENOUS 2 TIMES DAILY
Status: DISCONTINUED | OUTPATIENT
Start: 2020-12-09 | End: 2020-12-10 | Stop reason: HOSPADM

## 2020-12-09 RX ORDER — POTASSIUM CHLORIDE 750 MG/1
20 TABLET, EXTENDED RELEASE ORAL 2 TIMES DAILY
Status: DISCONTINUED | OUTPATIENT
Start: 2020-12-09 | End: 2020-12-10 | Stop reason: HOSPADM

## 2020-12-09 RX ORDER — POLYETHYLENE GLYCOL 3350 17 G/17G
17 POWDER, FOR SOLUTION ORAL 2 TIMES DAILY
Status: DISCONTINUED | OUTPATIENT
Start: 2020-12-09 | End: 2020-12-10 | Stop reason: HOSPADM

## 2020-12-09 RX ORDER — SENNA PLUS 8.6 MG/1
1 TABLET ORAL NIGHTLY
Status: DISCONTINUED | OUTPATIENT
Start: 2020-12-09 | End: 2020-12-10 | Stop reason: HOSPADM

## 2020-12-09 RX ADMIN — ASPIRIN 81 MG: 81 TABLET, COATED ORAL at 08:41

## 2020-12-09 RX ADMIN — Medication 10 ML: at 08:49

## 2020-12-09 RX ADMIN — HYDROCHLOROTHIAZIDE 25 MG: 25 TABLET ORAL at 08:40

## 2020-12-09 RX ADMIN — RIVAROXABAN 15 MG: 15 TABLET, FILM COATED ORAL at 17:10

## 2020-12-09 RX ADMIN — RIVAROXABAN 15 MG: 15 TABLET, FILM COATED ORAL at 08:41

## 2020-12-09 RX ADMIN — INSULIN GLARGINE 22 UNITS: 100 INJECTION, SOLUTION SUBCUTANEOUS at 20:46

## 2020-12-09 RX ADMIN — POTASSIUM CHLORIDE 20 MEQ: 750 TABLET, EXTENDED RELEASE ORAL at 20:44

## 2020-12-09 RX ADMIN — CARVEDILOL 25 MG: 25 TABLET, FILM COATED ORAL at 08:40

## 2020-12-09 RX ADMIN — CARVEDILOL 25 MG: 25 TABLET, FILM COATED ORAL at 17:11

## 2020-12-09 RX ADMIN — FUROSEMIDE 20 MG: 10 INJECTION, SOLUTION INTRAMUSCULAR; INTRAVENOUS at 08:41

## 2020-12-09 RX ADMIN — FUROSEMIDE 20 MG: 10 INJECTION, SOLUTION INTRAMUSCULAR; INTRAVENOUS at 17:11

## 2020-12-09 RX ADMIN — ACETAMINOPHEN 650 MG: 325 TABLET ORAL at 00:49

## 2020-12-09 RX ADMIN — ACETAMINOPHEN 650 MG: 325 TABLET ORAL at 22:09

## 2020-12-09 RX ADMIN — MICONAZOLE NITRATE: 20 POWDER TOPICAL at 08:57

## 2020-12-09 RX ADMIN — Medication 10 ML: at 20:45

## 2020-12-09 RX ADMIN — MICONAZOLE NITRATE: 20 POWDER TOPICAL at 20:44

## 2020-12-09 RX ADMIN — POTASSIUM CHLORIDE 20 MEQ: 750 TABLET, EXTENDED RELEASE ORAL at 08:43

## 2020-12-09 RX ADMIN — POLYETHYLENE GLYCOL 3350 17 G: 17 POWDER, FOR SOLUTION ORAL at 08:56

## 2020-12-09 ASSESSMENT — ENCOUNTER SYMPTOMS
NAUSEA: 0
COLOR CHANGE: 0
VOICE CHANGE: 0
ABDOMINAL DISTENTION: 0
WHEEZING: 0
CHEST TIGHTNESS: 0
BLOOD IN STOOL: 0
SHORTNESS OF BREATH: 0
ANAL BLEEDING: 0
APNEA: 0
TROUBLE SWALLOWING: 0
VOMITING: 0
FACIAL SWELLING: 0

## 2020-12-09 ASSESSMENT — PAIN SCALES - GENERAL
PAINLEVEL_OUTOF10: 3
PAINLEVEL_OUTOF10: 6
PAINLEVEL_OUTOF10: 3

## 2020-12-09 ASSESSMENT — PAIN DESCRIPTION - LOCATION: LOCATION: KNEE

## 2020-12-09 ASSESSMENT — PAIN DESCRIPTION - ORIENTATION: ORIENTATION: RIGHT;LEFT

## 2020-12-09 NOTE — PLAN OF CARE
Problem: SAFETY  Goal: Free from accidental physical injury  Outcome: Ongoing  Goal: Free from intentional harm  Outcome: Ongoing     Problem: PAIN  Goal: Patient's pain/discomfort is manageable  Outcome: Ongoing     Problem: SKIN INTEGRITY  Goal: Skin integrity is maintained or improved  Outcome: Ongoing     Problem: KNOWLEDGE DEFICIT  Goal: Patient/S.O. demonstrates understanding of disease process, treatment plan, medications, and discharge instructions.   Outcome: Ongoing     Problem: DISCHARGE BARRIERS  Goal: Patient's continuum of care needs are met  Outcome: Ongoing     Problem: Skin Integrity:  Goal: Will show no infection signs and symptoms  Description: Will show no infection signs and symptoms  Outcome: Ongoing  Goal: Absence of new skin breakdown  Description: Absence of new skin breakdown  Outcome: Ongoing     Problem: Pain:  Goal: Pain level will decrease  Description: Pain level will decrease  Outcome: Ongoing  Goal: Control of acute pain  Description: Control of acute pain  Outcome: Ongoing  Goal: Control of chronic pain  Description: Control of chronic pain  Outcome: Ongoing

## 2020-12-09 NOTE — PROGRESS NOTES
Physical Therapy  Facility/Department: [facility] MED SURG UNIT  Daily Treatment Note  NAME: Kash Vázquez  : 1945  MRN: [id number]    Date of Service: 2020    Discharge Recommendations:  [address] with assist PRN, Home with Home health PT        Assessment   Body structures, Functions, Activity limitations: Decreased functional mobility ; Decreased endurance;Decreased strength;Decreased balance; Increased pain  Assessment: Pt. with inc stiffness in bilat knees L >R rpeorts bone on bone and Lymphedema. Pt. able to participate in both supine and seated BLE therex. Issued HO for supine and seated therex per pt. request.  Educated AP, QS,GS for inc cuirculation throughout day. Mild SOB post [de-identified]' focused inc hip and knee flexion. Treatment Diagnosis: B LE DVTs  Prognosis: Good  REQUIRES PT FOLLOW UP: Yes     Patient Diagnosis(es): The primary encounter diagnosis was Acute deep vein thrombosis (DVT) of distal vein of both lower extremities ([facility] [address] Diagnoses of Peripheral edema and Acute on chronic congestive heart failure, unspecified heart failure type [facility]) were also pertinent to this visit. has a past medical history of Cancer ([facility] [address] Hyperlipidemia, Hypertension, Osteoarthritis, and Type II or unspecified type diabetes mellitus without mention of complication, not stated as uncontrolled. has a past surgical history that includes hernia repair; Hysterectomy; Breast surgery; and Bladder surgery. Restrictions     Subjective   General  Chart Reviewed: Yes  Additional Pertinent Hx: Pt admitted due to DVT B LE - Cleared with Dr Viviane crowder for PT/OT to work with pt  Family / Caregiver Present: No  Referring Practitioner: Dr Viviane Diaz  Subjective  Subjective: Pt. lying in bed agreeable to PT session. Pt. denies pain but states swelling makes it difficult for her to bend her knees.            Orientation     Cognition      Objective      Transfers  Sit to Stand: Contact guard assistance  Stand to sit: Contact guard assistance  Ambulation  Ambulation?: Yes  Ambulation 1  Surface: level tile  Device: Rollator  Assistance: Contact guard assistance  Quality of Gait: Pt. demos wide CHAVO dec step height and step length B with inc postural sway. VC for inc knee and hip flexion during gait. Distance: [de-identified]'   Comments: Mild SOB, lowered Rollator 2 notches to dec hiked shoulders. Stairs/Curb  Stairs?: No(has ramp. )     Balance  Sitting - Static: Good  Sitting - Dynamic: Good  Standing - Static: Fair  Standing - Dynamic: Fair  Exercises  Quad Sets: x20   Heelslides: 2 x10   Gluteal Sets: x 20  Hip Flexion: seated x 20  Hip Abduction: supine x 20   Knee Long Arc Quad: x 20   Ankle Pumps: x20   Other exercises  Other exercises 1: supine pillow squeezes x 20                         G-Code     OutComes Score                                                     AM-PAC Score             Goals  Short term goals  Time Frame for Short term goals: 1-4 days of Med Surg  Short term goal 1: Indep with bed mobility  Short term goal 2: Supervision with transfers  Short term goal 3: Pt able to ambulate with AD for >125'x 1 with a safe gait pattern  Short term goal 4: Pt able to tolerate 10 reps of ther ex  Short term goal 5: Pt able to be on her feet for > 5min before needing to rest  Long term goals  Time Frame for Long term goals : Same as STGs  Patient Goals   Patient goals : To get stronger to return home    Plan    Plan  Times per week: 5-7 days  Times per day: Daily(QD to BID)  Plan weeks: Recommend [facility] or HHC with assistance PRN  Current Treatment Recommendations: Strengthening, Transfer Training, Endurance Training, Pain Management, Balance Training, Gait Training, Home Exercise Program, Functional Mobility Training  Safety Devices  Type of devices:  All fall risk precautions in place, Call light within reach, Chair alarm in place, Gait belt, Left in chair     Therapy Time   Individual Concurrent Group

## 2020-12-09 NOTE — CONSULTS
Consult Note  Patient: Nazia Rabago  Unit/Bed: 3707/3132-34  YOB: 1945  MRN: [id number]  Acct: [de-identified]   Admitting Diagnosis: DVT, lower extremity, distal, acute, bilateral ([facility] [address] [I82.4Z3]  Date:  12/7/2020  Hospital Day: 2      Chief Complaint:  Shortness of breath and leg edema    History of Present Illness:  80-year-old female who was admitted with increasing leg edema for 3 days duration slightly tender. Also mildly short of breath. Denies any prolonged traveling or driving. No history of recent trauma. She was diagnosed to have DVT nonocclusive. And we were consulted. Patient had strangulated hernia in August 2020 and was operated at [facility] by the [facility] clinic surgeons. After that she has not been mobile much lying in the recliner.     Allergies   Allergen Reactions    Rocephin [Ceftriaxone Sodium]     Clindamycin Hives    Clindamycin/Lincomycin Rash    Minocycline Rash    Tetracyclines & Related Swelling     allergic to Minocin    Ceftriaxone Rash       Current Facility-Administered Medications   Medication Dose Route Frequency Provider Last Rate Last Dose    potassium chloride (KLOR-CON M) extended release tablet 20 mEq  20 mEq Oral BID Berta Langston MD   20 mEq at 12/09/20 0843    furosemide (LASIX) injection 20 mg  20 mg Intravenous BID Berta Langston MD   20 mg at 12/09/20 0841    polyethylene glycol (GLYCOLAX) packet 17 g  17 g Oral BID Berta Langston MD   17 g at 12/09/20 0856    senna (SENOKOT) tablet 8.6 mg  1 tablet Oral Nightly Berta Langston MD        miconazole (MICOTIN) 2 % powder   Topical BID Berta Langston MD        rivaroxaban (XARELTO) tablet 15 mg  15 mg Oral BID WC Berta Langston MD   15 mg at 12/09/20 0841    glucose (GLUTOSE) 40 % oral gel 15 g  15 g Oral PRN Berta Langston MD        dextrose 50 % IV solution  12.5 g Intravenous PRN Berta Langston MD        glucagon (rDNA) injection 1 mg  1 mg Intramuscular PRN Berta Langston MD        dextrose 5 % solution  100 mL/hr Intravenous PRN aVleriy Porras MD        sodium chloride flush 0.9 % injection 3 mL  3 mL Intravenous Q8H Roberto Coleman MD   3 mL at 12/08/20 1546    acetaminophen (TYLENOL) tablet 650 mg  650 mg Oral Q6H PRN Valeriy Porras MD        albuterol (PROVENTIL) nebulizer solution 2.5 mg  2.5 mg Nebulization Q4H PRN Valeriy Porras MD        aspirin EC tablet 81 mg  81 mg Oral Daily Valeriy Porras MD   81 mg at 12/09/20 0841    carvedilol (COREG) tablet 25 mg  25 mg Oral BID  Valeriy Porras MD   25 mg at 12/09/20 0840    hydroCHLOROthiazide (HYDRODIURIL) tablet 25 mg  25 mg Oral Daily Valeriy Porras MD   25 mg at 12/09/20 0840    insulin glargine (LANTUS) injection vial 22 Units  22 Units Subcutaneous Nightly Valeriy Porras MD   22 Units at 12/08/20 1954    sodium chloride flush 0.9 % injection 10 mL  10 mL Intravenous 2 times per day Valeriy Porras MD   10 mL at 12/09/20 0849    sodium chloride flush 0.9 % injection 10 mL  10 mL Intravenous PRN Valeriy Porras MD        acetaminophen (TYLENOL) tablet 650 mg  650 mg Oral Q6H PRN Valeriy Porras MD   650 mg at 12/09/20 0049    Or    acetaminophen (TYLENOL) suppository 650 mg  650 mg Rectal Q6H PRN Valeriy Porras MD        polyethylene glycol [facility]) packet 17 g  17 g Oral Daily PRN Valeriy Porras MD        promethazine (PHENERGAN) tablet 12.5 mg  12.5 mg Oral Q6H PRN Valeriy Porras MD        Or    ondansetron [facility]) injection 4 mg  4 mg Intravenous Q6H PRN Valeriy Porras MD        insulin lispro (HUMALOG) injection vial 0-12 Units  0-12 Units Subcutaneous TID  Valeriy Porras MD   4 Units at 12/08/20 1200    insulin lispro (HUMALOG) injection vial 0-6 Units  0-6 Units Subcutaneous Nightly Valeriy Porras MD   1 Units at 12/08/20 1954       PMHx:  Past Medical History:   Diagnosis Date    Cancer [facility])     bladder, breast,     Hyperlipidemia     Hypertension     Osteoarthritis     Type II or unspecified type diabetes mellitus without mention of complication, not stated as uncontrolled        PSHx:  Past Surgical History:   Procedure Laterality Date    BLADDER SURGERY      cancer    BREAST SURGERY      HERNIA REPAIR      HYSTERECTOMY         Social Hx:  Social History     Socioeconomic History    Marital status: Single     Spouse name: None    Number of children: None    Years of education: None    Highest education level: None   Occupational History    Occupation:    Social Needs    Financial resource strain: None    Food insecurity     Worry: None     Inability: None    Transportation needs     Medical: None     Non-medical: None   Tobacco Use    Smoking status: Former Smoker    Smokeless tobacco: Never Used    Tobacco comment: quit 40 years ago   Substance and Sexual Activity    Alcohol use: No    Drug use: No    Sexual activity: None   Lifestyle    Physical activity     Days per week: None     Minutes per session: None    Stress: None   Relationships    Social connections     Talks on phone: None     Gets together: None     Attends Alevism service: None     Active member of club or organization: None     Attends meetings of clubs or organizations: None     Relationship status: None    Intimate partner violence     Fear of current or ex partner: None     Emotionally abused: None     Physically abused: None     Forced sexual activity: None   Other Topics Concern    None   Social History Narrative    None       Family Hx:  History reviewed. No pertinent family history. Review of Systems:   Review of Systems   Constitutional: Negative for activity change, appetite change, diaphoresis, fatigue and unexpected weight change. HENT: Negative for facial swelling, nosebleeds, trouble swallowing and voice change. Respiratory: Negative for apnea, chest tightness, shortness of breath and wheezing. Cardiovascular: Negative for chest pain, palpitations and leg swelling.    Gastrointestinal: Negative for abdominal distention, anal bleeding, blood in stool, nausea and vomiting. Genitourinary: Negative for decreased urine volume and dysuria. Musculoskeletal: Negative for gait problem and myalgias. Skin: Negative for color change, pallor, rash and wound. Neurological: Negative for dizziness, syncope, facial asymmetry, weakness, light-headedness, numbness and headaches. Hematological: Does not bruise/bleed easily. Psychiatric/Behavioral: Negative for agitation, behavioral problems, confusion, hallucinations and suicidal ideas. The patient is not nervous/anxious. All other systems reviewed and are negative. Physical Examination:      Because of Covid, this was a telemedicine call.   In an attempt to prevent the spread of Covid  BP (!) 112/44   Pulse 97   Temp 97.9 °F (36.6 °C) (Oral)   Resp 16   Ht 5' (1.524 m)   Wt 266 lb 12.8 oz (121 kg)   SpO2 95%   BMI 52.11 kg/m²    LABS:  CBC:  Lab Results   Component Value Date    WBC 4.5 12/08/2020    RBC 3.86 12/08/2020    RBC 4.62 02/21/2012    HGB 11.5 12/08/2020    HCT 35.5 12/08/2020    MCV 91.9 12/08/2020    MCH 29.9 12/08/2020    MCHC 32.5 12/08/2020    RDW 15.5 12/08/2020     12/08/2020    MPV 7.8 02/21/2012     CBC with Differential:   Lab Results   Component Value Date    WBC 4.5 12/08/2020    RBC 3.86 12/08/2020    RBC 4.62 02/21/2012    HGB 11.5 12/08/2020    HCT 35.5 12/08/2020     12/08/2020    MCV 91.9 12/08/2020    MCH 29.9 12/08/2020    MCHC 32.5 12/08/2020    RDW 15.5 12/08/2020    LYMPHOPCT 23.2 12/07/2020    MONOPCT 14.3 12/07/2020    EOSPCT 0.8 02/21/2012    BASOPCT 0.6 12/07/2020    MONOSABS 1.0 12/07/2020    LYMPHSABS 1.5 12/07/2020    EOSABS 0.1 12/07/2020    BASOSABS 0.0 12/07/2020     CMP:    Lab Results   Component Value Date     12/09/2020    K 3.9 12/09/2020    K 3.3 12/08/2020    CL 99 12/09/2020    CO2 33 12/09/2020    BUN 20 12/09/2020    CREATININE 0.83 12/09/2020    GFRAA >60.0 12/09/2020    LABGLOM >60.0 12/09/2020    GLUCOSE 92 12/09/2020    GLUCOSE 149 02/21/2012    PROT 6.4 12/07/2020    LABALBU 3.7 12/07/2020    LABALBU 4.2 02/21/2012    CALCIUM 9.0 12/09/2020    BILITOT 0.6 12/07/2020    ALKPHOS 130 12/07/2020    AST 11 12/07/2020    ALT 9 12/07/2020     BMP:    Lab Results   Component Value Date     12/09/2020    K 3.9 12/09/2020    K 3.3 12/08/2020    CL 99 12/09/2020    CO2 33 12/09/2020    BUN 20 12/09/2020    LABALBU 3.7 12/07/2020    LABALBU 4.2 02/21/2012    CREATININE 0.83 12/09/2020    CALCIUM 9.0 12/09/2020    GFRAA >60.0 12/09/2020    LABGLOM >60.0 12/09/2020    GLUCOSE 92 12/09/2020    GLUCOSE 149 02/21/2012     Magnesium:    Lab Results   Component Value Date    MG 1.8 12/08/2020     Troponin:    Lab Results   Component Value Date    TROPONINI <0.010 12/07/2020       Radiology:  Xr Chest Portable    Result Date: 12/7/2020  Exam: XR CHEST PORTABLE History:  sob  for  chf Technique: AP portable view of the chest obtained. Comparison: None Chest x-ray portable Findings: The cardiac silhouette is at the upper limits of normal in size. There are no infiltrates, consolidations or effusions. Bones of the thorax appear intact. There are numerous surgical clips in the left axilla. No radiographic evidence of acute intrathoracic process. Us Dup Lower Extremities Bilateral Venous    Result Date: 12/7/2020  COMPARISON: November 12, 2019 HISTORY:  leg   thigh  pain  ch  swelling of  legs TECHNIQUE: US DUP LOWER EXTREMITIES BILATERAL VENOUS FINDINGS: Ultrasound of bilateral lower extremities was performed from the groin to the ankle. On the right there is thrombophlebitis seen in the right greater saphenous. The thrombus extends to the junction of the greater saphenous and common femoral vein. Blood flow is seen in the common femoral vein. The right femoral and popliteal veins are augmentable and compressible. The Doppler images show blood flow. The calf veins are not well visualized.  Thrombus is seen in the left greater saphenous vein proximal and mid and extends to the junction of the greater saphenous and common femoral vein. Blood flow is seen in the common femoral vein. The left femoral and popliteal veins are augmentable and compressible. The calf veins are not well seen. 1. Thrombus is seen bilaterally at the junction of the common femoral and greater saphenous veins. It is nonobstructing. 2. Bilateral thrombophlebitis       EKG:      Assessment:    Active Hospital Problems    Diagnosis Date Noted    DVT, lower extremity, distal, acute, bilateral ([facility] [address] [I82.4Z3] 12/07/2020          Plan:  1. Agree with oral anticoagulation  2. She has a history of bladder cancer being followed by [facility]. Also reportedly has a spot on the kidney which will be worked up beginning of the year  3.  If echo is not done will like to evaluate RV function            Electronically signed by Janny Gonzalez MD on 12/9/2020 at 10:39 AM

## 2020-12-09 NOTE — PROGRESS NOTES
Hospitalist Progress Note      PCP: Todd Hammond MD    Date of Admission: 12/7/2020    Chief Complaint: weakness    Subjective: pt awake/alert     Medications:  Reviewed    Infusion Medications    dextrose       Scheduled Medications    potassium chloride  20 mEq Oral BID    furosemide  20 mg Intravenous BID    miconazole   Topical BID    rivaroxaban  15 mg Oral BID WC    sodium chloride flush  3 mL Intravenous Q8H    aspirin  81 mg Oral Daily    carvedilol  25 mg Oral BID WC    hydroCHLOROthiazide  25 mg Oral Daily    insulin glargine  22 Units Subcutaneous Nightly    sodium chloride flush  10 mL Intravenous 2 times per day    insulin lispro  0-12 Units Subcutaneous TID WC    insulin lispro  0-6 Units Subcutaneous Nightly     PRN Meds: glucose, dextrose, glucagon (rDNA), dextrose, acetaminophen, albuterol, sodium chloride flush, acetaminophen **OR** acetaminophen, polyethylene glycol, promethazine **OR** ondansetron      Intake/Output Summary (Last 24 hours) at 12/9/2020 0732  Last data filed at 12/8/2020 1945  Gross per 24 hour   Intake 840 ml   Output 950 ml   Net -110 ml       Exam:    BP (!) 106/57   Pulse 90   Temp 97.7 °F (36.5 °C) (Oral)   Resp 18   Ht 5' (1.524 m)   Wt 266 lb 12.8 oz (121 kg)   SpO2 94%   BMI 52.11 kg/m²     General appearance: No apparent distress, appears stated age and cooperative. HEENT: Pupils equal, round, and reactive to light. Conjunctivae/corneas clear. Neck: Supple, with full range of motion. No jugular venous distention. Trachea midline. Respiratory:  Normal respiratory effort. Clear to auscultation, bilaterally without Rales/Wheezes/Rhonchi. Cardiovascular: Regular rate and rhythm with normal S1/S2 without murmurs, rubs or gallops. Abdomen: Soft, non-tender, non-distended with normal bowel sounds. Musculoskeletal:  edema bilaterally.    Skin:chronic lower legs skin changes   Neurologic:  Neurovascularly intact without any focal sensory/motor deficits. Cranial nerves: II-XII intact, grossly non-focal.  Psychiatric: Alert and oriented, thought content appropriate, normal insight      Labs:   Recent Labs     12/07/20  1526 12/08/20  0810   WBC 6.7 4.5*   HGB 12.3 11.5*   HCT 37.6 35.5*    179     Recent Labs     12/07/20  1526 12/08/20  0810 12/09/20  0537    138 139   K 3.5 3.3* 3.9   CL 95 98 99   CO2 28 29 33*   BUN 19 19 20   CREATININE 0.62 0.68 0.83   CALCIUM 9.3 8.9 9.0     Recent Labs     12/07/20  1526   AST 11   ALT 9   BILITOT 0.6   ALKPHOS 130     Recent Labs     12/07/20  1526   INR 1.2     Recent Labs     12/07/20  1526   TROPONINI <0.010       Urinalysis:      Lab Results   Component Value Date    NITRU Negative 12/07/2020    WBCUA 0-2 12/07/2020    BACTERIA RARE 12/07/2020    RBCUA NEG 02/21/2012    BLOODU Trace-intact 12/07/2020    SPECGRAV 1.015 12/07/2020    GLUCOSEU Negative 12/07/2020    GLUCOSEU NEG 02/21/2012       Radiology:  US DUP LOWER EXTREMITIES BILATERAL VENOUS   Final Result   1. Thrombus is seen bilaterally at the junction of the common femoral and greater saphenous veins. It is nonobstructing. 2. Bilateral thrombophlebitis      XR CHEST PORTABLE   Final Result   No radiographic evidence of acute intrathoracic process.                     Assessment/Plan:    Active Hospital Problems    Diagnosis Date Noted    DVT, lower extremity, distal, acute, bilateral ([facility] [address] [I82.4Z3] 12/07/2020         DVT Prophylaxis: xarelto  Diet: DIET GENERAL;  Code Status: Full Code    PT/OT Eval Status:     Dispo -  Bilateral DVT, non occlusive- full anticoagulation initiated, changing lovenox to xarelto today  Legs pain/edema/redness- improved with current Tx  New diagnosed CHF -EF 20%, continue with IV lasix another day, cardiology to see pt for further recommendations   HTN- controlled  DM- controlled with current Tx, follow up with ACCU check   Chronic constipation, history of SBO- post operative -resolved yesterday, follow up clinically  cronic bilateral legs skin changes/lymphedema-better with lasix  Morbid obesity with BMI 52%- supportive care  Medically stable for acute admission at [address] [place] [address] had non sustain 4 beats of V tach- continue B blocker, cardiology on case     Electronically signed by Rosanna Aguilar MD on 12/9/2020 at 7:32 AM

## 2020-12-09 NOTE — PROGRESS NOTES
Occupational Therapy  Facility/Department: [facility] MED SURG UNIT  Daily Treatment Note  NAME: Yeimy Ernandez  : 1945  MRN: [id number]    Date of Service: 2020    Discharge Recommendations:  Continue to assess pending progress, Home with Home health OT(or flip to [facility] for more therapy)       Assessment   Performance deficits / Impairments: Decreased functional mobility ; Decreased ADL status; Decreased strength;Decreased endurance;Decreased balance;Decreased high-level IADLs  Assessment: Pt tolerated BUE ther ex with 2# dowel for 1x10 reps each with RBs. Pt pleasant to work with. Prognosis: Good  History: multi comorb  Exam: 6 perf def/impair  REQUIRES OT FOLLOW UP: Yes  Safety Devices  Safety Devices in place: Yes  Type of devices: All fall risk precautions in place         Patient Diagnosis(es): The primary encounter diagnosis was Acute deep vein thrombosis (DVT) of distal vein of both lower extremities ([facility] [address] Diagnoses of Peripheral edema and Acute on chronic congestive heart failure, unspecified heart failure type [facility]) were also pertinent to this visit. has a past medical history of Cancer ([facility] [address] Hyperlipidemia, Hypertension, Osteoarthritis, and Type II or unspecified type diabetes mellitus without mention of complication, not stated as uncontrolled. has a past surgical history that includes hernia repair; Hysterectomy; Breast surgery; and Bladder surgery.     Restrictions  Restrictions/Precautions  Restrictions/Precautions: General Precautions     Subjective   General  Chart Reviewed: Yes  Patient assessed for rehabilitation services?: Yes  Family / Caregiver Present: No  Referring Practitioner: Dr. Coco Bui  Diagnosis: BLE DVT  Subjective  Subjective: Pt agreeable to OT  Pain Assessment  Pain Assessment: 0-10  Pain Level: 3  Pain Location: Knee  Pain Orientation: Right;Left(L > R)  Vital Signs  BP Location: Right lower arm  Level of Consciousness: Alert (0)  Patient Currently in Pain: Yes  Oxygen Therapy  O2 Device: None (Room air)     Objective     Exercises  Shoulder Flexion: 1x10 reps BUE with 2# dowel for press-ups, forward rows, and backward rows  Horizontal ABduction: 1x10 reps BUE with 2# dowel  Horizontal ADduction: 1x10 reps BUE with 2# dowel  Elbow Flexion: 1x10 reps BUE with 2# dowel  Elbow Extension: 1x10 reps BUE with 2# dowel for chest presses  Wrist Flexion: 1x10 reps BUE with 2# dowel  Wrist Extension: 1x10 reps BUE with 2# dowel  Other: to inc strength/end for ADL(RB between each set)             Plan   Plan  Times per week: 3-6x/wk  Times per day: Daily  Plan weeks: <1- med pt  Current Treatment Recommendations: Strengthening, ROM, Balance Training, Functional Mobility Training, Endurance Training, Safety Education & Training, Patient/Caregiver Education & Training, Self-Care / ADL, Home Management Training          Goals  Short term goals  Time Frame for Short term goals: 3-5 days- med pt  Short term goal 1: I BUE HEP  Short term goal 2: MI toileting  Short term goal 3: MI LB dressing and bathing using AE as PLOF  Short term goal 4: Inc to 5-7min stand miroslava/end for [place] and I with ADL  Long term goals  Time Frame for Long term goals : Same as STGs  Long term goal 1: Same as STGs  Long term goal 2: Same as STGs  Patient Goals   Patient goals :  \"To be independent around my house\"       Therapy Time   Individual Concurrent Group Co-treatment   Time In  11:30         Time Out  12:00         Minutes  [place], [place]

## 2020-12-09 NOTE — PROGRESS NOTES
Syl  Subjective  Subjective: (P) Pt. up in chair and reports trouble with B knees bone on bone, thighs are swollen, and DVT B LE. L LE is pinchy and ache 5/10. Pt. reports favoring L LE to much and hurting R LE eventually. Pt. reports struggle with getting in and out of bed at home and fear of falling at home. Pt. reports currently sleeping in recliner but would like to be able to sleep in bed if appropriate. Pain Screening  Patient Currently in Pain: (P) Yes  Vital Signs  Patient Currently in Pain: (P) Yes       Orientation     Cognition      Objective   Bed mobility training:  Supine -> sit: minimal assist with vc's for sequence. Sit ->Supine: moderate to max assistance with vc's for self functional sequence and completion to center of bed. Rolling: L sidelying contact guard assistance, R sidelying min assistance. Vc's used to improve self functional sequence with bending one LE in hooklying before reaching across midline. Bridging: (P) Maximum assistance;2 Person assistance  Scooting: (P) Maximal assistance;2 Person assistance(supine)  Comments:     Transfers  Sit to Stand: (P) Contact guard assistance  Stand to sit: (P) Contact guard assistance  Stand Pivot Transfers: (P) Stand by assistance;Contact guard assistance  Comment: (P) Vc's to encourage improving safety awareness and technique with locking rolling brakes before stand to sit, hand placement on seated surface before sit -> stand to increase controlled tsf skills. Pt. demo's slight flop with stand -> sit. Pt. reports having lift chair at home she uses. 2 tsf's completed with edge of bed and recliner surface.  Stand pivot tsf with ~ 165 degree stand pivot with stand by assistance/contact guard assist.    Ambulation  Ambulation?: (P) No     Balance  Sitting - Static: Good  Sitting - Dynamic: Good  Standing - Static: Fair  Standing - Dynamic: Fair  Exercises  Core Strengthening: Seated trunk core strengthening and ROM with flexion/extension x10',

## 2020-12-10 ENCOUNTER — HOSPITAL ENCOUNTER (INPATIENT)
Age: 75
LOS: 7 days | Discharge: HOME HEALTH | DRG: 300 | End: 2020-12-17
Attending: INTERNAL MEDICINE | Admitting: INTERNAL MEDICINE
Payer: MEDICARE

## 2020-12-10 VITALS
HEIGHT: 60 IN | HEART RATE: 98 BPM | WEIGHT: 266.8 LBS | OXYGEN SATURATION: 95 % | BODY MASS INDEX: 52.38 KG/M2 | SYSTOLIC BLOOD PRESSURE: 127 MMHG | DIASTOLIC BLOOD PRESSURE: 74 MMHG | RESPIRATION RATE: 18 BRPM | TEMPERATURE: 97.5 F

## 2020-12-10 LAB
ANION GAP SERPL CALCULATED.3IONS-SCNC: 9 MEQ/L (ref 9–15)
BUN BLDV-MCNC: 23 MG/DL (ref 8–23)
CALCIUM SERPL-MCNC: 9.1 MG/DL (ref 8.5–9.9)
CHLORIDE BLD-SCNC: 99 MEQ/L (ref 95–107)
CO2: 32 MEQ/L (ref 20–31)
CREAT SERPL-MCNC: 0.78 MG/DL (ref 0.5–0.9)
GFR AFRICAN AMERICAN: >60
GFR NON-AFRICAN AMERICAN: >60
GLUCOSE BLD-MCNC: 112 MG/DL (ref 70–99)
GLUCOSE BLD-MCNC: 282 MG/DL (ref 60–115)
GLUCOSE BLD-MCNC: 368 MG/DL (ref 60–115)
PERFORMED ON: ABNORMAL
PERFORMED ON: ABNORMAL
POTASSIUM SERPL-SCNC: 4 MEQ/L (ref 3.4–4.9)
PRO-BNP: 7794 PG/ML
SODIUM BLD-SCNC: 140 MEQ/L (ref 135–144)

## 2020-12-10 PROCEDURE — 6370000000 HC RX 637 (ALT 250 FOR IP): Performed by: INTERNAL MEDICINE

## 2020-12-10 PROCEDURE — 2500000003 HC RX 250 WO HCPCS: Performed by: ORTHOPAEDIC SURGERY

## 2020-12-10 PROCEDURE — 36415 COLL VENOUS BLD VENIPUNCTURE: CPT

## 2020-12-10 PROCEDURE — 80048 BASIC METABOLIC PNL TOTAL CA: CPT

## 2020-12-10 PROCEDURE — 97530 THERAPEUTIC ACTIVITIES: CPT

## 2020-12-10 PROCEDURE — 97116 GAIT TRAINING THERAPY: CPT

## 2020-12-10 PROCEDURE — 97535 SELF CARE MNGMENT TRAINING: CPT

## 2020-12-10 PROCEDURE — 1200000002 HC SEMI PRIVATE SWING BED

## 2020-12-10 PROCEDURE — 2580000003 HC RX 258: Performed by: INTERNAL MEDICINE

## 2020-12-10 PROCEDURE — 83880 ASSAY OF NATRIURETIC PEPTIDE: CPT

## 2020-12-10 PROCEDURE — G0378 HOSPITAL OBSERVATION PER HR: HCPCS

## 2020-12-10 PROCEDURE — 6360000002 HC RX W HCPCS: Performed by: ORTHOPAEDIC SURGERY

## 2020-12-10 PROCEDURE — 2500000003 HC RX 250 WO HCPCS: Performed by: INTERNAL MEDICINE

## 2020-12-10 PROCEDURE — 96376 TX/PRO/DX INJ SAME DRUG ADON: CPT

## 2020-12-10 PROCEDURE — 97110 THERAPEUTIC EXERCISES: CPT

## 2020-12-10 PROCEDURE — 6360000002 HC RX W HCPCS: Performed by: INTERNAL MEDICINE

## 2020-12-10 RX ORDER — BETAMETHASONE SODIUM PHOSPHATE AND BETAMETHASONE ACETATE 3; 3 MG/ML; MG/ML
24 INJECTION, SUSPENSION INTRA-ARTICULAR; INTRALESIONAL; INTRAMUSCULAR; SOFT TISSUE ONCE
Status: COMPLETED | OUTPATIENT
Start: 2020-12-10 | End: 2020-12-10

## 2020-12-10 RX ORDER — SODIUM CHLORIDE 0.9 % (FLUSH) 0.9 %
10 SYRINGE (ML) INJECTION PRN
Status: CANCELLED | OUTPATIENT
Start: 2020-12-10

## 2020-12-10 RX ORDER — DEXTROSE MONOHYDRATE 25 G/50ML
12.5 INJECTION, SOLUTION INTRAVENOUS PRN
Status: CANCELLED | OUTPATIENT
Start: 2020-12-10

## 2020-12-10 RX ORDER — HYDROCHLOROTHIAZIDE 25 MG/1
25 TABLET ORAL DAILY
Status: CANCELLED | OUTPATIENT
Start: 2020-12-10

## 2020-12-10 RX ORDER — NICOTINE POLACRILEX 4 MG
15 LOZENGE BUCCAL PRN
Status: CANCELLED | OUTPATIENT
Start: 2020-12-10

## 2020-12-10 RX ORDER — PROMETHAZINE HYDROCHLORIDE 12.5 MG/1
12.5 TABLET ORAL EVERY 6 HOURS PRN
Status: CANCELLED | OUTPATIENT
Start: 2020-12-10

## 2020-12-10 RX ORDER — FUROSEMIDE 10 MG/ML
20 INJECTION INTRAMUSCULAR; INTRAVENOUS 2 TIMES DAILY
Status: DISCONTINUED | OUTPATIENT
Start: 2020-12-11 | End: 2020-12-12

## 2020-12-10 RX ORDER — LIDOCAINE HYDROCHLORIDE 10 MG/ML
20 INJECTION, SOLUTION INFILTRATION; PERINEURAL ONCE
Status: COMPLETED | OUTPATIENT
Start: 2020-12-10 | End: 2020-12-10

## 2020-12-10 RX ORDER — POLYETHYLENE GLYCOL 3350 17 G/17G
17 POWDER, FOR SOLUTION ORAL 2 TIMES DAILY
Status: DISCONTINUED | OUTPATIENT
Start: 2020-12-10 | End: 2020-12-17 | Stop reason: HOSPADM

## 2020-12-10 RX ORDER — DEXTROSE MONOHYDRATE 25 G/50ML
12.5 INJECTION, SOLUTION INTRAVENOUS PRN
Status: DISCONTINUED | OUTPATIENT
Start: 2020-12-10 | End: 2020-12-17 | Stop reason: HOSPADM

## 2020-12-10 RX ORDER — ONDANSETRON 2 MG/ML
4 INJECTION INTRAMUSCULAR; INTRAVENOUS EVERY 6 HOURS PRN
Status: DISCONTINUED | OUTPATIENT
Start: 2020-12-10 | End: 2020-12-17 | Stop reason: HOSPADM

## 2020-12-10 RX ORDER — POTASSIUM CHLORIDE 750 MG/1
20 TABLET, EXTENDED RELEASE ORAL 2 TIMES DAILY
Status: CANCELLED | OUTPATIENT
Start: 2020-12-10

## 2020-12-10 RX ORDER — ALBUTEROL SULFATE 2.5 MG/3ML
2.5 SOLUTION RESPIRATORY (INHALATION) EVERY 4 HOURS PRN
Status: DISCONTINUED | OUTPATIENT
Start: 2020-12-10 | End: 2020-12-17 | Stop reason: HOSPADM

## 2020-12-10 RX ORDER — INSULIN GLARGINE 100 [IU]/ML
22 INJECTION, SOLUTION SUBCUTANEOUS NIGHTLY
Status: CANCELLED | OUTPATIENT
Start: 2020-12-10

## 2020-12-10 RX ORDER — ACETAMINOPHEN 325 MG/1
650 TABLET ORAL EVERY 6 HOURS PRN
Status: DISCONTINUED | OUTPATIENT
Start: 2020-12-10 | End: 2020-12-17 | Stop reason: HOSPADM

## 2020-12-10 RX ORDER — CARVEDILOL 25 MG/1
25 TABLET ORAL 2 TIMES DAILY WITH MEALS
Status: DISCONTINUED | OUTPATIENT
Start: 2020-12-11 | End: 2020-12-17 | Stop reason: HOSPADM

## 2020-12-10 RX ORDER — SODIUM CHLORIDE 0.9 % (FLUSH) 0.9 %
3 SYRINGE (ML) INJECTION EVERY 8 HOURS
Status: DISCONTINUED | OUTPATIENT
Start: 2020-12-10 | End: 2020-12-13

## 2020-12-10 RX ORDER — FUROSEMIDE 10 MG/ML
20 INJECTION INTRAMUSCULAR; INTRAVENOUS 2 TIMES DAILY
Status: CANCELLED | OUTPATIENT
Start: 2020-12-10

## 2020-12-10 RX ORDER — SENNA PLUS 8.6 MG/1
1 TABLET ORAL NIGHTLY
Status: CANCELLED | OUTPATIENT
Start: 2020-12-10

## 2020-12-10 RX ORDER — SODIUM CHLORIDE 0.9 % (FLUSH) 0.9 %
10 SYRINGE (ML) INJECTION EVERY 12 HOURS SCHEDULED
Status: DISCONTINUED | OUTPATIENT
Start: 2020-12-10 | End: 2020-12-14

## 2020-12-10 RX ORDER — SODIUM CHLORIDE 0.9 % (FLUSH) 0.9 %
3 SYRINGE (ML) INJECTION EVERY 8 HOURS
Status: CANCELLED | OUTPATIENT
Start: 2020-12-10

## 2020-12-10 RX ORDER — ACETAMINOPHEN 325 MG/1
650 TABLET ORAL EVERY 6 HOURS PRN
Status: CANCELLED | OUTPATIENT
Start: 2020-12-10

## 2020-12-10 RX ORDER — ASPIRIN 81 MG/1
81 TABLET ORAL DAILY
Status: CANCELLED | OUTPATIENT
Start: 2020-12-10

## 2020-12-10 RX ORDER — ALBUTEROL SULFATE 2.5 MG/3ML
2.5 SOLUTION RESPIRATORY (INHALATION) EVERY 4 HOURS PRN
Status: CANCELLED | OUTPATIENT
Start: 2020-12-10

## 2020-12-10 RX ORDER — ONDANSETRON 2 MG/ML
4 INJECTION INTRAMUSCULAR; INTRAVENOUS EVERY 6 HOURS PRN
Status: CANCELLED | OUTPATIENT
Start: 2020-12-10

## 2020-12-10 RX ORDER — HYDROCHLOROTHIAZIDE 25 MG/1
25 TABLET ORAL DAILY
Status: DISCONTINUED | OUTPATIENT
Start: 2020-12-10 | End: 2020-12-17 | Stop reason: HOSPADM

## 2020-12-10 RX ORDER — ACETAMINOPHEN 650 MG/1
650 SUPPOSITORY RECTAL EVERY 6 HOURS PRN
Status: CANCELLED | OUTPATIENT
Start: 2020-12-10

## 2020-12-10 RX ORDER — DEXTROSE MONOHYDRATE 50 MG/ML
100 INJECTION, SOLUTION INTRAVENOUS PRN
Status: CANCELLED | OUTPATIENT
Start: 2020-12-10

## 2020-12-10 RX ORDER — POLYETHYLENE GLYCOL 3350 17 G/17G
17 POWDER, FOR SOLUTION ORAL DAILY PRN
Status: DISCONTINUED | OUTPATIENT
Start: 2020-12-10 | End: 2020-12-17 | Stop reason: HOSPADM

## 2020-12-10 RX ORDER — SODIUM CHLORIDE 0.9 % (FLUSH) 0.9 %
10 SYRINGE (ML) INJECTION EVERY 12 HOURS SCHEDULED
Status: CANCELLED | OUTPATIENT
Start: 2020-12-10

## 2020-12-10 RX ORDER — PROMETHAZINE HYDROCHLORIDE 12.5 MG/1
12.5 TABLET ORAL EVERY 6 HOURS PRN
Status: DISCONTINUED | OUTPATIENT
Start: 2020-12-10 | End: 2020-12-17 | Stop reason: HOSPADM

## 2020-12-10 RX ORDER — POLYETHYLENE GLYCOL 3350 17 G/17G
17 POWDER, FOR SOLUTION ORAL DAILY PRN
Status: CANCELLED | OUTPATIENT
Start: 2020-12-10

## 2020-12-10 RX ORDER — ASPIRIN 81 MG/1
81 TABLET ORAL DAILY
Status: DISCONTINUED | OUTPATIENT
Start: 2020-12-10 | End: 2020-12-17 | Stop reason: HOSPADM

## 2020-12-10 RX ORDER — POTASSIUM CHLORIDE 750 MG/1
20 TABLET, EXTENDED RELEASE ORAL 2 TIMES DAILY
Status: DISCONTINUED | OUTPATIENT
Start: 2020-12-10 | End: 2020-12-17 | Stop reason: HOSPADM

## 2020-12-10 RX ORDER — SENNA PLUS 8.6 MG/1
1 TABLET ORAL NIGHTLY
Status: DISCONTINUED | OUTPATIENT
Start: 2020-12-10 | End: 2020-12-17 | Stop reason: HOSPADM

## 2020-12-10 RX ORDER — ACETAMINOPHEN 650 MG/1
650 SUPPOSITORY RECTAL EVERY 6 HOURS PRN
Status: DISCONTINUED | OUTPATIENT
Start: 2020-12-10 | End: 2020-12-17 | Stop reason: HOSPADM

## 2020-12-10 RX ORDER — INSULIN GLARGINE 100 [IU]/ML
22 INJECTION, SOLUTION SUBCUTANEOUS NIGHTLY
Status: DISCONTINUED | OUTPATIENT
Start: 2020-12-10 | End: 2020-12-12

## 2020-12-10 RX ORDER — POLYETHYLENE GLYCOL 3350 17 G/17G
17 POWDER, FOR SOLUTION ORAL 2 TIMES DAILY
Status: CANCELLED | OUTPATIENT
Start: 2020-12-10

## 2020-12-10 RX ORDER — SODIUM CHLORIDE 0.9 % (FLUSH) 0.9 %
10 SYRINGE (ML) INJECTION PRN
Status: DISCONTINUED | OUTPATIENT
Start: 2020-12-10 | End: 2020-12-17 | Stop reason: HOSPADM

## 2020-12-10 RX ORDER — NICOTINE POLACRILEX 4 MG
15 LOZENGE BUCCAL PRN
Status: DISCONTINUED | OUTPATIENT
Start: 2020-12-10 | End: 2020-12-17 | Stop reason: HOSPADM

## 2020-12-10 RX ORDER — DEXTROSE MONOHYDRATE 50 MG/ML
100 INJECTION, SOLUTION INTRAVENOUS PRN
Status: DISCONTINUED | OUTPATIENT
Start: 2020-12-10 | End: 2020-12-17 | Stop reason: HOSPADM

## 2020-12-10 RX ORDER — CARVEDILOL 25 MG/1
25 TABLET ORAL 2 TIMES DAILY WITH MEALS
Status: CANCELLED | OUTPATIENT
Start: 2020-12-10

## 2020-12-10 RX ADMIN — ACETAMINOPHEN 650 MG: 325 TABLET ORAL at 23:53

## 2020-12-10 RX ADMIN — BETAMETHASONE SODIUM PHOSPHATE AND BETAMETHASONE ACETATE 24 MG: 3; 3 INJECTION, SUSPENSION INTRA-ARTICULAR; INTRALESIONAL; INTRAMUSCULAR at 12:17

## 2020-12-10 RX ADMIN — POLYETHYLENE GLYCOL 3350 17 G: 17 POWDER, FOR SOLUTION ORAL at 20:04

## 2020-12-10 RX ADMIN — POTASSIUM CHLORIDE 20 MEQ: 750 TABLET, EXTENDED RELEASE ORAL at 20:04

## 2020-12-10 RX ADMIN — MICONAZOLE NITRATE: 20 POWDER TOPICAL at 20:06

## 2020-12-10 RX ADMIN — POLYETHYLENE GLYCOL 3350 17 G: 17 POWDER, FOR SOLUTION ORAL at 09:11

## 2020-12-10 RX ADMIN — HYDROCHLOROTHIAZIDE 25 MG: 25 TABLET ORAL at 09:11

## 2020-12-10 RX ADMIN — INSULIN GLARGINE 22 UNITS: 100 INJECTION, SOLUTION SUBCUTANEOUS at 20:48

## 2020-12-10 RX ADMIN — Medication 10 ML: at 09:14

## 2020-12-10 RX ADMIN — RIVAROXABAN 15 MG: 15 TABLET, FILM COATED ORAL at 09:11

## 2020-12-10 RX ADMIN — ASPIRIN 81 MG: 81 TABLET, COATED ORAL at 09:11

## 2020-12-10 RX ADMIN — Medication 10 ML: at 20:06

## 2020-12-10 RX ADMIN — POTASSIUM CHLORIDE 20 MEQ: 750 TABLET, EXTENDED RELEASE ORAL at 09:11

## 2020-12-10 RX ADMIN — RIVAROXABAN 15 MG: 15 TABLET, FILM COATED ORAL at 17:00

## 2020-12-10 RX ADMIN — SENNOSIDES 8.6 MG: 8.6 TABLET, FILM COATED ORAL at 20:04

## 2020-12-10 RX ADMIN — MICONAZOLE NITRATE: 20 POWDER TOPICAL at 09:16

## 2020-12-10 RX ADMIN — FUROSEMIDE 20 MG: 10 INJECTION, SOLUTION INTRAMUSCULAR; INTRAVENOUS at 09:12

## 2020-12-10 RX ADMIN — CARVEDILOL 25 MG: 25 TABLET, FILM COATED ORAL at 09:12

## 2020-12-10 RX ADMIN — CARVEDILOL 25 MG: 25 TABLET, FILM COATED ORAL at 17:00

## 2020-12-10 RX ADMIN — FUROSEMIDE 20 MG: 10 INJECTION, SOLUTION INTRAMUSCULAR; INTRAVENOUS at 17:00

## 2020-12-10 RX ADMIN — LIDOCAINE HYDROCHLORIDE 20 ML: 10 INJECTION, SOLUTION INFILTRATION; PERINEURAL at 12:19

## 2020-12-10 ASSESSMENT — PAIN SCALES - GENERAL
PAINLEVEL_OUTOF10: 2
PAINLEVEL_OUTOF10: 6
PAINLEVEL_OUTOF10: 2

## 2020-12-10 NOTE — DISCHARGE SUMMARY
Discharge Summary    Patient:  Williams Vargas  YOB: 1945    MRN: [id number]   Acct: [de-identified]    Primary Care Physician: Geo Overton MD    Admit date:  12/7/2020    Discharge date:   12/10/20      Discharge Diagnoses:   <principal problem not specified>  Active Problems:    DVT, lower extremity, distal, acute, bilateral ([address]  Resolved Problems:    * No resolved hospital problems. *      Admitted for: [facility] Course: patient was admitted with dyspnea, was diagnosed with bilateral DVT, full anticoagulation initiated. Also was found to have acute/chronic CHF with EF 20%- cardiology service evaluated pt. After completing her acute stay will be transferred to skilled status for post acute rehabilitation     Consultants:  cardio    Discharge Medications:       Medication List      STOP taking these medications    Niacin ER 1000 MG Tbcr        ASK your doctor about these medications    acetaminophen 500 MG tablet  Commonly known as:  TYLENOL  Ask about: Which instructions should I use?     ammonium lactate 12 % lotion  Commonly known as:  LAC-HYDRIN  Ask about: Which instructions should I use?     carvedilol 25 MG tablet  Commonly known as:  COREG  Ask about: Which instructions should I use? Ecotrin Low Strength 81 MG EC tablet  Generic drug:  aspirin  Ask about: Which instructions should I use?     furosemide 20 MG tablet  Commonly known as:  LASIX  Ask about: Which instructions should I use? HumaLOG KwikPen 100 UNIT/ML pen  Generic drug:  insulin lispro  Ask about: Which instructions should I use? Hyzaar 100-25 MG per tablet  Generic drug:  losartan-hydroCHLOROthiazide  Ask about: Which instructions should I use? Lantus SoloStar 100 UNIT/ML injection pen  Generic drug:  insulin glargine  Ask about: Which instructions should I use? potassium chloride 10 MEQ extended release capsule  Commonly known as:  MICRO-K  Ask about: Which instructions should I use?      ProAir  (90 Base) MCG/ACT inhaler  Generic drug:  albuterol sulfate HFA  Ask about: Which instructions should I use?     sulfaSALAzine 500 MG tablet  Commonly known as:  AZULFIDINE            Physical Exam:    Vitals:  Vitals:    12/09/20 2030 12/09/20 2206 12/10/20 0021 12/10/20 0738   BP: 116/63   108/67   Pulse: 102   95   Resp: 16 18  18   Temp:    97.5 °F (36.4 °C)   TempSrc:    Oral   SpO2: 94% 95% 94% 96%   Weight:       Height:         Weight: Weight: 266 lb 12.8 oz (121 kg)     24 hour intake/output:    Intake/Output Summary (Last 24 hours) at 12/10/2020 0419  Last data filed at 12/10/2020 0740  Gross per 24 hour   Intake 1200 ml   Output 2900 ml   Net -1700 ml       General appearance - alert, well appearing, and in no distress  Chest - clear to auscultation, no wheezes, rales or rhonchi, symmetric air entry  Heart - normal rate, regular rhythm, normal S1, S2, no murmurs, rubs, clicks or gallops  Abdomen - soft, nontender, nondistended, no masses or organomegaly  Obese: Yes; Protuberant: Yes   Neurological - alert, oriented, normal speech, no focal findings or movement disorder noted  Extremities - edema, chronic lower legs skin changes   Skin -         Radiology reports as per the Radiologist  Radiology: Xr Chest Portable    Result Date: 12/7/2020  Exam: XR CHEST PORTABLE History:  sob  for  chf Technique: AP portable view of the chest obtained. Comparison: None Chest x-ray portable Findings: The cardiac silhouette is at the upper limits of normal in size. There are no infiltrates, consolidations or effusions. Bones of the thorax appear intact. There are numerous surgical clips in the left axilla. No radiographic evidence of acute intrathoracic process.      Us Dup Lower Extremities Bilateral Venous    Result Date: 12/7/2020  COMPARISON: November 12, 2019 HISTORY:  leg   thigh  pain  ch  swelling of  legs TECHNIQUE: US DUP LOWER EXTREMITIES BILATERAL VENOUS FINDINGS: Ultrasound of bilateral lower Monocytes % 14.3 %    Eosinophils % 2.0 %    Basophils % 0.6 %    Neutrophils Absolute 4.0 1.4 - 6.5 K/uL    Lymphocytes Absolute 1.5 1.0 - 4.8 K/uL    Monocytes Absolute 1.0 (H) 0.2 - 0.8 K/uL    Eosinophils Absolute 0.1 0.0 - 0.7 K/uL    Basophils Absolute 0.0 0.0 - 0.2 K/uL   Magnesium   Result Value Ref Range    Magnesium 1.7 1.7 - 2.4 mg/dL   Troponin   Result Value Ref Range    Troponin <0.010 0.000 - 0.010 ng/mL   Brain Natriuretic Peptide   Result Value Ref Range    Pro-BNP 4,981 pg/mL   Protime-INR   Result Value Ref Range    Protime 14.8 12.3 - 14.9 sec    INR 1.2    Urine Reflex to Culture    Specimen: Urine, clean catch   Result Value Ref Range    Color, UA Yellow Straw/Yellow    Clarity, UA Clear Clear    Glucose, Ur Negative Negative mg/dL    Bilirubin Urine Negative Negative    Ketones, Urine Negative Negative mg/dL    Specific Gravity, UA 1.015 1.005 - 1.030    Blood, Urine Trace-intact Negative    pH, UA 6.0 5.0 - 9.0    Protein, UA 30 (A) Negative mg/dL    Urobilinogen, Urine 0.2 <2.0 E.U./dL    Nitrite, Urine Negative Negative    Leukocyte Esterase, Urine Negative Negative    Urine Reflex to Culture Not Indicated    Microscopic Urinalysis   Result Value Ref Range    WBC, UA 0-2 0 - 5 /HPF    Epithelial Cells, UA 0-2 /HPF    Bacteria, UA RARE (A) Negative /HPF    Amorphous, UA 1+    CBC   Result Value Ref Range    WBC 4.5 (L) 4.8 - 10.8 K/uL    RBC 3.86 (L) 4.20 - 5.40 M/uL    Hemoglobin 11.5 (L) 12.0 - 16.0 g/dL    Hematocrit 35.5 (L) 37.0 - 47.0 %    MCV 91.9 82.0 - 100.0 fL    MCH 29.9 27.0 - 31.3 pg    MCHC 32.5 (L) 33.0 - 37.0 %    RDW 15.5 (H) 11.5 - 14.5 %    Platelets 835 603 - 752 K/uL   Basic Metabolic Panel w/ Reflex to MG   Result Value Ref Range    Sodium 138 135 - 144 mEq/L    Potassium reflex Magnesium 3.3 (L) 3.4 - 4.9 mEq/L    Chloride 98 95 - 107 mEq/L    CO2 29 20 - 31 mEq/L    Anion Gap 11 9 - 15 mEq/L    Glucose 130 (H) 70 - 99 mg/dL    BUN 19 8 - 23 mg/dL    CREATININE 0.68 0.50 - 0.90 mg/dL    GFR Non-African American >60.0 >60    GFR  >60.0 >60    Calcium 8.9 8.5 - 9.9 mg/dL   Hemoglobin A1C   Result Value Ref Range    Hemoglobin A1C 8.4 (H) 4.8 - 5.9 %   Magnesium   Result Value Ref Range    Magnesium 1.8 1.7 - 2.4 mg/dL   Basic Metabolic Panel   Result Value Ref Range    Sodium 139 135 - 144 mEq/L    Potassium 3.9 3.4 - 4.9 mEq/L    Chloride 99 95 - 107 mEq/L    CO2 33 (H) 20 - 31 mEq/L    Anion Gap 7 (L) 9 - 15 mEq/L    Glucose 92 70 - 99 mg/dL    BUN 20 8 - 23 mg/dL    CREATININE 0.83 0.50 - 0.90 mg/dL    GFR Non-African American >60.0 >60    GFR  >60.0 >60    Calcium 9.0 8.5 - 9.9 mg/dL   Brain Natriuretic Peptide   Result Value Ref Range    Pro-BNP 7,227 pg/mL   Basic Metabolic Panel   Result Value Ref Range    Sodium 140 135 - 144 mEq/L    Potassium 4.0 3.4 - 4.9 mEq/L    Chloride 99 95 - 107 mEq/L    CO2 32 (H) 20 - 31 mEq/L    Anion Gap 9 9 - 15 mEq/L    Glucose 112 (H) 70 - 99 mg/dL    BUN 23 8 - 23 mg/dL    CREATININE 0.78 0.50 - 0.90 mg/dL    GFR Non-African American >60.0 >60    GFR  >60.0 >60    Calcium 9.1 8.5 - 9.9 mg/dL   Brain Natriuretic Peptide   Result Value Ref Range    Pro-BNP 7,794 pg/mL   POCT Glucose   Result Value Ref Range    POC Glucose 168 (H) 60 - 115 mg/dl    Performed on ACCU-CHEK    POCT Glucose   Result Value Ref Range    POC Glucose 128 (H) 60 - 115 mg/dl    Performed on ACCU-CHEK    POCT Glucose   Result Value Ref Range    POC Glucose 211 (H) 60 - 115 mg/dl    Performed on ACCU-CHEK    POCT Glucose   Result Value Ref Range    POC Glucose 136 (H) 60 - 115 mg/dl    Performed on ACCU-CHEK    POCT Glucose   Result Value Ref Range    POC Glucose 145 (H) 60 - 115 mg/dl    Performed on ACCU-CHEK    POCT Glucose   Result Value Ref Range    POC Glucose 102 60 - 115 mg/dl    Performed on ACCU-CHEK    POCT Glucose   Result Value Ref Range    POC Glucose 148 (H) 60 - 115 mg/dl    Performed on ACCU-CHEK    POCT Glucose   Result Value Ref Range    POC Glucose 164 (H) 60 - 115 mg/dl    Performed on ACCU-CHEK    POCT Glucose   Result Value Ref Range    POC Glucose 174 (H) 60 - 115 mg/dl    Performed on ACCU-CHEK    EKG 12 Lead - Chest Pain   Result Value Ref Range    Ventricular Rate 104 BPM    Atrial Rate 104 BPM    P-R Interval 172 ms    QRS Duration 116 ms    Q-T Interval 382 ms    QTc Calculation (Bazett) 502 ms    P Axis 41 degrees    R Axis -33 degrees    T Axis 131 degrees       Diet:  DIET GENERAL;     Activity:  Activity as tolerated (Patient may move about with assist as indicated or with supervision.)      Disposition: skilled status at [facility] 27: Stable    Time Spent: 45 minutes    Electronically signed by Alejandra Medeiros MD on 12/10/2020 at 8:06 AM    Discharging Hospitalist

## 2020-12-10 NOTE — CONSULTS
[address] [address]                                  CONSULTATION    PATIENT NAME: Sg Jackson                      :        1945  MED REC NO:   [id number]                              ROOM:       0202  ACCOUNT NO:   [de-identified]                           ADMIT DATE: 2020  PROVIDER:     Marlin Kendall MD    CONSULT DATE:  12/10/2020    HISTORY OF PRESENT ILLNESS:  The patient is a 73-year-old female well  known to our service with bilateral leg edema, skin changes from chronic  lymphatic edema and swelling below the calf, skin over the knees remain  intact. She has had DVTs, PEs. She is a nonsurgical candidate. She is  morbidly obese with severe end-stage arthritis. Her last injection was done as an outpatient in our office in 2020. The patient has been admitted to [facility] with shortness of  breath and rehabbing along with history of PE. PAST MEDICAL HISTORY:  Includes cancer, hyperlipidemia, hypertension,  osteoarthritis, morbid obesity, diabetes. PAST SURGICAL HISTORY:  Includes bladder surgery, breast surgery,  hysterectomy, hernia repair. MEDICATIONS:  Include Coreg, ProAir, Micro-K, Tylenol, Ecotrin, Lantus,  Humalog, Cozaar, and now she is on anticoagulation. ALLERGIES:  Include ROCEPHIN, CLINDAMYCIN, and CEFTRIAXONE. SOCIAL HISTORY:  Noncontributory for orthopedics. REVIEW OF SYSTEMS:  Noncontributory for orthopedics. PHYSICAL EXAMINATION:  HEENT:  Normocephalic and atraumatic head. HEART:  Regular rhythm. LUNGS:  Clear. ABDOMEN:  Nontender, nondistended. EXTREMITIES:  Right knee skin is intact over the knee with limited  motion, crepitus. Pain with 0 to about 100 degrees of motion. Collateral integrity is noted. The patient is morbidly obese and has  chronic skin changes below the calf, but the skin over the knee is  otherwise quite nice and intact. After understanding risks and benefits and obtaining permit, she was  injected. Each knee was injected with 3 mL of Celestone and 5 mL of  lidocaine plain 1% without complication at the bedside under routine  sterile conditions. She will be iced, elevated, and she can follow up  in the office _____ setting as an outpatient in an approximately three  to four months for repeat injection.         Lui Disla MD    D: 12/10/2020 31:23:62       T: 12/10/2020 14:12:35     [email or web address]  Job#: [id number]     Doc#: [id number]    CC:

## 2020-12-10 NOTE — PROGRESS NOTES
Occupational Therapy  Facility/Department: [facility] MED SURG UNIT  Daily Treatment Note  NAME: Neeraj García  : 1945  MRN: [id number]    Date of Service: 12/10/2020    Discharge Recommendations:  Continue to assess pending progress, Home with Home health OT(or flip to [facility] for more therapy)       Assessment   Performance deficits / Impairments: Decreased functional mobility ; Decreased ADL status; Decreased strength;Decreased endurance;Decreased balance;Decreased high-level IADLs  Assessment: Pt performed UB bathing setup/MI, LB bathing setup/SBA seated on shower chair using HH shower head and grab bars. Pt CGA shower chair xfer sit pivot using grab bars with vcs for safety/sequencing. Pt's R hand IV site had blood around it prior to shower- wrapped around the area and covered it prior to shower. The area was bleeding after- informed nsg. Prognosis: Good  History: multi comorb  Exam: 6 perf def/impair  REQUIRES OT FOLLOW UP: Yes         Patient Diagnosis(es): The primary encounter diagnosis was Acute deep vein thrombosis (DVT) of distal vein of both lower extremities ([address] Diagnoses of Peripheral edema and Acute on chronic congestive heart failure, unspecified heart failure type [facility]) were also pertinent to this visit. has a past medical history of Cancer ([address] Hyperlipidemia, Hypertension, Osteoarthritis, and Type II or unspecified type diabetes mellitus without mention of complication, not stated as uncontrolled. has a past surgical history that includes hernia repair; Hysterectomy; Breast surgery; and Bladder surgery.     Restrictions  Restrictions/Precautions  Restrictions/Precautions: General Precautions  Subjective   General  Chart Reviewed: Yes  Patient assessed for rehabilitation services?: Yes  Family / Caregiver Present: No  Referring Practitioner: Dr. Cartagena   Diagnosis: BLE DVT  Subjective  Subjective: Pt agreeable to shower  Pain Assessment  Pain Assessment: 0-10  Pain Level: 2  Vital Signs  Patient Currently in Pain: Yes      Objective    ADL  Grooming: Independent  UE Bathing: Setup;Supervision  LE Bathing: Setup;Stand by assistance  UE Dressing: Modified independent   LE Dressing: Minimal assistance; Moderate assistance(pants, MaxA socks)        Functional Mobility  Functional - Mobility Device: 4-Wheeled Walker  Activity: To/from bathroom  Assist Level: Contact guard assistance  Shower Transfers  Shower - Transfer From: (Rollator)  Shower - Transfer Type: To and From  Shower - Transfer To: Shower seat with back  Shower - Technique: Sit pivot  Shower Transfers: Contact Guard  Shower Transfers Comments: cues for safety/sequencing     Transfers  Sit to stand: Contact guard assistance  Stand to sit: Contact guard assistance                          Plan   Plan  Times per week: 3-6x/wk  Times per day: Daily  Plan weeks: <1- med pt  Current Treatment Recommendations: Strengthening, ROM, Balance Training, Functional Mobility Training, Endurance Training, Safety Education & Training, Patient/Caregiver Education & Training, Self-Care / ADL, Home Management Training          Goals  Short term goals  Time Frame for Short term goals: 3-5 days- med pt  Short term goal 1: I BUE HEP  Short term goal 2: MI toileting  Short term goal 3: MI LB dressing and bathing using AE as PLOF  Short term goal 4: Inc to 5-7min stand miroslava/end for Fay and I with ADL  Long term goals  Time Frame for Long term goals : Same as STGs  Long term goal 1: Same as STGs  Long term goal 2: Same as STGs  Patient Goals   Patient goals :  \"To be independent around my house\"       Therapy Time   Individual Concurrent Group Co-treatment   Time In  10:40         Time Out  11:40         Minutes  [address], [place]

## 2020-12-10 NOTE — PROGRESS NOTES
Physical Therapy  Facility/Department: [facility] MED SURG UNIT  Daily Treatment Note  NAME: Monalisa Hope  : 1945  MRN: [id number]    Date of Service: 12/10/2020    Discharge Recommendations:  [address] with assist PRN, Home with Home health PT        Assessment   Body structures, Functions, Activity limitations: Decreased functional mobility ; Decreased endurance;Decreased strength;Decreased balance; Increased pain  Assessment: Pt. perofrmed short distance gait trails focusing on inc distance and PLB. requires one seated RB to make it from room to therapy gym ~140' total.  Pt. limited by endurance and strength. Attempted supine therex for inc core control with Mod A to complete sit to supine transfer but pt. feeling nervous laying on small mat table. thus performed seated therex focusing on BLE and core strengthening. Fatigue poat and pain in L knee 4/10. Seated RB required with mild SOB. SPO2 between 91% and 95%. Treatment Diagnosis: B LE DVTs  Prognosis: Good  REQUIRES PT FOLLOW UP: Yes     Patient Diagnosis(es): The primary encounter diagnosis was Acute deep vein thrombosis (DVT) of distal vein of both lower extremities ([address] Diagnoses of Peripheral edema and Acute on chronic congestive heart failure, unspecified heart failure type [facility]) were also pertinent to this visit. has a past medical history of Cancer ([address] Hyperlipidemia, Hypertension, Osteoarthritis, and Type II or unspecified type diabetes mellitus without mention of complication, not stated as uncontrolled. has a past surgical history that includes hernia repair; Hysterectomy; Breast surgery; and Bladder surgery. Restrictions  Restrictions/Precautions  Restrictions/Precautions: General Precautions  Subjective   General  Chart Reviewed:  Yes  Additional Pertinent Hx: Pt admitted due to DVT B LE - Cleared with Dr Elias crowder for PT/OT to work with pt  Family / Caregiver Present: No  Referring Practitioner: Dr Crista Puckett  Subjective  Subjective: Pt. states she had a good workout yesterday and is feeling better today after she got some rest last night. Pt. states she is motivated with therapy and wants to feel confident prior to home going. Orientation     Cognition      Objective     Bed MObility  Supine to Sit: MIn A for trunk  Sit to Supine Mod A for BLE and sequencing   Transfers  Sit to Stand: Contact guard assistance  Stand to sit: Contact guard assistance  Comment: VC for hand placement and to lock brakes on Rollator prior to transfer also VC to control descent with limited carryover. Ambulation  Ambulation?: No     Balance  Sitting - Static: Good  Sitting - Dynamic: Good  Standing - Static: Fair  Standing - Dynamic: Fair  Exercises  Gluteal Sets: x20   Hip Flexion: seated x 10 with abd baracing   Knee Long Arc Quad: seated x 10 with abd bracing   Comments: performed setaed therex EOB to inc core control and BLE strength for advanced function. VC not to hold breath. Other exercises  Other exercises 2: seated trunk hip hinge x 10   Other exercises 3: seated trunk rotations x 10  Other exercises 4: seated abd bracing x 10  Other exercises 5: scp retractions x 10                         G-Code     OutComes Score                                                     AM-PAC Score             Goals  Short term goals  Time Frame for Short term goals: 1-4 days of Med Surg  Short term goal 1: Indep with bed mobility  Short term goal 2: Supervision with transfers  Short term goal 3: Pt able to ambulate with AD for >125'x 1 with a safe gait pattern  Short term goal 4: Pt able to tolerate 10 reps of ther ex  Short term goal 5: Pt able to be on her feet for > 5min before needing to rest  Long term goals  Time Frame for Long term goals : Same as STGs  Patient Goals   Patient goals :  To get stronger to return home    Plan    Plan  Times per week: 5-7 days  Times per day: Daily(QD to BID)  Plan weeks: Recommend [facility]

## 2020-12-10 NOTE — CONSULTS
Consult dict  Pt well known to / dr Jordan Tsai    xr c/w severe endstage djd    Last inj may 2020 at office  Non op candidate    frankie knee inj today  3 cc celestone and 5 cc lido plain 1% given    Before aspiration/injection risks/benefits of a cortisone injection including infection, local skin irritation, skin atrophy, calcification, continued pain/discomfort, elevated blood sugar, burning, failure to relieve pain, possible late infection were discussed with patient. Post-procedure discomfort can be alleviated with additional medications/ice/elevation/rest over the first 24 hours as recommended. Patient verbalized understanding and wanted to proceed with planned procedure. After informed consent was provided and allergies verified, the patient was positioned appropriately on hospital bed. Bilateral knee jt to be aspirated/ injected was prepped and draped with chloraprep to provide sterile environment. The skin was anesthetized ethyl chloride spray. If the joint was to be aspirated, 18-gauge needle attached to a syringe was inserted into the appropriate joint. The  fluid was aspirated. The syringe was removed and through the needle, a mixture 5 ml of 1% lidocaine and 3 ml of 6mg/ml of Celestone was injected into each knee. The needle was withdrawn and the puncture site sealed with a Band-Aid. The patient tolerated the procedure well.

## 2020-12-10 NOTE — PROGRESS NOTES
Chart reviewed. Patient was admitted to [facility] with DVT. Patient evaluated by PT/OT and therapies recommending [facility]. This  met with patient to discuss DC planning. Upon visit patient is alert and oriented to person, place, and situation. Patient appears well groomed with short kept hair and wearing hospital attire along with eye glasses. Patient reports an increase in weakness and reports feeling motivated to participate and get better with therapy at [facility]. Discussed DC options and reviewed PT/OT DC recommendations. Patient reports goal is to get stronger before returning home and desires to be admitted to [facility] unit for inpatient therapy upon DC from inpatient. It is worth noting that patient's insurance requires a precert before patient is eligible for inpatient skilled services. This  notified [facility] RN House supervisor whom plans to follow up.   SS to continue to follow

## 2020-12-10 NOTE — PROGRESS NOTES
Dr Kristine Reddy returned call. Celestone and Lidocaine ordered for injection into both knees. Injection will be done on Friday, 12/11.

## 2020-12-10 NOTE — PROGRESS NOTES
Physical Therapy  Facility/Department: [facility] MED SURG UNIT  Daily Treatment Note  NAME: Shana Mattson  : 1945  MRN: [id number]    Date of Service: 12/10/2020    Discharge Recommendations:  [address], Home with assist PRN, Home with Home health PT        Assessment     REQUIRES PT FOLLOW UP: Yes     Patient Diagnosis(es): The primary encounter diagnosis was Acute deep vein thrombosis (DVT) of distal vein of both lower extremities ([facility] Diagnoses of Peripheral edema and Acute on chronic congestive heart failure, unspecified heart failure type [facility]) were also pertinent to this visit. has a past medical history of Cancer ([facility] Hyperlipidemia, Hypertension, Osteoarthritis, and Type II or unspecified type diabetes mellitus without mention of complication, not stated as uncontrolled. has a past surgical history that includes hernia repair; Hysterectomy; Breast surgery; and Bladder surgery. Restrictions  Restrictions/Precautions  Restrictions/Precautions: General Precautions  Subjective Pt. Not seen this afternoon due to Cortisone Injections in B knees and to rest over next 24 hours. Orientation     Cognition      Objective                          G-Code     OutComes Score                                                     AM-PAC Score             Goals  Short term goals  Time Frame for Short term goals: 1-4 days of Med Surg  Short term goal 1: Indep with bed mobility  Short term goal 2: Supervision with transfers  Short term goal 3: Pt able to ambulate with AD for >125'x 1 with a safe gait pattern  Short term goal 4: Pt able to tolerate 10 reps of ther ex  Short term goal 5: Pt able to be on her feet for > 5min before needing to rest  Long term goals  Time Frame for Long term goals : Same as STGs  Patient Goals   Patient goals :  To get stronger to return home    Plan    Plan  Times per week: 5-7 days  Times per day: Daily(QD to BID)  Plan weeks: Recommend [facility] or HHC with assistance PRN  Current Treatment Recommendations: Strengthening, Transfer Training, Endurance Training, Pain Management, Balance Training, Gait Training, Home Exercise Program, Functional Mobility Training  Safety Devices  Type of devices:  All fall risk precautions in place, Call light within reach, Chair alarm in place, Gait belt, Left in chair     Therapy Time   Individual Concurrent Group Co-treatment   Time In           Time Out           Minutes  [address] PTA  License and [address] Number: [id number]

## 2020-12-11 PROBLEM — I50.43 CHF (CONGESTIVE HEART FAILURE), NYHA CLASS I, ACUTE ON CHRONIC, COMBINED (HCC): Status: ACTIVE | Noted: 2020-12-11

## 2020-12-11 LAB
ANION GAP SERPL CALCULATED.3IONS-SCNC: 8 MEQ/L (ref 9–15)
BUN BLDV-MCNC: 27 MG/DL (ref 8–23)
CALCIUM SERPL-MCNC: 9.3 MG/DL (ref 8.5–9.9)
CHLORIDE BLD-SCNC: 97 MEQ/L (ref 95–107)
CO2: 32 MEQ/L (ref 20–31)
CREAT SERPL-MCNC: 0.79 MG/DL (ref 0.5–0.9)
GFR AFRICAN AMERICAN: >60
GFR NON-AFRICAN AMERICAN: >60
GLUCOSE BLD-MCNC: 321 MG/DL (ref 60–115)
GLUCOSE BLD-MCNC: 322 MG/DL (ref 60–115)
GLUCOSE BLD-MCNC: 341 MG/DL (ref 60–115)
GLUCOSE BLD-MCNC: 368 MG/DL (ref 70–99)
GLUCOSE BLD-MCNC: 398 MG/DL (ref 60–115)
PERFORMED ON: ABNORMAL
POTASSIUM SERPL-SCNC: 4.5 MEQ/L (ref 3.4–4.9)
SODIUM BLD-SCNC: 137 MEQ/L (ref 135–144)

## 2020-12-11 PROCEDURE — 97530 THERAPEUTIC ACTIVITIES: CPT

## 2020-12-11 PROCEDURE — 2580000003 HC RX 258: Performed by: INTERNAL MEDICINE

## 2020-12-11 PROCEDURE — 6360000002 HC RX W HCPCS: Performed by: INTERNAL MEDICINE

## 2020-12-11 PROCEDURE — 1200000002 HC SEMI PRIVATE SWING BED

## 2020-12-11 PROCEDURE — 80048 BASIC METABOLIC PNL TOTAL CA: CPT

## 2020-12-11 PROCEDURE — 97162 PT EVAL MOD COMPLEX 30 MIN: CPT

## 2020-12-11 PROCEDURE — 97116 GAIT TRAINING THERAPY: CPT

## 2020-12-11 PROCEDURE — 97110 THERAPEUTIC EXERCISES: CPT

## 2020-12-11 PROCEDURE — 6370000000 HC RX 637 (ALT 250 FOR IP): Performed by: INTERNAL MEDICINE

## 2020-12-11 PROCEDURE — 97166 OT EVAL MOD COMPLEX 45 MIN: CPT

## 2020-12-11 PROCEDURE — 36415 COLL VENOUS BLD VENIPUNCTURE: CPT

## 2020-12-11 RX ADMIN — Medication 3 ML: at 06:42

## 2020-12-11 RX ADMIN — CARVEDILOL 25 MG: 25 TABLET, FILM COATED ORAL at 17:20

## 2020-12-11 RX ADMIN — INSULIN GLARGINE 22 UNITS: 100 INJECTION, SOLUTION SUBCUTANEOUS at 21:07

## 2020-12-11 RX ADMIN — POTASSIUM CHLORIDE 20 MEQ: 750 TABLET, EXTENDED RELEASE ORAL at 08:42

## 2020-12-11 RX ADMIN — HYDROCHLOROTHIAZIDE 25 MG: 25 TABLET ORAL at 08:43

## 2020-12-11 RX ADMIN — POLYETHYLENE GLYCOL 3350 17 G: 17 POWDER, FOR SOLUTION ORAL at 21:05

## 2020-12-11 RX ADMIN — ASPIRIN 81 MG: 81 TABLET, COATED ORAL at 08:43

## 2020-12-11 RX ADMIN — RIVAROXABAN 15 MG: 15 TABLET, FILM COATED ORAL at 17:20

## 2020-12-11 RX ADMIN — RIVAROXABAN 15 MG: 15 TABLET, FILM COATED ORAL at 08:42

## 2020-12-11 RX ADMIN — Medication 3 ML: at 17:05

## 2020-12-11 RX ADMIN — FUROSEMIDE 20 MG: 10 INJECTION, SOLUTION INTRAMUSCULAR; INTRAVENOUS at 08:43

## 2020-12-11 RX ADMIN — FUROSEMIDE 20 MG: 10 INJECTION, SOLUTION INTRAMUSCULAR; INTRAVENOUS at 17:20

## 2020-12-11 RX ADMIN — Medication 10 ML: at 08:56

## 2020-12-11 RX ADMIN — POTASSIUM CHLORIDE 20 MEQ: 750 TABLET, EXTENDED RELEASE ORAL at 21:05

## 2020-12-11 RX ADMIN — POLYETHYLENE GLYCOL 3350 17 G: 17 POWDER, FOR SOLUTION ORAL at 08:42

## 2020-12-11 RX ADMIN — SENNOSIDES 8.6 MG: 8.6 TABLET, FILM COATED ORAL at 21:05

## 2020-12-11 RX ADMIN — MICONAZOLE NITRATE: 20 POWDER TOPICAL at 08:56

## 2020-12-11 ASSESSMENT — PAIN DESCRIPTION - ORIENTATION
ORIENTATION: RIGHT;LEFT
ORIENTATION: LEFT

## 2020-12-11 ASSESSMENT — PAIN SCALES - GENERAL
PAINLEVEL_OUTOF10: 2
PAINLEVEL_OUTOF10: 0
PAINLEVEL_OUTOF10: 0

## 2020-12-11 ASSESSMENT — PAIN DESCRIPTION - DESCRIPTORS: DESCRIPTORS: DISCOMFORT;ACHING

## 2020-12-11 ASSESSMENT — PAIN DESCRIPTION - LOCATION
LOCATION: LEG
LOCATION: KNEE

## 2020-12-11 NOTE — H&P
mouth 3/16/11   Historical Provider, MD   furosemide (LASIX) 20 MG tablet TAKE 2 TABLETS EVERY MORNING AND 1 TABLET EVERY EVENING 11/7/16   Historical Provider, MD   insulin glargine (LANTUS SOLOSTAR) 100 UNIT/ML injection pen INJECT 22 UNITS AT BEDTIME 1/9/17   Historical Provider, MD   insulin lispro (HUMALOG KWIKPEN) 100 UNIT/ML pen Inject 6 to 8 units before breakfast, 8 units with lunch and 12 units at dinner 3/5/16   Historical Provider, MD   losartan-hydrochlorothiazide (HYZAAR) 100-25 MG per tablet Take 1 tablet by mouth 11/14/16   Historical Provider, MD       Allergies:  Rocephin [ceftriaxone sodium]; Clindamycin; Clindamycin/lincomycin; Minocycline; Tetracyclines & related; and Ceftriaxone    Social History:      The patient currently lives home    TOBACCO:   reports that she has quit smoking. She has never used smokeless tobacco.  ETOH:   reports no history of alcohol use. Family History:       Reviewed in detail and negative for DM, CAD, Cancer, CVA. Positive as follows:    No family history on file. REVIEW OF SYSTEMS:   Pertinent positives as noted in the HPI. All other systems reviewed and negative. PHYSICAL EXAM:    /77   Pulse 99   Temp 98.4 °F (36.9 °C) (Oral)   Resp 18   Ht 5' (1.524 m)   SpO2 95%   BMI 52.11 kg/m²     General appearance:  No apparent distress, appears stated age and cooperative. HEENT:  Normal cephalic, atraumatic without obvious deformity. Pupils equal, round, and reactive to light. Extra ocular muscles intact. Conjunctivae/corneas clear. Neck: Supple, with full range of motion. No jugular venous distention. Trachea midline. Respiratory:  Normal respiratory effort. Clear to auscultation, bilaterally without Rales/Wheezes/Rhonchi. Cardiovascular:  Regular rate and rhythm with normal S1/S2 without murmurs, rubs or gallops. Abdomen: Soft, non-tender, non-distended with normal bowel sounds. Musculoskeletal:   edema bilaterally.     Skin: Skin color, texture, turgor normal.  No rashes or lesions. Neurologic:  Neurovascularly intact without any focal sensory/motor deficits. Psychiatric:  Alert and oriented, thought content appropriate, normal insight        Labs:     Recent Labs     12/08/20  0810   WBC 4.5*   HGB 11.5*   HCT 35.5*        Recent Labs     12/09/20  0537 12/10/20  0611 12/11/20  0451    140 137   K 3.9 4.0 4.5   CL 99 99 97   CO2 33* 32* 32*   BUN 20 23 27*   CREATININE 0.83 0.78 0.79   CALCIUM 9.0 9.1 9.3     No results for input(s): AST, ALT, BILIDIR, BILITOT, ALKPHOS in the last 72 hours. No results for input(s): INR in the last 72 hours. No results for input(s): Joyice [place] in the last 72 hours. Urinalysis:      Lab Results   Component Value Date    NITRU Negative 12/07/2020    WBCUA 0-2 12/07/2020    BACTERIA RARE 12/07/2020    RBCUA NEG 02/21/2012    BLOODU Trace-intact 12/07/2020    SPECGRAV 1.015 12/07/2020    GLUCOSEU Negative 12/07/2020    GLUCOSEU NEG 02/21/2012           No orders to display       ASSESSMENT:    Active Hospital Problems    Diagnosis Date Noted    CHF (congestive heart failure), NYHA class I, acute on chronic, [facility] [I50.43] 12/11/2020       PLAN:        DVT Prophylaxis: xarelto  Diet: DIET GENERAL;  Code Status: Full Code    PT/OT Eval Status: done    Dispo -  Bilateral DVT, non occlusive- full anticoagulation initiated,  Legs pain/edema/redness- improved with current Tx  New diagnosed CHF -EF 20%, continue with IV lasix another day, cardiology on case   HTN- controlled  DM- controlled with current Tx, follow up with ACCU check   Chronic constipation,  follow up clinically  cronic bilateral legs skin changes/lymphedema-better with lasix  Morbid obesity with BMI 52%- supportive care  Medically stable for skilled admission at [facility], MD    Thank you Rios Urena MD for the opportunity to be involved in this patient's care.  If you have any questions or concerns please feel free to contact me.

## 2020-12-11 NOTE — PROGRESS NOTES
Physical Therapy    Facility/Department: Subacute  Initial Assessment    NAME: Pili Oden  : 1945  MRN: [id number]    Date of Service: 2020    Patient Diagnosis(es): There were no encounter diagnoses. has a past medical history of Cancer ([facility] [address] Hyperlipidemia, Hypertension, Osteoarthritis, and Type II or unspecified type diabetes mellitus without mention of complication, not stated as uncontrolled. has a past surgical history that includes hernia repair; Hysterectomy; Breast surgery; and Bladder surgery. Restrictions  Restrictions/Precautions  Restrictions/Precautions: General Precautions  Vision/Hearing        Subjective  General  Chart Reviewed: Yes  Patient assessed for rehabilitation services?: Yes(in subacute rehab as of 12/10/20)  Additional Pertinent Hx: Pt admitted due to DVT B LE - Cleared with Dr Loyd crowder for PT/OT to work with pt  Family / Caregiver Present: No  Referring Practitioner: Dr Loyd Brown  Referral Date : 20  Diagnosis: B LE DVT  General Comment  Comments: Sleeps at sister's house in [address] [place] [address], work station at patients [place] to be virtual . Pt reports unable to get into her own bed presently; lymphedema tx in BLE and LUE  Subjective  Subjective: Pt states injections from Dr Jung Silva yesterday to bilateral knees. Pt reports the injections usually last her about 6 mos with decreased knee pain.    Pain Screening  Patient Currently in Pain: Yes  Pain Assessment  Pain Assessment: 0-10  Pain Level: 2  Pain Location: Knee  Pain Orientation: Right;Left  Pain Descriptors: Discomfort;Aching(pulling pain knees, L thigh inner pulling- vericose vein?)  Vital Signs  Patient Currently in Pain: Yes  Patient Observation  Observations: glasses, IV RUE, swollen and obese LE, bruise at injection sites knees, discolored skin lower legs, no drainage this date; lassiter       Orientation       Social/Functional History  Social/Functional History  Lives With: Alone(sleeping at sisters in lift chair)  Type of Home: House  Home Layout: One level  Home Access: Ramped entrance(ramp pt house, sister house one step )  Bathroom Shower/Tub: Walk-in shower, Shower chair with back  [employer]: Handicap height  Bathroom Equipment: Grab bars in shower, Grab bars around toilet  Home Equipment: Standard walker, Rolling walker, 4 wheeled walker(Rollator at pt and sister's house)  Receives Help From: Family(sister, brother in law)  ADL Assistance: Independent  Homemaking Assistance: Independent  Homemaking Responsibilities: Yes  Ambulation Assistance: Independent(uses Rollator all of the time home and school)  Transfer Assistance: Independent  Active : Yes  Mode of Transportation: Car  Occupation: Full time employment  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  Additional Comments: ng LE into car- brother in law has been driving pt in [employer] as pt can't get LE into her own car currently - won't bend due to edema   Objective          AROM RLE (degrees)  RLE General AROM: limited approx 50% flex due to obesity and edema  AROM LLE (degrees)  LLE General AROM: limited flex to approx 505 due to obesity and LE edema   Strength RLE  Strength RLE: Exception  Comment: sitting available ROM  R Hip Flexion: 3+/5  R Hip Extension: 3-/5  R Hip ABduction: 3+/5  R Hip ADduction: 3-/5;3+/5(min resist,  dec ROM)  R Knee Flexion: 3+/5;4-/5  R Knee Extension: 3+/5  R Ankle Dorsiflexion: 4/5  R Ankle Plantar flexion: 4/5  Strength LLE  Strength LLE: Exception  Comment: sitting available ROM   L Hip Flexion: 3+/5  L Hip Extension: 3-/5  L Hip ABduction: 3+/5  L Hip ADduction: 3-/5;3+/5(min resist, dec ROM )  L Knee Flexion: 3+/5;4-/5  L Knee Extension: 3+/5  L Ankle Dorsiflexion: 4/5  L Ankle Plantar Flexion: 4/5  Coordination  Movements Are Fluid And Coordinated: (dec pae ROM ankel DF/PF)  Sensation  Overall Sensation Status: (light touch intact LE) Transfers  Sit to Stand: Contact guard assistance  Stand to sit: Contact guard assistance  Comment: safe technique with Rollator  Ambulation  Ambulation?: Yes  Ambulation 1  Surface: level tile  Device: Rollator  Assistance: Contact guard assistance  Quality of Gait: Trendelenberg gait for LEadvancement, mild SOb post walk, 3 minutes to recover, short step length, wide CHAVO  Distance: 80ft x 2  Comments: mild SOB post, 94% pre walks, 93% post walks  Stairs/Curb  Stairs?: No(NT will test when stronger)              Assessment   Body structures, Functions, Activity limitations: Decreased functional mobility ; Decreased ROM; Decreased strength;Decreased endurance; Increased pain;Decreased posture;Decreased sensation  Assessment: 75yof with bilateral LE DVT  with noted decline in mobilty and strength. Pt requires approx 10 days of therapy to regain funciton. Pt needs to be able to get LE into car and into bed if able, currently sleeping in recliner due to LE edema and dec strength. Pt motivated to do better   Treatment Diagnosis: Decreased mobiility and LE weakness post BLE DVT  REQUIRES PT FOLLOW UP: Yes  Activity Tolerance  Activity Tolerance: Patient Tolerated treatment well;Patient limited by pain; Patient limited by endurance     Discharge Recommendations:  Home with Home health PT, Home with assist PRN      Plan   Plan  Times per week: 5-7 days  Times per day: (1-2x per day)  Plan weeks: (1.5 wks)  Current Treatment Recommendations: Strengthening, Balance Training, Transfer Training, Endurance Training, Gait Training, Stair training         Goals  Short term goals  Time Frame for Short term goals: 3-5 days  Short term goal 1: supervised transfer with Rollator, no vc for safety   Short term goal 2: tolerate 2x5 lifting leg onto 10 inch stool  to simulate LE into car  Short term goal 3: Amb >= 120 ft with ROllator before fatigued  Short term goal 4: Assess 2 steps ^inch with 2 rails and <= Deedee marked time  Short term

## 2020-12-11 NOTE — PROGRESS NOTES
Physical Therapy  Facility/Department: [facility] MED SURG UNIT  Daily Treatment Note  NAME: Franck Quevedo  : 1945  MRN: [id number]    Date of Service: 2020    Discharge Recommendations:  Home with Home health PT, Home with assist PRN        Assessment   Body structures, Functions, Activity limitations: Decreased functional mobility ; Decreased ROM; Decreased strength;Decreased endurance; Increased pain;Decreased posture;Decreased sensation  Assessment: Pt. tolerates short distance gait trials with one seted and one standing RB mild SOB and trendelenburg gait. Pt. reuqires VC for breathing as well as inc hip and knee flexion along with heel strike for imporved pattern limited abilityto maintain with weakness. Practiced getting in and out of bed x 2 supine to sit SBA flat bed and use of HR. However, sit to supine mod A for BLE into bed and VC to scoot back also sit high toward HOB. Pt. more confident with ractice but will need to cont to work on mobility and strength to advance abilityto get in and out of bed. Pt. perofrmed few seated BLE exercises no inc in pain but inc fatigue. RB required during all activity but motivated with therapy and wants to get better. Treatment Diagnosis: Decreased mobiility and LE weakness post BLE DVT  Prognosis: Good  Decision Making: Medium Complexity  REQUIRES PT FOLLOW UP: Yes  Activity Tolerance  Activity Tolerance: Patient Tolerated treatment well;Patient limited by pain; Patient limited by endurance     Patient Diagnosis(es): There were no encounter diagnoses. has a past medical history of Cancer ([address] Hyperlipidemia, Hypertension, Osteoarthritis, and Type II or unspecified type diabetes mellitus without mention of complication, not stated as uncontrolled. has a past surgical history that includes hernia repair; Hysterectomy; Breast surgery; and Bladder surgery.     Restrictions  Restrictions/Precautions  Restrictions/Precautions: Contact Precautions(MRSA)  Subjective   General  Chart Reviewed: Yes  Additional Pertinent Hx: Pt admitted due to DVT B LE - Cleared with Dr Sarmad crowder for PT/OT to work with pt  Family / Caregiver Present: No  Referring Practitioner: Dr Angeles Hams: Pt. states if she counts wile she walks helps her so she doesn't hold her breath. Pt. reports she still feels mild pulling in L inner thigh. Orientation     Cognition      Objective    Bed Mobilty  Supine to Sit SBA flat bed, use of rail, VC for technique  Sit to Supine Mod A BLE into bed and to scoot back also sit toward HOB  2 trials     Transfers  Sit to Stand: Contact guard assistance  Stand to sit: Contact guard assistance  Comment: safe technique with Rollator  Ambulation  Ambulation?: Yes  Ambulation 1  Surface: level tile  Device: Rollator  Assistance: Contact guard assistance  Quality of Gait: Trendelenburg gait mild SOB seated and standing RB reuuired WBOS VC for inc heel strike limited carryover   Distance: 80ft x 2  Comments: SPO2 post 91% 1-2 min rest prior to further activity. Stairs/Curb  Stairs?: No(NT will test when stronger)     Balance  Sitting - Static: Good  Sitting - Dynamic: Good  Standing - Static: Fair  Standing - Dynamic: Fair  Exercises  Gluteal Sets: x20   Hip Flexion: seated x 10 hold 3 sec with abd baracing   Knee Long Arc Quad: x10 hold 3 sec   Ankle Pumps: x20                         G-Code     OutComes Score                                                     AM-PAC Score             Goals  Short term goals  Time Frame for Short term goals: 3-5 days  Short term goal 1: supervised transfer with Rollator, no vc for safety   Short term goal 2: tolerate 2x5 lifting leg onto 10 inch stool  to simulate LE into car  Short term goal 3: Amb >= 120 ft with ROllator before fatigued  Short term goal 4: Assess 2 steps ^inch with 2 rails and <= Deedee marked time  Short term goal 5:  Tolerate 2x10 sweated JOSUÉ to gain strength and endurance for function  Long term goals  Time Frame for Long term goals : 7-10 days  Long term goal 1: transfers modified independent   Long term goal 2: sit to supine in bed with sheet leg  with <= Min A   Long term goal 3: Amb >= 150ft with least AD before fatigued, less Trendeelenberg gait , more upright posture  Long term goal 4: 2 stairs with 2 rails with CGA   Long term goal 5: HEP in place for discharge; inc LE strength any amount to achieve functional goals   Patient Goals   Patient goals : To get stronger to return home    Plan    Plan  Times per week: 5-7 days  Times per day: (1-2x per day)  Plan weeks: (1.5 wks)  Current Treatment Recommendations: Strengthening, Balance Training, Transfer Training, Endurance Training, Gait Training, Stair training  Safety Devices  Type of devices:  All fall risk precautions in place, Call light within reach, Chair alarm in place, Gait belt, Left in chair     Therapy Time   Individual Concurrent Group Co-treatment   Time In  300         Time Out  400         Minutes  [facility], [facility]  License and [address] Number: [id number]

## 2020-12-11 NOTE — PROGRESS NOTES
Occupational Therapy   Occupational Therapy Initial Assessment- ELADIO  Date: 2020   Patient Name: [facility]  MRN: [id number]     : 1945    Date of Service: 2020    Discharge Recommendations:  Home with Home health OT, Home with assist PRN(or flip to ELADIO for more therapy)       Assessment   Performance deficits / Impairments: Decreased functional mobility ; Decreased ADL status; Decreased strength;Decreased endurance;Decreased balance;Decreased high-level IADLs  Assessment: Pt is a 78y/o female PLOF lives alone, was I/MI with ADL whom presented to [facility] 2* BLE DVT and was receiving skilled OT/PT under med/surg. Pt has transitioned to ELADIO at [facility] for cont'd therapy to address the above perf def/impair to maximize strength/end and safety/I with ADL for safe d/c transition. Prognosis: Good  Decision Making: Medium Complexity  History: multi comorb  Exam: 6 perf def/impair  OT Education: OT Role;Plan of Care  REQUIRES OT FOLLOW UP: Yes  Safety Devices  Safety Devices in place: Yes  Type of devices: All fall risk precautions in place           Patient Diagnosis(es): There were no encounter diagnoses. has a past medical history of Cancer ([facility] [address] Hyperlipidemia, Hypertension, Osteoarthritis, and Type II or unspecified type diabetes mellitus without mention of complication, not stated as uncontrolled. has a past surgical history that includes hernia repair; Hysterectomy; Breast surgery; and Bladder surgery.            Restrictions  Restrictions/Precautions  Restrictions/Precautions: Contact- MRSA    Subjective   General  Chart Reviewed: Yes  Patient assessed for rehabilitation services?: Yes(in subacute rehab as of 12/10/20)  Family / Caregiver Present: No  Referring Practitioner: Dr. Nicky Valdez  Diagnosis: BLE DVT  Subjective  Subjective: Pt pleasant, agreeable to OT  Patient Currently in Pain: Yes  Pain Assessment  Pain Assessment: 0-10  Pain Location: Leg(thigh)  Pain Orientation: Left  Vital Signs  Patient Currently in Pain: Yes  Height and Weight  Height: 5' (152.4 cm)     Social/Functional History  Social/Functional History  Lives With: Alone(sleeping at sisters in lift chair)  Type of Home: House  Home Layout: One level  Home Access: Ramped entrance(ramp pt house, sister house one step )  Bathroom Shower/Tub: Walk-in shower, Shower chair with back  [employer]: Handicap height  Bathroom Equipment: Grab bars in shower, Grab bars around toilet  Home Equipment: Standard walker, Rolling walker, 4 wheeled walker(Rollator at pt and sister's house)  [address] Help From: Family(sister, brother in law)  ADL Assistance: Independent  Homemaking Assistance: Independent  Homemaking Responsibilities: Yes  Ambulation Assistance: Independent(uses Rollator all of the time home and school)  Transfer Assistance: Independent  Active : Yes  Mode of Transportation: Car  Occupation: Full time employment  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  Additional Comments: Brother-in-law has been driving pt in [employer] as pt can't get LE into her own car currently - won't bend due to edema       Objective        Orientation  Overall Orientation Status: Within Normal Limits  Observation/Palpation  Observation: IV RUE     ADL  Feeding: Independent  Grooming: Independent  UE Bathing: Setup;Supervision  LE Bathing: Setup;Stand by assistance  UE Dressing: Modified independent   LE Dressing: Minimal assistance; Moderate assistance(pants, maxA socks)  Toileting: Contact guard assistance  Additional Comments: PLOF uses AE for LB dressing including sock aide  Tone RUE  RUE Tone: Normotonic  Tone LUE  LUE Tone: Normotonic  Coordination  Movements Are Fluid And Coordinated: Yes  Coordination and Movement description: Decreased speed;Left UE;Gross motor impairments(LUE with lymphedema)                       Sensation  Overall Sensation Status: (light touch intact LE)        LUE AROM (degrees)  LUE AROM : WFL  Left Hand AROM (degrees)  Left Hand AROM: WFL  RUE AROM (degrees)  RUE AROM : WFL  Right Hand AROM (degrees)  Right Hand AROM: WFL  LUE Strength  Gross LUE Strength: Exceptions to [facility]  L Shoulder Flex: 3+/5  RUE Strength  Gross RUE Strength: Exceptions to [facility]  R Shoulder Flex: 4-/5     Hand Dominance  Hand Dominance: Right             Plan   Plan  Times per week: 3-6x/wk  Times per day: Daily  Plan weeks: 7-10 days  Current Treatment Recommendations: Strengthening, ROM, Balance Training, Functional Mobility Training, Endurance Training, Safety Education & Training, Patient/Caregiver Education & Training, Self-Care / ADL, Home Management Training            Goals  Short term goals  Time Frame for Short term goals: 5-7 days  Short term goal 1: Tolerate 25-30min BUE ther ex/act to inc strength/end for ADL  Short term goal 2: SBA/Spv toileting  Short term goal 3: SBA/Spv LB dressing using AE prn as PLOF  Long term goals  Time Frame for Long term goals : 7-10 days  Long term goal 1: MI toileting  Long term goal 2: MI LB dressing and bathing using AE prn as PLOF  Long term goal 3: Inc to 5-7min stand miroslava/end for inc safety and I with ADL  Long term goal 4: I BUE HEP  Patient Goals   Patient goals :  \"To be independent around my house\"       Therapy Time   Individual Concurrent Group Co-treatment   Time In  1:30         Time Out  2:00         Minutes  [id number] [place], [place]

## 2020-12-11 NOTE — PROGRESS NOTES
Comprehensive Nutrition Assessment    Type and Reason for Visit:  Initial(Subacute admit for weakness s/p DVT & CHF exacerbation.)    Nutrition Recommendations/Plan: Modify Current Diet, general diet ordered. Modified to Carb Control: 4 carb choices (60 gms)/meal; No Added Salt (3-4 GM)    Nutrition Assessment:  Meets criteria for moderate malnutrition in chronic setting 2/2 decreased intake. Appetite has improved at this time, will monitor. No intervention initiated. Follow for LOS & provide diet ix prior to d/c. Malnutrition Assessment:  Malnutrition Status: Moderate malnutrition    Context:  Chronic Illness     Findings of the 6 clinical characteristics of malnutrition:  Energy Intake:  7 - 75% or less estimated energy requirements for 1 month or longer  Weight Loss:  No significant weight loss     Body Fat Loss:  No significant body fat loss     Muscle Mass Loss:  No significant muscle mass loss    Fluid Accumulation:  1 - Mild Extremities   Strength:  Not Performed    Estimated Daily Nutrient Needs:  Energy (kcal):  3744-8121 @ 11-12 kcal/kg; Weight Used for Energy Requirements:  Current     Protein (g):  91gr @ 2.0 gr/kg; Weight Used for Protein Requirements:  Ideal        Fluid (ml/day):  1365 ml/d; Method Used for Fluid Requirements:  ml/Kg      Nutrition Related Findings:  +2 BLE edema. Last BM 12/8. Glu [id number]. PMH: DM II, HTN, HDL, cancer, breat & bladder, CHF.       Wounds:  None       Current Nutrition Therapies:    DIET GENERAL; Carb Control: 4 carb choices (60 gms)/meal; No Added Salt (3-4 GM)    Anthropometric Measures:  · Height: 5' (152.4 cm)  · Current Body Weight: 266 lb 12.1 oz (121 kg)(12/7/20)   · Admission Body Weight: 266 lb 12.1 oz (121 kg)    · Usual Body Weight: 268 lb 15.4 oz (122 kg)(12/29/19 stated)     · Ideal Body Weight: 100 lbs; % Ideal Body Weight 266.8 %   · BMI: 52.1  · Adjusted Body Weight:  ; No Adjustment   · BMI Categories: Obese Class 3 (BMI 40.0 or greater)       Nutrition Diagnosis:   · Moderate malnutrition, In context of chronic illness related to inadequate protein-energy intake as evidenced by poor intake prior to admission      Nutrition Interventions:   Food and/or Nutrient Delivery:  Modify Current Diet  Nutrition Education/Counseling:  Education needed   Coordination of Nutrition Care:  Continue to monitor while inpatient, Interdisciplinary Rounds    Goals:  Intake > 75% of meals. Glu <180. Slow weight loss as edema resolves. Nutrition Monitoring and Evaluation:   Behavioral-Environmental Outcomes:  None Identified   Food/Nutrient Intake Outcomes:  Food and Nutrient Intake  Physical Signs/Symptoms Outcomes:  Biochemical Data, Fluid Status or Edema, Skin, Weight     Discharge Planning:     Too soon to determine     Electronically signed by Taniya Chua RD, LD on 12/11/20 at 12:33 PM EST

## 2020-12-11 NOTE — PLAN OF CARE
Nutrition Problem #1: Moderate malnutrition, In context of chronic illness  Intervention: Food and/or Nutrient Delivery: Modify Current Diet  Nutritional Goals: Intake > 75% of meals. Glu <180. Slow weight loss as edema resolves.

## 2020-12-12 LAB
ANION GAP SERPL CALCULATED.3IONS-SCNC: 12 MEQ/L (ref 9–15)
BUN BLDV-MCNC: 43 MG/DL (ref 8–23)
CALCIUM SERPL-MCNC: 9.4 MG/DL (ref 8.5–9.9)
CHLORIDE BLD-SCNC: 95 MEQ/L (ref 95–107)
CO2: 27 MEQ/L (ref 20–31)
CREAT SERPL-MCNC: 0.84 MG/DL (ref 0.5–0.9)
GFR AFRICAN AMERICAN: >60
GFR NON-AFRICAN AMERICAN: >60
GLUCOSE BLD-MCNC: 215 MG/DL (ref 60–115)
GLUCOSE BLD-MCNC: 292 MG/DL (ref 60–115)
GLUCOSE BLD-MCNC: 306 MG/DL (ref 60–115)
GLUCOSE BLD-MCNC: 308 MG/DL (ref 60–115)
GLUCOSE BLD-MCNC: 313 MG/DL (ref 70–99)
PERFORMED ON: ABNORMAL
POTASSIUM SERPL-SCNC: 4.6 MEQ/L (ref 3.4–4.9)
SODIUM BLD-SCNC: 134 MEQ/L (ref 135–144)

## 2020-12-12 PROCEDURE — 97530 THERAPEUTIC ACTIVITIES: CPT

## 2020-12-12 PROCEDURE — 2580000003 HC RX 258: Performed by: INTERNAL MEDICINE

## 2020-12-12 PROCEDURE — 6370000000 HC RX 637 (ALT 250 FOR IP): Performed by: INTERNAL MEDICINE

## 2020-12-12 PROCEDURE — 97116 GAIT TRAINING THERAPY: CPT

## 2020-12-12 PROCEDURE — 1200000002 HC SEMI PRIVATE SWING BED

## 2020-12-12 PROCEDURE — 97110 THERAPEUTIC EXERCISES: CPT

## 2020-12-12 PROCEDURE — 80048 BASIC METABOLIC PNL TOTAL CA: CPT

## 2020-12-12 PROCEDURE — 36415 COLL VENOUS BLD VENIPUNCTURE: CPT

## 2020-12-12 RX ORDER — LACTULOSE 10 G/15ML
20 SOLUTION ORAL 2 TIMES DAILY
Status: DISCONTINUED | OUTPATIENT
Start: 2020-12-12 | End: 2020-12-13

## 2020-12-12 RX ORDER — FUROSEMIDE 40 MG/1
40 TABLET ORAL DAILY
Status: DISCONTINUED | OUTPATIENT
Start: 2020-12-12 | End: 2020-12-17 | Stop reason: HOSPADM

## 2020-12-12 RX ORDER — INSULIN GLARGINE 100 [IU]/ML
20 INJECTION, SOLUTION SUBCUTANEOUS 2 TIMES DAILY
Status: DISCONTINUED | OUTPATIENT
Start: 2020-12-12 | End: 2020-12-13

## 2020-12-12 RX ADMIN — FUROSEMIDE 40 MG: 40 TABLET ORAL at 08:25

## 2020-12-12 RX ADMIN — CARVEDILOL 25 MG: 25 TABLET, FILM COATED ORAL at 08:14

## 2020-12-12 RX ADMIN — POTASSIUM CHLORIDE 20 MEQ: 750 TABLET, EXTENDED RELEASE ORAL at 08:14

## 2020-12-12 RX ADMIN — HYDROCHLOROTHIAZIDE 25 MG: 25 TABLET ORAL at 08:14

## 2020-12-12 RX ADMIN — INSULIN GLARGINE 20 UNITS: 100 INJECTION, SOLUTION SUBCUTANEOUS at 08:21

## 2020-12-12 RX ADMIN — INSULIN GLARGINE 20 UNITS: 100 INJECTION, SOLUTION SUBCUTANEOUS at 22:08

## 2020-12-12 RX ADMIN — CARVEDILOL 25 MG: 25 TABLET, FILM COATED ORAL at 16:57

## 2020-12-12 RX ADMIN — MICONAZOLE NITRATE: 20 POWDER TOPICAL at 23:06

## 2020-12-12 RX ADMIN — MICONAZOLE NITRATE: 20 POWDER TOPICAL at 08:24

## 2020-12-12 RX ADMIN — POLYETHYLENE GLYCOL 3350 17 G: 17 POWDER, FOR SOLUTION ORAL at 08:25

## 2020-12-12 RX ADMIN — POTASSIUM CHLORIDE 20 MEQ: 750 TABLET, EXTENDED RELEASE ORAL at 20:01

## 2020-12-12 RX ADMIN — RIVAROXABAN 15 MG: 15 TABLET, FILM COATED ORAL at 08:14

## 2020-12-12 RX ADMIN — ASPIRIN 81 MG: 81 TABLET, COATED ORAL at 08:14

## 2020-12-12 RX ADMIN — SENNOSIDES 8.6 MG: 8.6 TABLET, FILM COATED ORAL at 20:01

## 2020-12-12 RX ADMIN — RIVAROXABAN 15 MG: 15 TABLET, FILM COATED ORAL at 16:57

## 2020-12-12 RX ADMIN — POLYETHYLENE GLYCOL 3350 17 G: 17 POWDER, FOR SOLUTION ORAL at 20:02

## 2020-12-12 RX ADMIN — LACTULOSE 20 G: 20 SOLUTION ORAL at 08:24

## 2020-12-12 RX ADMIN — Medication 10 ML: at 20:25

## 2020-12-12 ASSESSMENT — PAIN SCALES - GENERAL
PAINLEVEL_OUTOF10: 0
PAINLEVEL_OUTOF10: 0

## 2020-12-12 ASSESSMENT — PAIN - FUNCTIONAL ASSESSMENT: PAIN_FUNCTIONAL_ASSESSMENT: ACTIVITIES ARE NOT PREVENTED

## 2020-12-12 ASSESSMENT — PAIN DESCRIPTION - PROGRESSION: CLINICAL_PROGRESSION: NOT CHANGED

## 2020-12-12 NOTE — PROGRESS NOTES
Skin: Skin color, texture, turgor normal.  No rashes or lesions. Neurologic:  Neurovascularly intact without any focal sensory/motor deficits. Cranial nerves: II-XII intact, grossly non-focal.  Psychiatric: Alert and oriented, thought content appropriate, normal insight        Labs:   No results for input(s): WBC, HGB, HCT, PLT in the last 72 hours. Recent Labs     12/10/20  0611 12/11/20  0451    137   K 4.0 4.5   CL 99 97   CO2 32* 32*   BUN 23 27*   CREATININE 0.78 0.79   CALCIUM 9.1 9.3     No results for input(s): AST, ALT, BILIDIR, BILITOT, ALKPHOS in the last 72 hours. No results for input(s): INR in the last 72 hours. No results for input(s): Thalia Pace in the last 72 hours.     Urinalysis:      Lab Results   Component Value Date    NITRU Negative 12/07/2020    WBCUA 0-2 12/07/2020    BACTERIA RARE 12/07/2020    RBCUA NEG 02/21/2012    BLOODU Trace-intact 12/07/2020    SPECGRAV 1.015 12/07/2020    GLUCOSEU Negative 12/07/2020    GLUCOSEU NEG 02/21/2012       Radiology:  No orders to display           Assessment/Plan:    Active Hospital Problems    Diagnosis Date Noted    CHF (congestive heart failure), NYHA class I, acute on chronic, combined ([facility] [I50.43] 12/11/2020         DVT Prophylaxis: xarelto  Diet: DIET GENERAL; Carb Control: 4 carb choices (60 gms)/meal; No Added Salt (3-4 GM)  Code Status: Full Code    PT/OT Eval Status: done    Dispo - Bilateral DVT, non occlusive- full anticoagulation initiated,  Legs pain/edema/redness- improved with current Tx  New diagnosed CHF -EF 20%, change IV lasix to PO today, cardiology on case   HTN- controlled  DM-hyperglycemia due to steroids injection- added lantus BID, follow up with ACCU check   Chronic constipation,  follow up clinically, add lactulose BID  cronic bilateral legs skin changes/lymphedema-better with lasix  Morbid obesity with BMI 52%- supportive care  Medically stable for skilled admission at [facility] Electronically signed by Destiny Gonzalez MD on 12/12/2020 at 7:12 AM

## 2020-12-12 NOTE — PROGRESS NOTES
Psychosocial Assessment     Physical Appearance: Overweight    Dress: Hospital attire    Hygiene: Good    Speech: Coherent    Affect/Mood: Appropriate, Calm and Cooperative    Alert and Orientated: Person and Place  And situation     Thought Process: Relevant    Behavior: Cooperative    Resides:Patient reports residing at home alone. Patient reports that she has been living with sister since August after abdominal surgery. 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 Patient reports \" I am usually able to talk myself out of it\" but may be interested in a prn medication when anxiety is bad     Suicidal/ Homicidal:  No    Food: Sister [employer] or friend [employer]. Sister or patient prepare meals. Patient reports being able to afford cost of food     Medication:[facility] for ongoing medication and [facility] in [place]. Patient reports being able to afford cost of medication     Transportation:Yes Patient reports that sister and brother-in-law have been aiding since transportation needs since Aug       Help at home needs: Patient reports that she is able to clean own laundry. This  provided patient with community resource book including but not limited to transportation resources, and assistance with household chores as patient identifies these two factors a challenge at times.   This  also discussed advanced directive with patient and provided patient with copy of advanced directives packet to review DC Recommendations:  Patient may benefit from [place] [address] upon DC. SS provide list of [place] [address] agencies for freedom of choice. SS to continue to follow and monitor patient's progress for further DC needs    Plan for transition of care is related to the following treatment goals: \" I want to be ambulatory and plus\" . .. \" I want to be able to breathe adequately and walk adequately\"  . ..  \" PT said they are going to try to teach me how to get in and out of my car again\"     The patient and/or patient representative  was provided with choice of provider, and agrees with the discharge plan: on-going     The patient and/or patient representative was provided with choice of all post acute providers and equipment and agrees with the discharge plan: on-going     Electronically signed by Mora Babinski, LSW on 12/12/2020 at 11:41 AM

## 2020-12-12 NOTE — PROGRESS NOTES
Physical Therapy  Facility/Department: [facility] MED SURG UNIT  Daily Treatment Note  NAME: Williams Vargas  : 1945  MRN: [id number]    Date of Service: 2020    Discharge Recommendations:  Home with Home health PT, Home with assist PRN        Assessment   Body structures, Functions, Activity limitations: Decreased functional mobility ; Decreased ROM; Decreased strength;Decreased endurance; Increased pain;Decreased posture;Decreased sensation  Assessment: Patient able to tolerate walking and hip strengthening without increase of pain at session end and was motivated to get stronger asking questions regarding exercises she could perform on her own. Treatment Diagnosis: Decreased mobiility and LE weakness post BLE DVT  Prognosis: Good  Decision Making: Medium Complexity  REQUIRES PT FOLLOW UP: Yes  Activity Tolerance  Activity Tolerance: Patient Tolerated treatment well;Patient limited by pain; Patient limited by endurance     Patient Diagnosis(es): There were no encounter diagnoses. has a past medical history of Cancer ([address] Hyperlipidemia, Hypertension, Osteoarthritis, and Type II or unspecified type diabetes mellitus without mention of complication, not stated as uncontrolled. has a past surgical history that includes hernia repair; Hysterectomy; Breast surgery; and Bladder surgery. Restrictions  Restrictions/Precautions  Restrictions/Precautions: Contact Precautions  Subjective   General  Chart Reviewed: Yes  Additional Pertinent Hx: Pt admitted due to DVT B LE - Cleared with Dr Pepito crowder for PT/OT to work with pt  Family / Caregiver Present: No  Referring Practitioner: Dr Hampton Saliva: Patient reports that she is feeling good. She mentions that she forgets to breathe during walking.   General Comment Comments: Sleeps at sister's house [place] [address] [place] [address], work station at patients house to be virtual . Pt reports unable to get into her own bed presently; lymphedema tx in BLE and LUE  Pain Screening  Patient Currently in Pain: Yes  Pain Assessment  Pain Assessment: 0-10  Pain Level: (not quantified, inner thigh, knee, foot, etc. mild)  Vital Signs  Patient Currently in Pain: Yes  Oxygen Therapy  SpO2: 95 %  O2 Device: None (Room air)       Orientation     Cognition      Objective      Transfers  Sit to Stand: Contact guard assistance  Stand to sit: Contact guard assistance  Comment: safe technique with Rollator  Ambulation 1  Surface: level tile  Device: Rollator  Assistance: Contact guard assistance  Quality of Gait: Intermittent Trendelenberg,   Distance: 80ft x 2  Stairs/Curb  Stairs?: No     Balance  Sitting - Static: Good  Sitting - Dynamic: Good  Standing - Static: Fair  Standing - Dynamic: Fair;-  Exercises  Gluteal Sets: x20   Hip Flexion: March in seated x 10  Hip Abduction: x 20  Knee Long Arc Quad: x10 hold 3 sec   Ankle Pumps: x20   Other exercises  Other exercises?: Yes  Other exercises 1: Seated pillow squeeze x 10                        G-Code     OutComes Score                                                     AM-PAC Score             Goals  Short term goals  Time Frame for Short term goals: 3-5 days  Short term goal 1: supervised transfer with Rollator, no vc for safety   Short term goal 2: tolerate 2x5 lifting leg onto 10 inch stool  to simulate LE into car  Short term goal 3: Amb >= 120 ft with ROllator before fatigued  Short term goal 4: Assess 2 steps ^inch with 2 rails and <= Deedee marked time  Short term goal 5:  Tolerate 2x10 sweated JOSUÉ to gain strength and endurance for function  Long term goals  Time Frame for Long term goals : 7-10 days  Long term goal 1: transfers modified independent   Long term goal 2: sit to supine in bed with sheet leg  with <= Min A Long term goal 3: Amb >= 150ft with least AD before fatigued, less Trendeelenberg gait , more upright posture  Long term goal 4: 2 stairs with 2 rails with CGA   Long term goal 5: HEP in place for discharge; inc LE strength any amount to achieve functional goals   Patient Goals   Patient goals : To get stronger to return home    Plan    Plan  Times per week: 5-7 days  Times per day: (1-2x per day)  Plan weeks: (1.5 wks)  Current Treatment Recommendations: Strengthening, Balance Training, Transfer Training, Endurance Training, Gait Training, Stair training  Safety Devices  Type of devices:  All fall risk precautions in place, Call light within reach, Chair alarm in place, Gait belt, Left in chair     Therapy Time   Individual Concurrent Group Co-treatment   Time In  1000         Time Out  1000 [facility]  License and [address] Number: [id number]

## 2020-12-13 LAB
GLUCOSE BLD-MCNC: 111 MG/DL (ref 60–115)
GLUCOSE BLD-MCNC: 140 MG/DL (ref 60–115)
GLUCOSE BLD-MCNC: 162 MG/DL (ref 60–115)
GLUCOSE BLD-MCNC: 197 MG/DL (ref 60–115)
PERFORMED ON: ABNORMAL
PERFORMED ON: NORMAL

## 2020-12-13 PROCEDURE — 2580000003 HC RX 258: Performed by: INTERNAL MEDICINE

## 2020-12-13 PROCEDURE — 97116 GAIT TRAINING THERAPY: CPT

## 2020-12-13 PROCEDURE — 97530 THERAPEUTIC ACTIVITIES: CPT

## 2020-12-13 PROCEDURE — 1200000002 HC SEMI PRIVATE SWING BED

## 2020-12-13 PROCEDURE — 97110 THERAPEUTIC EXERCISES: CPT

## 2020-12-13 PROCEDURE — 6370000000 HC RX 637 (ALT 250 FOR IP): Performed by: INTERNAL MEDICINE

## 2020-12-13 RX ORDER — INSULIN GLARGINE 100 [IU]/ML
20 INJECTION, SOLUTION SUBCUTANEOUS NIGHTLY
Status: DISCONTINUED | OUTPATIENT
Start: 2020-12-13 | End: 2020-12-17 | Stop reason: HOSPADM

## 2020-12-13 RX ADMIN — Medication 3 ML: at 17:04

## 2020-12-13 RX ADMIN — RIVAROXABAN 15 MG: 15 TABLET, FILM COATED ORAL at 17:04

## 2020-12-13 RX ADMIN — MICONAZOLE NITRATE: 20 POWDER TOPICAL at 20:18

## 2020-12-13 RX ADMIN — Medication 10 ML: at 20:17

## 2020-12-13 RX ADMIN — HYDROCHLOROTHIAZIDE 25 MG: 25 TABLET ORAL at 09:47

## 2020-12-13 RX ADMIN — CARVEDILOL 25 MG: 25 TABLET, FILM COATED ORAL at 09:47

## 2020-12-13 RX ADMIN — ASPIRIN 81 MG: 81 TABLET, COATED ORAL at 09:46

## 2020-12-13 RX ADMIN — POTASSIUM CHLORIDE 20 MEQ: 750 TABLET, EXTENDED RELEASE ORAL at 09:48

## 2020-12-13 RX ADMIN — FUROSEMIDE 40 MG: 40 TABLET ORAL at 09:46

## 2020-12-13 RX ADMIN — INSULIN GLARGINE 20 UNITS: 100 INJECTION, SOLUTION SUBCUTANEOUS at 20:13

## 2020-12-13 RX ADMIN — MICONAZOLE NITRATE: 20 POWDER TOPICAL at 09:49

## 2020-12-13 RX ADMIN — Medication 10 ML: at 09:49

## 2020-12-13 RX ADMIN — POTASSIUM CHLORIDE 20 MEQ: 750 TABLET, EXTENDED RELEASE ORAL at 20:08

## 2020-12-13 RX ADMIN — CARVEDILOL 25 MG: 25 TABLET, FILM COATED ORAL at 17:04

## 2020-12-13 RX ADMIN — RIVAROXABAN 15 MG: 15 TABLET, FILM COATED ORAL at 09:46

## 2020-12-13 ASSESSMENT — PAIN DESCRIPTION - PROGRESSION
CLINICAL_PROGRESSION: NOT CHANGED

## 2020-12-13 ASSESSMENT — PAIN SCALES - GENERAL
PAINLEVEL_OUTOF10: 0

## 2020-12-13 ASSESSMENT — PAIN - FUNCTIONAL ASSESSMENT
PAIN_FUNCTIONAL_ASSESSMENT: ACTIVITIES ARE NOT PREVENTED
PAIN_FUNCTIONAL_ASSESSMENT: ACTIVITIES ARE NOT PREVENTED

## 2020-12-13 NOTE — PROGRESS NOTES
Hospitalist Progress Note      PCP: Priscilla Mcneal MD    Date of Admission: 12/10/2020    Chief Complaint: weakness    Subjective: pt in chair, looks comfortable     Medications:  Reviewed    Infusion Medications    dextrose       Scheduled Medications    insulin glargine  20 Units Subcutaneous Nightly    furosemide  40 mg Oral Daily    lactulose  20 g Oral BID    sodium chloride flush  10 mL Intravenous 2 times per day    aspirin  81 mg Oral Daily    carvedilol  25 mg Oral BID WC    hydroCHLOROthiazide  25 mg Oral Daily    insulin lispro  0-12 Units Subcutaneous TID WC    insulin lispro  0-6 Units Subcutaneous Nightly    miconazole   Topical BID    polyethylene glycol  17 g Oral BID    potassium chloride  20 mEq Oral BID    rivaroxaban  15 mg Oral BID WC    senna  1 tablet Oral Nightly    sodium chloride flush  3 mL Intravenous Q8H     PRN Meds: acetaminophen **OR** acetaminophen, polyethylene glycol, promethazine **OR** ondansetron, sodium chloride flush, dextrose, dextrose, glucagon (rDNA), glucose, acetaminophen, albuterol    No intake or output data in the 24 hours ending 12/13/20 0707    Exam:    BP (!) 106/54   Pulse 71   Temp 97.8 °F (36.6 °C) (Oral)   Resp 18   Ht 5' (1.524 m)   Wt 263 lb 3.2 oz (119.4 kg)   SpO2 95%   BMI 51.40 kg/m²     General appearance: No apparent distress, appears stated age and cooperative. HEENT: Pupils equal, round, and reactive to light. Conjunctivae/corneas clear. Neck: Supple, with full range of motion. No jugular venous distention. Trachea midline. Respiratory:  Normal respiratory effort. Clear to auscultation, bilaterally without Rales/Wheezes/Rhonchi. Cardiovascular: Regular rate and rhythm with normal S1/S2 without murmurs, rubs or gallops. Abdomen: Soft, non-tender, non-distended with normal bowel sounds. Musculoskeletal: edema bilaterally.    Skin: chronic lower legs changes Neurologic:  Neurovascularly intact without any focal sensory/motor deficits. Cranial nerves: II-XII intact, grossly non-focal.  Psychiatric: Alert and oriented, thought content appropriate, normal insight        Labs:   No results for input(s): WBC, HGB, HCT, PLT in the last 72 hours. Recent Labs     12/11/20  0451 12/12/20  0715    134*   K 4.5 4.6   CL 97 95   CO2 32* 27   BUN 27* 43*   CREATININE 0.79 0.84   CALCIUM 9.3 9.4     No results for input(s): AST, ALT, BILIDIR, BILITOT, ALKPHOS in the last 72 hours. No results for input(s): INR in the last 72 hours. No results for input(s): Towana Ball in the last 72 hours.     Urinalysis:      Lab Results   Component Value Date    NITRU Negative 12/07/2020    WBCUA 0-2 12/07/2020    BACTERIA RARE 12/07/2020    RBCUA NEG 02/21/2012    BLOODU Trace-intact 12/07/2020    SPECGRAV 1.015 12/07/2020    GLUCOSEU Negative 12/07/2020    GLUCOSEU NEG 02/21/2012       Radiology:  No orders to display           Assessment/Plan:    Active Hospital Problems    Diagnosis Date Noted    CHF (congestive heart failure), NYHA class I, acute on chronic[facility] [I50.43] 12/11/2020         DVT Prophylaxis: xarelto  Diet: DIET GENERAL; Carb Control: 4 carb choices (60 gms)/meal; No Added Salt (3-4 GM)  Code Status: Full Code    PT/OT Eval Status: done    Dispo - Bilateral DVT, non occlusive- full anticoagulation  Legs pain/edema/redness- improved with current Tx  New diagnosed CHF -EF 20%, changed IV lasix to PO yesterday, cardiology on case   HTN- controlled  DM-hyperglycemia due to steroids injection- better with lantus, follow up with ACCU check   Chronic constipation,  follow up clinically, add lactulose BID  cronic bilateral legs skin changes/lymphedema-better with lasix  Morbid obesity with BMI 52%- supportive care  [facility] signed by Rosanna Aguilar MD on 12/13/2020 at 7:07 AM

## 2020-12-13 NOTE — PROGRESS NOTES
Physical Therapy  Facility/Department: [facility] MED SURG UNIT  Daily Treatment Note  NAME: Deandra Cooley  : 1945  MRN: [id number]    Date of Service: 2020    Discharge Recommendations:  Home with Home health PT, Home with assist PRN        Assessment   Body structures, Functions, Activity limitations: Decreased functional mobility ; Decreased ROM; Decreased strength;Decreased endurance; Increased pain;Decreased posture;Decreased sensation  Assessment: Patinet able to perform standing hip strengthening exercises without increase of pain. Treatment Diagnosis: Decreased mobiility and LE weakness post BLE DVT  Prognosis: Good  Decision Making: Medium Complexity  REQUIRES PT FOLLOW UP: Yes  Activity Tolerance  Activity Tolerance: Patient Tolerated treatment well;Patient limited by pain; Patient limited by endurance     Patient Diagnosis(es): There were no encounter diagnoses. has a past medical history of Cancer ([address] Hyperlipidemia, Hypertension, Osteoarthritis, and Type II or unspecified type diabetes mellitus without mention of complication, not stated as uncontrolled. has a past surgical history that includes hernia repair; Hysterectomy; Breast surgery; and Bladder surgery. Restrictions  Restrictions/Precautions  Restrictions/Precautions: Contact Precautions  Subjective   General  Chart Reviewed: Yes  Additional Pertinent Hx: Pt admitted due to DVT B LE - Cleared with Dr Zak crowder for PT/OT to work with pt  Family / Caregiver Present: No  Referring Practitioner: Dr Hali Welsh: Patient reports no current issues  General Comment  Comments: Sleeps at sister's house in recliner, work station at patients [place] to be virtual .  Pt reports unable to get into her own bed presently; lymphedema tx in BLE and LUE  Pain Screening  Patient Currently in Pain: Yes  Pain Assessment  Pain Assessment: 0-10  Pain Level: 0  Clinical Progression: Not changed  Vital Signs Patient Currently in Pain: Yes       Orientation     Cognition      Objective      Transfers  Sit to Stand: Stand by assistance  Stand to sit: Stand by assistance  Ambulation 1  Surface: level tile  Device: Rollator  Assistance: Contact guard assistance  Quality of Gait: Intermittent Trendelenberg,   Distance: 80'  Stairs/Curb  Stairs?: No     Balance  Sitting - Static: Good  Sitting - Dynamic: Good  Standing - Static: Fair  Standing - Dynamic: Fair;-  Exercises  Gluteal Sets: x20   Hip Flexion: March in seated x 10  Hip Abduction: x 20  Knee Long Arc Quad: x10 hold 3 sec   Ankle Pumps: x20   Other exercises  Other exercises?: Yes  Other exercises 1: Standing hip flexion x 10  Other exercises 2: Standing hip abduction x 10  Other exercises 3: Standing hip extension x 10  Other exercises 4: STS x 5                        G-Code     OutComes Score                                                     AM-PAC Score             Goals  Short term goals  Time Frame for Short term goals: 3-5 days  Short term goal 1: supervised transfer with Rollator, no vc for safety   Short term goal 2: tolerate 2x5 lifting leg onto 10 inch stool  to simulate LE into car  Short term goal 3: Amb >= 120 ft with ROllator before fatigued  Short term goal 4: Assess 2 steps ^inch with 2 rails and <= Deedee marked time  Short term goal 5: Tolerate 2x10 sweated JOSUÉ to gain strength and endurance for function  Long term goals  Time Frame for Long term goals : 7-10 days  Long term goal 1: transfers modified independent   Long term goal 2: sit to supine in bed with sheet leg  with <= Min A   Long term goal 3: Amb >= 150ft with least AD before fatigued, less Trendeelenberg gait , more upright posture  Long term goal 4: 2 stairs with 2 rails with CGA   Long term goal 5: HEP in place for discharge; inc LE strength any amount to achieve functional goals   Patient Goals   Patient goals :  To get stronger to return home    Plan    Plan Times per week: 5-7 days  Times per day: (1-2x per day)  Plan weeks: (1.5 wks)  Current Treatment Recommendations: Strengthening, Balance Training, Transfer Training, Endurance Training, Gait Training, Stair training  Safety Devices  Type of devices:  All fall risk precautions in place, Call light within reach, Chair alarm in place, Gait belt, Left in chair     Therapy Time   Individual Concurrent Group Co-treatment   Time In  1030         Time Out  1115         Minutes  [id number] [address] PTA  License and [address] Number: [id number]

## 2020-12-14 LAB
ANION GAP SERPL CALCULATED.3IONS-SCNC: 11 MEQ/L (ref 9–15)
BASOPHILS ABSOLUTE: 0 K/UL (ref 0–0.2)
BASOPHILS RELATIVE PERCENT: 0.5 %
BUN BLDV-MCNC: 43 MG/DL (ref 8–23)
CALCIUM SERPL-MCNC: 9.3 MG/DL (ref 8.5–9.9)
CHLORIDE BLD-SCNC: 97 MEQ/L (ref 95–107)
CO2: 30 MEQ/L (ref 20–31)
CREAT SERPL-MCNC: 0.78 MG/DL (ref 0.5–0.9)
EOSINOPHILS ABSOLUTE: 0.2 K/UL (ref 0–0.7)
EOSINOPHILS RELATIVE PERCENT: 3.5 %
GFR AFRICAN AMERICAN: >60
GFR NON-AFRICAN AMERICAN: >60
GLUCOSE BLD-MCNC: 121 MG/DL (ref 60–115)
GLUCOSE BLD-MCNC: 145 MG/DL (ref 60–115)
GLUCOSE BLD-MCNC: 155 MG/DL (ref 70–99)
GLUCOSE BLD-MCNC: 156 MG/DL (ref 60–115)
GLUCOSE BLD-MCNC: 157 MG/DL (ref 60–115)
HCT VFR BLD CALC: 38 % (ref 37–47)
HEMOGLOBIN: 12.1 G/DL (ref 12–16)
LYMPHOCYTES ABSOLUTE: 1.7 K/UL (ref 1–4.8)
LYMPHOCYTES RELATIVE PERCENT: 28.4 %
MCH RBC QN AUTO: 29.5 PG (ref 27–31.3)
MCHC RBC AUTO-ENTMCNC: 31.8 % (ref 33–37)
MCV RBC AUTO: 92.7 FL (ref 82–100)
MONOCYTES ABSOLUTE: 0.8 K/UL (ref 0.2–0.8)
MONOCYTES RELATIVE PERCENT: 13.2 %
NEUTROPHILS ABSOLUTE: 3.2 K/UL (ref 1.4–6.5)
NEUTROPHILS RELATIVE PERCENT: 54.4 %
PDW BLD-RTO: 15.6 % (ref 11.5–14.5)
PERFORMED ON: ABNORMAL
PLATELET # BLD: 251 K/UL (ref 130–400)
POTASSIUM SERPL-SCNC: 3.8 MEQ/L (ref 3.4–4.9)
RBC # BLD: 4.1 M/UL (ref 4.2–5.4)
SODIUM BLD-SCNC: 138 MEQ/L (ref 135–144)
WBC # BLD: 5.9 K/UL (ref 4.8–10.8)

## 2020-12-14 PROCEDURE — 6370000000 HC RX 637 (ALT 250 FOR IP): Performed by: INTERNAL MEDICINE

## 2020-12-14 PROCEDURE — 97530 THERAPEUTIC ACTIVITIES: CPT

## 2020-12-14 PROCEDURE — 97110 THERAPEUTIC EXERCISES: CPT

## 2020-12-14 PROCEDURE — 97116 GAIT TRAINING THERAPY: CPT

## 2020-12-14 PROCEDURE — 85025 COMPLETE CBC W/AUTO DIFF WBC: CPT

## 2020-12-14 PROCEDURE — 36415 COLL VENOUS BLD VENIPUNCTURE: CPT

## 2020-12-14 PROCEDURE — 2580000003 HC RX 258: Performed by: INTERNAL MEDICINE

## 2020-12-14 PROCEDURE — 97535 SELF CARE MNGMENT TRAINING: CPT

## 2020-12-14 PROCEDURE — 80048 BASIC METABOLIC PNL TOTAL CA: CPT

## 2020-12-14 PROCEDURE — 1200000002 HC SEMI PRIVATE SWING BED

## 2020-12-14 RX ORDER — ALPRAZOLAM 0.25 MG/1
0.25 TABLET ORAL 3 TIMES DAILY PRN
Status: DISCONTINUED | OUTPATIENT
Start: 2020-12-14 | End: 2020-12-16

## 2020-12-14 RX ORDER — ALPRAZOLAM 0.25 MG/1
0.25 TABLET ORAL ONCE
Status: COMPLETED | OUTPATIENT
Start: 2020-12-14 | End: 2020-12-14

## 2020-12-14 RX ADMIN — ALPRAZOLAM 0.25 MG: 0.25 TABLET ORAL at 17:49

## 2020-12-14 RX ADMIN — RIVAROXABAN 15 MG: 15 TABLET, FILM COATED ORAL at 08:37

## 2020-12-14 RX ADMIN — RIVAROXABAN 15 MG: 15 TABLET, FILM COATED ORAL at 17:51

## 2020-12-14 RX ADMIN — MICONAZOLE NITRATE: 20 POWDER TOPICAL at 23:07

## 2020-12-14 RX ADMIN — POLYETHYLENE GLYCOL 3350 17 G: 17 POWDER, FOR SOLUTION ORAL at 08:37

## 2020-12-14 RX ADMIN — INSULIN GLARGINE 20 UNITS: 100 INJECTION, SOLUTION SUBCUTANEOUS at 20:02

## 2020-12-14 RX ADMIN — ACETAMINOPHEN 650 MG: 325 TABLET ORAL at 03:01

## 2020-12-14 RX ADMIN — HYDROCHLOROTHIAZIDE 25 MG: 25 TABLET ORAL at 08:37

## 2020-12-14 RX ADMIN — CARVEDILOL 25 MG: 25 TABLET, FILM COATED ORAL at 08:38

## 2020-12-14 RX ADMIN — ASPIRIN 81 MG: 81 TABLET, COATED ORAL at 08:37

## 2020-12-14 RX ADMIN — FUROSEMIDE 40 MG: 40 TABLET ORAL at 08:37

## 2020-12-14 RX ADMIN — POTASSIUM CHLORIDE 20 MEQ: 750 TABLET, EXTENDED RELEASE ORAL at 08:37

## 2020-12-14 RX ADMIN — Medication 10 ML: at 08:35

## 2020-12-14 RX ADMIN — CARVEDILOL 25 MG: 25 TABLET, FILM COATED ORAL at 17:49

## 2020-12-14 RX ADMIN — MICONAZOLE NITRATE: 20 POWDER TOPICAL at 08:38

## 2020-12-14 RX ADMIN — POTASSIUM CHLORIDE 20 MEQ: 750 TABLET, EXTENDED RELEASE ORAL at 19:55

## 2020-12-14 ASSESSMENT — PAIN SCALES - GENERAL
PAINLEVEL_OUTOF10: 0
PAINLEVEL_OUTOF10: 5

## 2020-12-14 ASSESSMENT — PAIN DESCRIPTION - PROGRESSION: CLINICAL_PROGRESSION: NOT CHANGED

## 2020-12-14 NOTE — PROGRESS NOTES
Hospitalist Progress Note      PCP: Teresa Parra MD    Date of Admission: 12/10/2020    Chief Complaint: anxiety    Subjective: pt in chair, looks comfortable     Medications:  Reviewed    Infusion Medications    dextrose       Scheduled Medications    ALPRAZolam  0.25 mg Oral Once    insulin glargine  20 Units Subcutaneous Nightly    furosemide  40 mg Oral Daily    sodium chloride flush  10 mL Intravenous 2 times per day    aspirin  81 mg Oral Daily    carvedilol  25 mg Oral BID WC    hydroCHLOROthiazide  25 mg Oral Daily    insulin lispro  0-12 Units Subcutaneous TID WC    insulin lispro  0-6 Units Subcutaneous Nightly    miconazole   Topical BID    polyethylene glycol  17 g Oral BID    potassium chloride  20 mEq Oral BID    rivaroxaban  15 mg Oral BID WC    senna  1 tablet Oral Nightly     PRN Meds: ALPRAZolam, acetaminophen **OR** acetaminophen, polyethylene glycol, promethazine **OR** ondansetron, sodium chloride flush, dextrose, dextrose, glucagon (rDNA), glucose, acetaminophen, albuterol      Intake/Output Summary (Last 24 hours) at 12/14/2020 1548  Last data filed at 12/14/2020 1410  Gross per 24 hour   Intake 670 ml   Output 350 ml   Net 320 ml       Exam:    /74   Pulse 69   Temp 97.2 °F (36.2 °C)   Resp 16   Ht 5' (1.524 m)   Wt 263 lb 3.2 oz (119.4 kg)   SpO2 97%   BMI 51.40 kg/m²     General appearance: No apparent distress, appears stated age and cooperative. HEENT: Pupils equal, round, and reactive to light. Conjunctivae/corneas clear. Neck: Supple, with full range of motion. No jugular venous distention. Trachea midline. Respiratory:  Normal respiratory effort. Clear to auscultation, bilaterally without Rales/Wheezes/Rhonchi. Cardiovascular: Regular rate and rhythm with normal S1/S2 without murmurs, rubs or gallops. Abdomen: Soft, non-tender, non-distended with normal bowel sounds. Musculoskeletal:  edema bilaterally.     Skin:chronic skin changes lower legs Neurologic:  Neurovascularly intact without any focal sensory/motor deficits. Cranial nerves: II-XII intact, grossly non-focal.  Psychiatric: Alert and oriented, thought content appropriate, normal insight      Labs:   Recent Labs     12/14/20  0848   WBC 5.9   HGB 12.1   HCT 38.0        Recent Labs     12/12/20  0715 12/14/20  0848   * 138   K 4.6 3.8   CL 95 97   CO2 27 30   BUN 43* 43*   CREATININE 0.84 0.78   CALCIUM 9.4 9.3     No results for input(s): AST, ALT, BILIDIR, BILITOT, ALKPHOS in the last 72 hours. No results for input(s): INR in the last 72 hours. No results for input(s): Armenian Bobo in the last 72 hours.     Urinalysis:      Lab Results   Component Value Date    NITRU Negative 12/07/2020    WBCUA 0-2 12/07/2020    BACTERIA RARE 12/07/2020    RBCUA NEG 02/21/2012    BLOODU Trace-intact 12/07/2020    SPECGRAV 1.015 12/07/2020    GLUCOSEU Negative 12/07/2020    GLUCOSEU NEG 02/21/2012       Radiology:  No orders to display           Assessment/Plan:    Active Hospital Problems    Diagnosis Date Noted    CHF (congestive heart failure), NYHA class I, acute on chronic, combined ([facility] [I50.43] 12/11/2020         DVT Prophylaxis: xarelto  Diet: DIET GENERAL; Carb Control: 4 carb choices (60 gms)/meal; No Added Salt (3-4 GM)  Code Status: Full Code    PT/OT Eval Status: done    Dispo - Bilateral DVT, non occlusive- full anticoagulation  Legs pain/edema/redness- improved with current Tx  New diagnosed CHF -EF 20%, changed IV lasix to PO yesterday, cardiology on case   HTN- controlled  Anxiety today- ativan PRN   DM-hyperglycemia due to steroids injection- better with lantus, follow up with ACCU check   Chronic constipation,  follow up clinically, added lactulose BID  cronic bilateral legs skin changes/lymphedema-better with lasix  Morbid obesity with BMI 52%- supportive care  Medically stable for skilled admission at [employer] Electronically signed by Nidia Munson MD on 12/14/2020 at 3:48 PM

## 2020-12-14 NOTE — PROGRESS NOTES
Times per day: Daily  Plan weeks: 7-10 days  Current Treatment Recommendations: Strengthening, ROM, Balance Training, Functional Mobility Training, Endurance Training, Safety Education & Training, Patient/Caregiver Education & Training, Self-Care / ADL, Home Management Training  G-Code     OutComes Score                                                  AM-PAC Score             Goals  Short term goals  Time Frame for Short term goals: 5-7 days  Short term goal 1: Tolerate 25-30min BUE ther ex/act to inc strength/end for ADL  Short term goal 2: SBA/Spv toileting  Short term goal 3: SBA/Spv LB dressing using AE prn as PLOF  Long term goals  Time Frame for Long term goals : 7-10 days  Long term goal 1: MI toileting  Long term goal 2: MI LB dressing and bathing using AE prn as PLOF  Long term goal 3: Inc to 5-7min stand miroslava/end for inc safety and I with ADL  Long term goal 4: I BUE HEP  Patient Goals   Patient goals :  \"To be independent around my house\"       Therapy Time   Individual Concurrent Group Co-treatment   Time In  12:35pm         Time Out  1:35pm         Minutes  [facility]  Number: [id number]

## 2020-12-14 NOTE — PROGRESS NOTES
Physical Therapy  Facility/Department: [facility] MED SURG UNIT  Daily Treatment Note  NAME: [facility]  : 1945  MRN: [id number]    Date of Service: 2020    Discharge Recommendations:  Home with Home health PT, Home with assist PRN        Assessment   Body structures, Functions, Activity limitations: Decreased functional mobility ; Decreased ROM; Decreased strength;Decreased endurance; Increased pain;Decreased posture;Decreased sensation  Assessment: With use of leg  attempted supine <>sit transfers x 2 able to dec assist to Min A with practice and use of leg  limited by weakness and trunk mobility. Also performed inc distance with giat trials intermittent seted RB on rollator with good safety. Min A descending and not wanting to bend knees audible crepitus when does and pain up to 5/10. Treatment Diagnosis: Decreased mobiility and LE weakness post BLE DVT  Prognosis: Good  REQUIRES PT FOLLOW UP: Yes  Activity Tolerance  Activity Tolerance: Patient Tolerated treatment well;Patient limited by pain; Patient limited by endurance     Patient Diagnosis(es): There were no encounter diagnoses. has a past medical history of Cancer ([address] Hyperlipidemia, Hypertension, Osteoarthritis, and Type II or unspecified type diabetes mellitus without mention of complication, not stated as uncontrolled. has a past surgical history that includes hernia repair; Hysterectomy; Breast surgery; and Bladder surgery. Restrictions  Restrictions/Precautions  Restrictions/Precautions: Contact Precautions  Subjective   General  Chart Reviewed: Yes  Additional Pertinent Hx: Pt admitted due to DVT B LE - Cleared with Dr Nicky crowder for PT/OT to work with pt  Family / Caregiver Present: No  Referring Practitioner: Dr Nicky Valdez  Subjective  Subjective: Pt. sitting up in recliner and denies pain. Pt. is agreeable to PT session.             Orientation     Cognition      Objective    Bed mobility Supine to Sit SBA bed falt use of rail   Sit to SUpine x 2 attempts mod A dec to min A with use of leg  and demo. Transfers  Sit to Stand: Stand by assistance  Stand to sit: Stand by assistance  Ambulation  Ambulation?: Yes  Ambulation 1  Surface: level tile  Device: Rollator  Assistance: Stand by assistance;Contact guard assistance  Quality of Gait: Intermittent Trendelenberg,   Distance: 110', 70' and 50'   Comments: seated RB in between gait trails SPO2 at lowest 93% on room air. Mild SOB VC for inc hip and knee flexion   Stairs/Curb  Stairs?: Yes  Stairs  # Steps : 3  Stairs Height: 4\"  Rails: Bilateral  Device: No Device  Assistance: Minimal assistance  Comment: VC for sequencing Min A descending and VC to bend B knees audible crepitus when flexes B knees inc B knee pain 5/10. Balance  Sitting - Static: Good  Sitting - Dynamic: Good  Standing - Static: Fair  Standing - Dynamic: Fair;-  Exercises  Heelslides: seated in chair x 10 hold 5 sec educated gentle heelslides prior to mobility   Hip Flexion: standing march  x 9 hold 3 sec alt \"thats it\"   Hip Abduction: seated hip abd GTB demoed and instructed   Other exercises  Other exercises 2: standing at rollator step out and in x 5 ea                         G-Code     OutComes Score                                                     AM-PAC Score             Goals  Short term goals  Time Frame for Short term goals: 3-5 days  Short term goal 1: supervised transfer with Rollator, no vc for safety   Short term goal 2: tolerate 2x5 lifting leg onto 10 inch stool  to simulate LE into car  Short term goal 3: Amb >= 120 ft with ROllator before fatigued  Short term goal 4: Assess 2 steps ^inch with 2 rails and <= Deedee marked time(GOA L MET )  Short term goal 5:  Tolerate 2x10 sweated JOSUÉ to gain strength and endurance for function  Long term goals  Time Frame for Long term goals : 7-10 days  Long term goal 1: transfers modified independent Long term goal 2: sit to supine in bed with sheet leg  with <= Min A   Long term goal 3: Amb >= 150ft with least AD before fatigued, less Trendeelenberg gait , more upright posture  Long term goal 4: 2 stairs with 2 rails with CGA   Long term goal 5: HEP in place for discharge; inc LE strength any amount to achieve functional goals   Patient Goals   Patient goals : To get stronger to return home    Plan    Plan  Times per week: 5-7 days  Times per day: (1-2x per day)  Plan weeks: (1.5 wks)  Current Treatment Recommendations: Strengthening, Balance Training, Transfer Training, Endurance Training, Gait Training, Stair training  Safety Devices  Type of devices:  All fall risk precautions in place, Call light within reach, Chair alarm in place, Gait belt, Left in chair     Therapy Time   Individual Concurrent Group Co-treatment   Time In  830         Time Out  [address] [facility],  [address], [place]  License and [address] Number: [id number]

## 2020-12-14 NOTE — PROGRESS NOTES
Chart reviewed. Patient's care discussed in daily quality rounds. Patient continues to receive skilled therapies at [facility]. This  met with patient to review and schedule care conference. Meeting scheduled for this Wednesday 12/16/20. This  followed up with [facility] desired. Patient reports no [facility] agency has been identified at this time and will notify SS when [facility] is desired.   SS to continue to follow

## 2020-12-15 LAB
GLUCOSE BLD-MCNC: 153 MG/DL (ref 60–115)
GLUCOSE BLD-MCNC: 157 MG/DL (ref 60–115)
GLUCOSE BLD-MCNC: 199 MG/DL (ref 60–115)
GLUCOSE BLD-MCNC: 207 MG/DL (ref 60–115)
PERFORMED ON: ABNORMAL

## 2020-12-15 PROCEDURE — 6370000000 HC RX 637 (ALT 250 FOR IP): Performed by: INTERNAL MEDICINE

## 2020-12-15 PROCEDURE — 1200000002 HC SEMI PRIVATE SWING BED

## 2020-12-15 PROCEDURE — 97116 GAIT TRAINING THERAPY: CPT

## 2020-12-15 PROCEDURE — 97535 SELF CARE MNGMENT TRAINING: CPT

## 2020-12-15 PROCEDURE — 97110 THERAPEUTIC EXERCISES: CPT

## 2020-12-15 PROCEDURE — 97530 THERAPEUTIC ACTIVITIES: CPT

## 2020-12-15 RX ADMIN — FUROSEMIDE 40 MG: 40 TABLET ORAL at 08:33

## 2020-12-15 RX ADMIN — CARVEDILOL 25 MG: 25 TABLET, FILM COATED ORAL at 08:33

## 2020-12-15 RX ADMIN — CARVEDILOL 25 MG: 25 TABLET, FILM COATED ORAL at 17:17

## 2020-12-15 RX ADMIN — POTASSIUM CHLORIDE 20 MEQ: 750 TABLET, EXTENDED RELEASE ORAL at 19:45

## 2020-12-15 RX ADMIN — INSULIN GLARGINE 20 UNITS: 100 INJECTION, SOLUTION SUBCUTANEOUS at 19:47

## 2020-12-15 RX ADMIN — RIVAROXABAN 15 MG: 15 TABLET, FILM COATED ORAL at 08:33

## 2020-12-15 RX ADMIN — POLYETHYLENE GLYCOL 3350 17 G: 17 POWDER, FOR SOLUTION ORAL at 11:57

## 2020-12-15 RX ADMIN — MICONAZOLE NITRATE: 20 POWDER TOPICAL at 09:27

## 2020-12-15 RX ADMIN — POTASSIUM CHLORIDE 20 MEQ: 750 TABLET, EXTENDED RELEASE ORAL at 08:33

## 2020-12-15 RX ADMIN — ASPIRIN 81 MG: 81 TABLET, COATED ORAL at 08:33

## 2020-12-15 RX ADMIN — HYDROCHLOROTHIAZIDE 25 MG: 25 TABLET ORAL at 08:33

## 2020-12-15 RX ADMIN — RIVAROXABAN 15 MG: 15 TABLET, FILM COATED ORAL at 17:17

## 2020-12-15 ASSESSMENT — PAIN DESCRIPTION - PROGRESSION
CLINICAL_PROGRESSION: NOT CHANGED

## 2020-12-15 NOTE — PROGRESS NOTES
Physical Therapy  Facility/Department: [facility] MED SURG UNIT  Daily Treatment Note  NAME: Jennyfer Flores  : 1945  MRN: [id number]    Date of Service: 12/15/2020    Discharge Recommendations:  Home with Home health PT, Home with assist PRN        Assessment   Body structures, Functions, Activity limitations: Decreased functional mobility ; Decreased ROM; Decreased strength;Decreased endurance; Increased pain;Decreased posture;Decreased sensation  Assessment: Time spent focusing functional mobility and ease of gettign in/out of bed with leg . Pt. requires mod A this date with leg  to get legs into bed partially due to lack of mobilty and strength. Therex focused on BLE, core and trunk mobilit/strength to sdvance function. VC throughout session not to hold breath and try to coordinate breath with activity. Pt. however able to ambulate inc distances with focus on PLB. Pain in L knee post 4/10 issued CP to dec pain. Pt. motivated with therapy. Next session trial small step 2\" to 4\" to help sit back onto bed further as bed tall for pt. height. Treatment Diagnosis: Decreased mobiility and LE weakness post BLE DVT  Prognosis: Good  REQUIRES PT FOLLOW UP: Yes  Activity Tolerance  Activity Tolerance: Patient Tolerated treatment well;Patient limited by pain; Patient limited by endurance     Patient Diagnosis(es): There were no encounter diagnoses. has a past medical history of Cancer ([address] Hyperlipidemia, Hypertension, Osteoarthritis, and Type II or unspecified type diabetes mellitus without mention of complication, not stated as uncontrolled. has a past surgical history that includes hernia repair; Hysterectomy; Breast surgery; and Bladder surgery. Restrictions  Restrictions/Precautions  Restrictions/Precautions: Contact Precautions  Subjective   General  Chart Reviewed:  Yes  Additional Pertinent Hx: Pt admitted due to DVT B LE - Cleared with Dr Curtis Kanner - okay for PT/OT to work with pt Family / Caregiver Present: No  Referring Practitioner: Dr Cesar Goodman  Subjective  Subjective: Pt. up in recliner agreeable to PT session. Pt .states she fell asleep in recliner due to taking medication for anxiousness. \"It put me right to sleep. \"           Orientation     Cognition      Objective    Bed Mobility   Rolling:SBA  Supine to Sit: SBA flat bed and use of bed rail  Sit to Supine: Mod A use of leg  and bed rail inc time and effort   Transfers  Sit to Stand: Stand by assistance  Stand to sit: Stand by assistance  Ambulation  Ambulation?: Yes  Ambulation 1  Surface: level tile  Device: Rollator  Assistance: Stand by assistance;Contact guard assistance  Quality of Gait: Intermittent standing RB to focus on dec trendelenburg and inc bilat hip and knee flesion with dec postural sway. Distance: 130' x 2   Comments: Intermittent standing RB for improved postureand bilat step height, unable to maintain SPO2 95% good follow trhough of PLB with focus and cues. Stairs/Curb  Stairs?: No     Balance  Sitting - Static: Good  Sitting - Dynamic: Good  Standing - Static: Fair  Standing - Dynamic: Fair;-  Exercises  Bridging: (x 5 mini bridge VC not to hold breath )  Straight Leg Raise: x 5 bilat VC for QS and not to hold breath   Hamstring Sets: seated HS curl GTB x 10 hold 5 sec inc pain L knee able to complete set but no further reps.   Heelslides: seated in chair x 10 hold 5 sec educated gentle heelslides prior to mobility, supine x 10-15 reps with leg  and slide board   Gluteal Sets: seated x 10 hold 5 sec   Hip Abduction: seated hip abduction 2 x10 hold 5 sec GTB   Knee Long Arc Quad: x 10 bilat hold 5 sec   Ankle Pumps: x 20  Other exercises  Other exercises 1: LTR x 10 hold 5 sec   Other exercises 2: Rolling with oblique engagement x 5 ea direction VC not to hold breath                         G-Code     OutComes Score                                                     AM-PAC Score             Goals Short term goals  Time Frame for Short term goals: 3-5 days  Short term goal 1: supervised transfer with Rollator, no vc for safety   Short term goal 2: tolerate 2x5 lifting leg onto 10 inch stool  to simulate LE into car  Short term goal 3: Amb >= 120 ft with ROllator before fatigued  Short term goal 4: Assess 2 steps ^inch with 2 rails and <= Deedee marked time(GOA L MET )  Short term goal 5: Tolerate 2x10 sweated JOSUÉ to gain strength and endurance for function  Long term goals  Time Frame for Long term goals : 7-10 days  Long term goal 1: transfers modified independent   Long term goal 2: sit to supine in bed with sheet leg  with <= Min A   Long term goal 3: Amb >= 150ft with least AD before fatigued, less Trendeelenberg gait , more upright posture  Long term goal 4: 2 stairs with 2 rails with CGA   Long term goal 5: HEP in place for discharge; inc LE strength any amount to achieve functional goals   Patient Goals   Patient goals : To get stronger to return home    Plan    Plan  Times per week: 5-7 days  Times per day: (1-2x per day)  Plan weeks: (1.5 wks)  Current Treatment Recommendations: Strengthening, Balance Training, Transfer Training, Endurance Training, Gait Training, Stair training  Safety Devices  Type of devices:  All fall risk precautions in place, Call light within reach, Chair alarm in place, Gait belt, Left in chair     Therapy Time   Individual Concurrent Group Co-treatment   Time In  835         Time Out  950         Minutes  [facility]  [facility] Number: [id number]

## 2020-12-15 NOTE — PROGRESS NOTES
Other: to inc strength/end for ADL(RB between each set)         Toilet transfer-CGA  Toileting-Max A for backside  Pt. Tolerated standing for 5 minutes with rollator 2 x           Plan   Plan  Times per week: 3-6x/wk  Times per day: Daily  Plan weeks: 7-10 days  Current Treatment Recommendations: Strengthening, ROM, Balance Training, Functional Mobility Training, Endurance Training, Safety Education & Training, Patient/Caregiver Education & Training, Self-Care / ADL, Home Management Training  G-Code     OutComes Score                                                  AM-PAC Score             Goals  Short term goals  Time Frame for Short term goals: 5-7 days  Short term goal 1: Tolerate 25-30min BUE ther ex/act to inc strength/end for ADL  Short term goal 2: SBA/Spv toileting  Short term goal 3: SBA/Spv LB dressing using AE prn as PLOF  Long term goals  Time Frame for Long term goals : 7-10 days  Long term goal 1: MI toileting  Long term goal 2: MI LB dressing and bathing using AE prn as PLOF  Long term goal 3: Inc to 5-7min stand miroslava/end for inc safety and I with ADL  Long term goal 4: I BUE HEP  Patient Goals   Patient goals :  \"To be independent around my house\"       Therapy Time   Individual Concurrent Group Co-treatment   Time In  10:40am         Time Out  11:05am         Minutes  [address] Number: [id number]

## 2020-12-16 LAB
GLUCOSE BLD-MCNC: 129 MG/DL (ref 60–115)
GLUCOSE BLD-MCNC: 172 MG/DL (ref 60–115)
GLUCOSE BLD-MCNC: 202 MG/DL (ref 60–115)
GLUCOSE BLD-MCNC: 248 MG/DL (ref 60–115)
PERFORMED ON: ABNORMAL

## 2020-12-16 PROCEDURE — 97110 THERAPEUTIC EXERCISES: CPT

## 2020-12-16 PROCEDURE — 97530 THERAPEUTIC ACTIVITIES: CPT

## 2020-12-16 PROCEDURE — 97535 SELF CARE MNGMENT TRAINING: CPT

## 2020-12-16 PROCEDURE — 97116 GAIT TRAINING THERAPY: CPT

## 2020-12-16 PROCEDURE — 1200000002 HC SEMI PRIVATE SWING BED

## 2020-12-16 PROCEDURE — 6370000000 HC RX 637 (ALT 250 FOR IP): Performed by: INTERNAL MEDICINE

## 2020-12-16 RX ORDER — ALPRAZOLAM 0.25 MG/1
0.12 TABLET ORAL 3 TIMES DAILY PRN
Status: DISCONTINUED | OUTPATIENT
Start: 2020-12-16 | End: 2020-12-17 | Stop reason: HOSPADM

## 2020-12-16 RX ADMIN — ALPRAZOLAM 0.12 MG: 0.25 TABLET ORAL at 23:06

## 2020-12-16 RX ADMIN — CARVEDILOL 25 MG: 25 TABLET, FILM COATED ORAL at 17:09

## 2020-12-16 RX ADMIN — FUROSEMIDE 40 MG: 40 TABLET ORAL at 09:51

## 2020-12-16 RX ADMIN — RIVAROXABAN 15 MG: 15 TABLET, FILM COATED ORAL at 17:09

## 2020-12-16 RX ADMIN — POTASSIUM CHLORIDE 20 MEQ: 750 TABLET, EXTENDED RELEASE ORAL at 09:51

## 2020-12-16 RX ADMIN — MICONAZOLE NITRATE: 20 POWDER TOPICAL at 20:22

## 2020-12-16 RX ADMIN — POLYETHYLENE GLYCOL 3350 17 G: 17 POWDER, FOR SOLUTION ORAL at 09:50

## 2020-12-16 RX ADMIN — ASPIRIN 81 MG: 81 TABLET, COATED ORAL at 09:51

## 2020-12-16 RX ADMIN — HYDROCHLOROTHIAZIDE 25 MG: 25 TABLET ORAL at 09:51

## 2020-12-16 RX ADMIN — RIVAROXABAN 15 MG: 15 TABLET, FILM COATED ORAL at 09:50

## 2020-12-16 RX ADMIN — MICONAZOLE NITRATE: 20 POWDER TOPICAL at 09:52

## 2020-12-16 RX ADMIN — CARVEDILOL 25 MG: 25 TABLET, FILM COATED ORAL at 09:51

## 2020-12-16 RX ADMIN — POTASSIUM CHLORIDE 20 MEQ: 750 TABLET, EXTENDED RELEASE ORAL at 20:21

## 2020-12-16 ASSESSMENT — PAIN DESCRIPTION - PROGRESSION
CLINICAL_PROGRESSION: NOT CHANGED

## 2020-12-16 ASSESSMENT — PAIN SCALES - GENERAL: PAINLEVEL_OUTOF10: 0

## 2020-12-16 NOTE — PROGRESS NOTES
Physical Therapy  Facility/Department: [facility] MED SURG UNIT  Daily Treatment Note  NAME: Tom Fink  : 1945  MRN: [id number]    Date of Service: 2020    Discharge Recommendations:  Home with Home health PT, Home with assist PRN        Assessment   Body structures, Functions, Activity limitations: Decreased functional mobility ; Decreased ROM; Decreased strength;Decreased endurance; Increased pain;Decreased posture;Decreased sensation  Assessment: Focused on gait ans dtair training this session. Inc confidence performing stairs sideways use of one HRas inc crepitus and lack of knee flexion on LLE descending forward. Pt. defers therex due to fatigue but did practice foot taps L and R for inc knee and hip flexion at 6\" step. Pt. defered therex due to fatigue. Treatment Diagnosis: Decreased mobiility and LE weakness post BLE DVT  Prognosis: Good  REQUIRES PT FOLLOW UP: Yes  Activity Tolerance  Activity Tolerance: Patient limited by fatigue;Patient limited by endurance     Patient Diagnosis(es): There were no encounter diagnoses. has a past medical history of [address] [address] Hyperlipidemia, Hypertension, Osteoarthritis, and Type II or unspecified type diabetes mellitus without mention of complication, not stated as uncontrolled. has a past surgical history that includes hernia repair; Hysterectomy; Breast surgery; and Bladder surgery. Restrictions  Restrictions/Precautions  Restrictions/Precautions: Contact Precautions  Subjective   General  Chart Reviewed: Yes  Additional Pertinent Hx: Pt admitted due to DVT B LE - Cleared with Dr Cesar crowder for PT/OT to work with pt  Family / Caregiver Present: No  Referring Practitioner: Dr Cesar Goodman  Subjective  Subjective: Pt. sitting up in recliner at onset of session agreeable to PT session. Pt. denies pain.             Orientation     Cognition      Objective      Transfers  Sit to Stand: Stand by assistance  Stand to sit: Stand by assistance  Ambulation Ambulation?: Yes  Ambulation 1  Surface: level tile  Device: Rollator  Assistance: Stand by assistance  Quality of Gait: trendelenburg pattern with cues for inc hip and knee flexion limited carryover   Distance: 110' x 2  Stairs/Curb  Stairs?: Yes  Stairs  # Steps : 3(two trials )  Stairs Height: 4\"  Rails: Right ascending;Bilateral  Device: No Device  Assistance: Minimal assistance  Comment: 2 trials one HR descending laterally second attempt B HR descending forward with L down lead. Balance  Sitting - Static: Good  Sitting - Dynamic: Good  Standing - Static: Fair  Standing - Dynamic: Fair;-  Other exercises  Other exercises 1: step up L and R foot tap on 6\" step use of B HR support VC not to circumduct                         G-Code     OutComes Score                                                     AM-PAC Score             Goals  Short term goals  Time Frame for Short term goals: 3-5 days  Short term goal 1: supervised transfer with Rollator, no vc for safety   Short term goal 2: tolerate 2x5 lifting leg onto 10 inch stool  to simulate LE into car  Short term goal 3: Amb >= 120 ft with ROllator before fatigued  Short term goal 4: Assess 2 steps ^inch with 2 rails and <= Deedee marked time(GOA L MET )  Short term goal 5: Tolerate 2x10 sweated JOSUÉ to gain strength and endurance for function  Long term goals  Time Frame for Long term goals : 7-10 days  Long term goal 1: transfers modified independent   Long term goal 2: sit to supine in bed with sheet leg  with <= Min A   Long term goal 3: Amb >= 150ft with least AD before fatigued, less Trendeelenberg gait , more upright posture  Long term goal 4: 2 stairs with 2 rails with CGA   Long term goal 5: HEP in place for discharge; inc LE strength any amount to achieve functional goals   Patient Goals   Patient goals :  To get stronger to return home    Plan    Plan  Times per week: 5-7 days  Times per day: (1-2x per day)  Plan weeks: (1.5 wks)

## 2020-12-16 NOTE — PROGRESS NOTES
Physical Therapy   Subacute Daily Treatment Note  NAME: Delio Guthrie  : 1945  MRN: [id number]    Date of Service: 2020    Patient Diagnosis(es):   Patient Active Problem List    Diagnosis Date Noted    CHF (congestive heart failure), NYHA class I, acute on chronic, combined ([facility] 2020    D[facility], distal, [facility]    PAD (peripheral artery disease) ([facility] 2019    Venous stasis ulcer of right calf with fat layer exposed without varicose veins ([facility] 2019    Skin growth 05/15/2019    Lymphedema of both lower extremities 2019    Elephantiasis nostra verrucosa 2019    Obesity due to excess calories 2017    Venous insufficiency (chronic) (peripheral) 2017    Venous ulcer of right leg ([facility] 2017    Edema leg 2010       Past Medical History:   Diagnosis Date    Cancer ([facility]     bladder, breast,     Hyperlipidemia     Hypertension     Osteoarthritis     Type II or unspecified type diabetes mellitus without mention of complication, not stated as uncontrolled      Past Surgical History:   Procedure Laterality Date    BLADDER SURGERY      cancer    BREAST SURGERY      HERNIA REPAIR      HYSTERECTOMY         Restrictions  Restrictions/Precautions  Restrictions/Precautions: Contact Precautions  Subjective       Objective     Pt and sister present for Interdisciplinary Care Conference this date. See separate note for full report. Pt aware that insurance has issued cut letter and discharge to home tomorrow 20 with [facility] PT, OT, and RN is planned. Educated what to expect with [facility] and to focus functional needs with PT and OT. Pt agrees. Pt has all DME needed at home. Educated to only use lift chair when really needed or it will lead to greater disability.              Assessment Making good progress towards LTG, may not reach 150 ft ambulation as insurance has cut subacute rehab with discharge scheduled for tomorrow. Discharge Recommendations:  Home with Home health PT, Home with assist PRN    Goals  Short term goals  Time Frame for Short term goals: 3-5 days  Short term goal 1: supervised transfer with Rollator, no vc for safety   Short term goal 2: tolerate 2x5 lifting leg onto 10 inch stool  to simulate LE into car  Short term goal 3: Amb >= 120 ft with ROllator before fatigued  Short term goal 4: Assess 2 steps ^inch with 2 rails and <= Deedee marked time(GOA L MET )  Short term goal 5: Tolerate 2x10 sweated JOSUÉ to gain strength and endurance for function  Long term goals  Time Frame for Long term goals : 7-10 days  Long term goal 1: transfers modified independent   Long term goal 2: sit to supine in bed with sheet leg  with <= Min A   Long term goal 3: Amb >= 150ft with least AD before fatigued, less Trendeelenberg gait , more upright posture  Long term goal 4: 2 stairs with 2 rails with CGA   Long term goal 5: HEP in place for discharge; inc LE strength any amount to achieve functional goals   Patient Goals   Patient goals : To get stronger to return home    Plan    Plan  Times per week: 5-7 days  Times per day: (1-2x per day)  Plan weeks: (1.5 wks)  Current Treatment Recommendations: Strengthening, Balance Training, Transfer Training, Endurance Training, Gait Training, Stair training  Safety Devices  Type of devices:  All fall risk precautions in place, Call light within reach, Chair alarm in place, Gait belt, Left in chair     Therapy Time   Individual Concurrent Group Co-treatment   Time In  1120         Time Out  1140         Minutes  [address], [id number]

## 2020-12-16 NOTE — DISCHARGE INSTR - COC
Continuity of Care Form    Patient Name: Verito Bowling   :  1945  MRN:  [id number]    Admit date:  12/10/2020  Discharge date:  ***    Code Status Order: Full Code   Advance Directives:   Advance Care Flowsheet Documentation       Date/Time Healthcare Directive Type of Healthcare Directive Copy in [address] Agent's Name Healthcare Agent's Phone Number    12/10/20 1815  No, patient does not have an advance directive for healthcare treatment -- -- -- -- --            Admitting Physician:  Petros Watson MD  PCP: Akilah Gaston MD    Discharging Nurse: [facility] Unit/Room#: 0202/0202-01  Discharging Unit Phone Number: ***    Emergency Contact:   Extended Emergency Contact Information  Primary Emergency Contact: Via Sushant Ginger Greene [address] [place] [address] Phone: [phone]  Work Phone: [phone]  Mobile Phone: [phone]  Relation: Brother/Sister    Past Surgical History:  Past Surgical History:   Procedure Laterality Date    BLADDER SURGERY      cancer    BREAST SURGERY      HERNIA REPAIR      HYSTERECTOMY         Immunization History: There is no immunization history on file for this patient.     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    Skin growth D49.2    Venous stasis ulcer of right calf with fat layer exposed without varicose veins (HCC) I87.2, T64.743    PAD (peripheral artery disease) (HCC) I73.9    DVT, lower extremity, distal, acute, bilateral ([facility]) I82.4Z3    CHF (congestive heart failure), NYHA class I, acute on chronic, combined ([facility] [address] I50.43       Isolation/Infection:   Isolation            Contact          Patient Infection Status Infection Onset Added Last Indicated Last Indicated By Review Planned Expiration Resolved Resolved By    MRSA 19 WOUND CULTURE        MRSA Right leg on 2019. Electronically signed by Dilip You RN on 2019 at 7:36 AM     MRSA Right leg on 2019. Electronically signed by Dilip You RN on 2019 at 8:15 AM     Resolved    INFLUENZA 19 Rapid Influenza A/B Antigens   20             Nurse Assessment:  Last Vital Signs: /74   Pulse 74   Temp 97.5 °F (36.4 °C) (Oral)   Resp 16   Ht 5' (1.524 m)   Wt 263 lb 3.2 oz (119.4 kg)   SpO2 96%   BMI 51.40 kg/m²     Last documented pain score (0-10 scale): Pain Level: 0  Last Weight:   Wt Readings from Last 1 Encounters:   20 263 lb 3.2 oz (119.4 kg)     Mental Status:  {IP PT MENTAL STATUS:93599}    IV Access:  { SOLANGE IV ACCESS:561096929}    Nursing Mobility/ADLs:  Walking   {CHP DME QMGD:227703644}  Transfer  {CHP DME JZSK:038584801}  Bathing  {CHP DME DZTF:673259402}  Dressing  {CHP DME PTQU:484819548}  Toileting  {CHP DME UJUU:943442409}  Feeding  {P DME QNYF:734408107}  Med Admin  {P DME QWCK:600382080}  Med Delivery   { SOLANGE MED Delivery:460541449}    Wound Care Documentation and Therapy:        Elimination:  Continence: Bowel: {YES / DU:47045}  Bladder: {YES / UU:95874}  Urinary Catheter: {Urinary Catheter:765843742}   Colostomy/Ileostomy/Ileal Conduit: {YES / UJ:13833}       Date of Last BM: ***    Intake/Output Summary (Last 24 hours) at 2020 1740  Last data filed at 2020 0856  Gross per 24 hour   Intake 240 ml   Output    Net 240 ml     I/O last 3 completed shifts:   In: 240 [P.O.:240]  Out: -     Safety Concerns:     [address] Concerns:197633263}    Impairments/Disabilities:      [address] Impairments/Disabilities:998089219}    Nutrition Therapy:  Current Nutrition Therapy:   [address] Diet KAAK:647637971} Routes of Feeding: {CHP DME Other Feedings:657027476}  Liquids: {Slp liquid thickness:42559}  Daily Fluid Restriction: {CHP DME Yes amt example:227366855}  Last Modified Barium Swallow with Video (Video Swallowing Test): {Done Not Done HUHB:465137334}    Treatments at the Time of Hospital Discharge:   Respiratory Treatments: ***  Oxygen Therapy:  {Therapy; copd oxygen:86125}  Ventilator:    {MH CC Vent FPAS:442683924}    Rehab Therapies: {THERAPEUTIC INTERVENTION:6261732633}  Weight Bearing Status/Restrictions: [facility] CC Weight Bearin}  Other Medical Equipment (for information only, NOT a DME order):  {EQUIPMENT:871998363}  Other Treatments: ***    Patient's personal belongings (please select all that are sent with patient):  {CHP DME Belongings:850149013}    RN SIGNATURE:  {Esignature:135479559}    CASE MANAGEMENT/SOCIAL WORK SECTION    Inpatient Status Date: 12/10/20    Readmission Risk Assessment Score:  Readmission Risk              Risk of Unplanned Readmission:        15           Discharging to Facility/ Agency   Name: [facility]   Address: [address]  Phone: [phone]  Fax: [phone]    / signature: Electronically signed by JIMBO Mandel on 2020 at 5:41 PM      PHYSICIAN SECTION    Prognosis: Good    Condition at Discharge: Stable    Rehab Potential (if transferring to Rehab): Good    Recommended Labs or Other Treatments After Discharge: accu check    Physician Certification: I certify the above information and transfer of Tianna Marinelil  is necessary for the continuing treatment of the diagnosis listed and that she requires Home Care for greater 30 days.      Update Admission H&P: No change in H&P    PHYSICIAN SIGNATURE:  Electronically signed by Paul Mason MD on 20 at 7:11 AM EST

## 2020-12-16 NOTE — PROGRESS NOTES
Hospitalist Progress Note      PCP: Tarun Lynch MD    Date of Admission: 12/10/2020    Chief Complaint: weakness    Subjective: pt in chair, looks comfortable     Medications:  Reviewed    Infusion Medications    dextrose       Scheduled Medications    [Held by provider] insulin glargine  20 Units Subcutaneous Nightly    furosemide  40 mg Oral Daily    aspirin  81 mg Oral Daily    carvedilol  25 mg Oral BID WC    hydroCHLOROthiazide  25 mg Oral Daily    insulin lispro  0-12 Units Subcutaneous TID WC    insulin lispro  0-6 Units Subcutaneous Nightly    miconazole   Topical BID    polyethylene glycol  17 g Oral BID    potassium chloride  20 mEq Oral BID    rivaroxaban  15 mg Oral BID WC    senna  1 tablet Oral Nightly     PRN Meds: ALPRAZolam, acetaminophen **OR** acetaminophen, polyethylene glycol, promethazine **OR** ondansetron, sodium chloride flush, dextrose, dextrose, glucagon (rDNA), glucose, acetaminophen, albuterol      Intake/Output Summary (Last 24 hours) at 12/16/2020 0905  Last data filed at 12/16/2020 0856  Gross per 24 hour   Intake 720 ml   Output    Net 720 ml       Exam:    /64   Pulse 63   Temp 97.5 °F (36.4 °C) (Oral)   Resp 16   Ht 5' (1.524 m)   Wt 263 lb 3.2 oz (119.4 kg)   SpO2 96%   BMI 51.40 kg/m²     General appearance: No apparent distress, appears stated age and cooperative. HEENT: Pupils equal, round, and reactive to light. Conjunctivae/corneas clear. Neck: Supple, with full range of motion. No jugular venous distention. Trachea midline. Respiratory:  Normal respiratory effort. Clear to auscultation, bilaterally without Rales/Wheezes/Rhonchi. Cardiovascular: Regular rate and rhythm with normal S1/S2 without murmurs, rubs or gallops. Abdomen: Soft, non-tender, non-distended with normal bowel sounds. Musculoskeletal:edema bilaterally. Full range of motion without deformity.   Skin:chronic changes Neurologic:  Neurovascularly intact without any focal sensory/motor deficits. Cranial nerves: II-XII intact, grossly non-focal.  Psychiatric: Alert and oriented, thought content appropriate, normal insight        Labs:   Recent Labs     12/14/20  0848   WBC 5.9   HGB 12.1   HCT 38.0        Recent Labs     12/14/20  0848      K 3.8   CL 97   CO2 30   BUN 43*   CREATININE 0.78   CALCIUM 9.3     No results for input(s): AST, ALT, BILIDIR, BILITOT, ALKPHOS in the last 72 hours. No results for input(s): INR in the last 72 hours. No results for input(s): Antoine [place] in the last 72 hours.     Urinalysis:      Lab Results   Component Value Date    NITRU Negative 12/07/2020    WBCUA 0-2 12/07/2020    BACTERIA RARE 12/07/2020    RBCUA NEG 02/21/2012    BLOODU Trace-intact 12/07/2020    SPECGRAV 1.015 12/07/2020    GLUCOSEU Negative 12/07/2020    GLUCOSEU NEG 02/21/2012       Radiology:  No orders to display           Assessment/Plan:    Active Hospital Problems    Diagnosis Date Noted    CHF (congestive heart failure), NYHA class I, acute on chronic, combined [facility] [I50.43] 12/11/2020         DVT Prophylaxis: xarelto  Diet: DIET GENERAL; Carb Control: 4 carb choices (60 gms)/meal; No Added Salt (3-4 GM)  Code Status: Full Code    PT/OT Eval Status: done    Dispo - Bilateral DVT, non occlusive- full anticoagulation  Legs pain/edema/redness- improved with current Tx  New diagnosed CHF -EF 20%, changed IV lasix to PO yesterday, cardiology on case   HTN- controlled  Anxiety today- ativan PRN   DM-hyperglycemia due to steroids injection- better with lantus, hold it today,  follow up with ACCU check   Chronic constipation,  follow up clinically, added lactulose BID  cronic bilateral legs skin changes/lymphedema-better with lasix  Morbid obesity with BMI 52%- supportive care  Medically stable for skilled admission at [address]      Electronically signed by Samuel Escobar MD on 12/16/2020 at 9:05 AM

## 2020-12-16 NOTE — PROGRESS NOTES
Occupational Therapy  Facility/Department: [facility] MED SURG UNIT  Daily Treatment Note  NAME: Verito Bowling  : 1945  MRN: [id number]    Date of Service: 2020    Discharge Recommendations:  Home with Home health OT, Home with assist PRN(or flip to [facility] for more therapy)       Assessment      REQUIRES OT FOLLOW UP: Yes     Pt. Is agreeable to UB exercises with OT. Pt. Is cooperative during OT session. Pt. Sated she has no pain right now. Answered pt.'s questions and concerns for home managment skills and safety awareness . Trained and educated pt. In HEP. Gave pt. Handout for HEP. Patient Diagnosis(es): There were no encounter diagnoses. has a past medical history of Cancer ([facility] [address] Hyperlipidemia, Hypertension, Osteoarthritis, and Type II or unspecified type diabetes mellitus without mention of complication, not stated as uncontrolled. has a past surgical history that includes hernia repair; Hysterectomy; Breast surgery; and Bladder surgery. Restrictions  Restrictions/Precautions  Restrictions/Precautions: Contact Precautions  Subjective   General  Chart Reviewed: Yes  Patient assessed for rehabilitation services?: Yes(in subacute rehab as of 12/10/20)  Family / Caregiver Present: No  Referring Practitioner: Dr. Verona Henderson  Diagnosis: BLE DVT  Subjective  Subjective: Pt pleasant, agreeable to OT      Orientation     Objective                             Toilet transfer-supervision  Toileting- supervision for urination                        Trained and educated pt. In HEP. Gave pt. Handout for HEP.            Type of ROM/Therapeutic Exercise  Type of ROM/Therapeutic Exercise: AROM  Exercises  Shoulder Flexion: 20  Shoulder Extension: 20  Shoulder ABduction: 20  Shoulder ADduction: 20  Horizontal ABduction: 20  Horizontal ADduction: 20  Elbow Flexion: 20  Elbow Extension: 20  Wrist Flexion: 20  Wrist Extension: 20  Other: to inc strength/end for ADL(RB between each set) Answered pt.'s questions and concerns about safety techniques and home management skills                 Plan   Plan  Times per week: 3-6x/wk  Times per day: Daily  Plan weeks: 7-10 days  Current Treatment Recommendations: Strengthening, ROM, Balance Training, Functional Mobility Training, Endurance Training, Safety Education & Training, Patient/Caregiver Education & Training, Self-Care / ADL, Home Management Training  G-Code     OutComes Score                                                  AM-PAC Score             Goals  Short term goals  Time Frame for Short term goals: 5-7 days  Short term goal 1: Tolerate 25-30min BUE ther ex/act to inc strength/end for ADL  Short term goal 2: SBA/Spv toileting  Short term goal 3: SBA/Spv LB dressing using AE prn as PLOF  Long term goals  Time Frame for Long term goals : 7-10 days  Long term goal 1: MI toileting  Long term goal 2: MI LB dressing and bathing using AE prn as PLOF  Long term goal 3: Inc to 5-7min stand miroslava/end for inc safety and I with ADL  Long term goal 4: I BUE HEP  Patient Goals   Patient goals :  \"To be independent around my house\"       Therapy Time   Individual Concurrent Group Co-treatment   Time In  12:45pm         Time Out  1:20pm         Minutes  [address] [facility] [address] Number: [id number]

## 2020-12-16 NOTE — PROGRESS NOTES
Chart reviewed. Multidisciplinary team met with patient and patient's sister Renae John in care conference. Discussed patient's care and DC planning. Patient reports that plan remains to DC on Thursday 12/17 back to sister's home with [facility]y. Patient has identified [facility] as agency of choice. This  contacted [address] [place]. With new referral.  Apple Daniels reports that [facility] will be able to follow patient upon DC. This  provided patient with a list of medical alert systems as requested. SS had provided patient with advanced directives packet which patient reports plan to review with sister. Patient and family identifiy no further help at home needs beyond [address] [address] upon DC. SOLANGE completed. DC process reviewed. SS to continue to follow as needed while patient is at [facility].

## 2020-12-17 VITALS
BODY MASS INDEX: 51.68 KG/M2 | HEIGHT: 60 IN | SYSTOLIC BLOOD PRESSURE: 115 MMHG | HEART RATE: 70 BPM | RESPIRATION RATE: 18 BRPM | TEMPERATURE: 97.6 F | OXYGEN SATURATION: 98 % | DIASTOLIC BLOOD PRESSURE: 62 MMHG | WEIGHT: 263.2 LBS

## 2020-12-17 LAB
ANION GAP SERPL CALCULATED.3IONS-SCNC: 8 MEQ/L (ref 9–15)
BASOPHILS ABSOLUTE: 0 K/UL (ref 0–0.2)
BASOPHILS RELATIVE PERCENT: 0.3 %
BUN BLDV-MCNC: 33 MG/DL (ref 8–23)
CALCIUM SERPL-MCNC: 9.2 MG/DL (ref 8.5–9.9)
CHLORIDE BLD-SCNC: 97 MEQ/L (ref 95–107)
CO2: 34 MEQ/L (ref 20–31)
CREAT SERPL-MCNC: 0.7 MG/DL (ref 0.5–0.9)
EOSINOPHILS ABSOLUTE: 0.3 K/UL (ref 0–0.7)
EOSINOPHILS RELATIVE PERCENT: 3.8 %
GFR AFRICAN AMERICAN: >60
GFR NON-AFRICAN AMERICAN: >60
GLUCOSE BLD-MCNC: 149 MG/DL (ref 60–115)
GLUCOSE BLD-MCNC: 167 MG/DL (ref 70–99)
GLUCOSE BLD-MCNC: 239 MG/DL (ref 60–115)
HCT VFR BLD CALC: 41.8 % (ref 37–47)
HEMOGLOBIN: 13.1 G/DL (ref 12–16)
LYMPHOCYTES ABSOLUTE: 2.2 K/UL (ref 1–4.8)
LYMPHOCYTES RELATIVE PERCENT: 26.4 %
MCH RBC QN AUTO: 29.8 PG (ref 27–31.3)
MCHC RBC AUTO-ENTMCNC: 31.4 % (ref 33–37)
MCV RBC AUTO: 94.9 FL (ref 82–100)
MONOCYTES ABSOLUTE: 0.8 K/UL (ref 0.2–0.8)
MONOCYTES RELATIVE PERCENT: 9.6 %
NEUTROPHILS ABSOLUTE: 5 K/UL (ref 1.4–6.5)
NEUTROPHILS RELATIVE PERCENT: 59.9 %
PDW BLD-RTO: 15.9 % (ref 11.5–14.5)
PERFORMED ON: ABNORMAL
PERFORMED ON: ABNORMAL
PLATELET # BLD: 242 K/UL (ref 130–400)
POTASSIUM SERPL-SCNC: 4 MEQ/L (ref 3.4–4.9)
RBC # BLD: 4.4 M/UL (ref 4.2–5.4)
SODIUM BLD-SCNC: 139 MEQ/L (ref 135–144)
WBC # BLD: 8.3 K/UL (ref 4.8–10.8)

## 2020-12-17 PROCEDURE — 97530 THERAPEUTIC ACTIVITIES: CPT

## 2020-12-17 PROCEDURE — 97110 THERAPEUTIC EXERCISES: CPT

## 2020-12-17 PROCEDURE — 80048 BASIC METABOLIC PNL TOTAL CA: CPT

## 2020-12-17 PROCEDURE — 97535 SELF CARE MNGMENT TRAINING: CPT

## 2020-12-17 PROCEDURE — 97116 GAIT TRAINING THERAPY: CPT

## 2020-12-17 PROCEDURE — 85025 COMPLETE CBC W/AUTO DIFF WBC: CPT

## 2020-12-17 PROCEDURE — 6370000000 HC RX 637 (ALT 250 FOR IP): Performed by: INTERNAL MEDICINE

## 2020-12-17 PROCEDURE — 36415 COLL VENOUS BLD VENIPUNCTURE: CPT

## 2020-12-17 RX ORDER — POTASSIUM CHLORIDE 20 MEQ/1
20 TABLET, EXTENDED RELEASE ORAL 2 TIMES DAILY
Qty: 60 TABLET | Refills: 3 | Status: SHIPPED | OUTPATIENT
Start: 2020-12-17 | End: 2021-01-05 | Stop reason: ALTCHOICE

## 2020-12-17 RX ORDER — HYDROCHLOROTHIAZIDE 25 MG/1
25 TABLET ORAL DAILY
Qty: 30 TABLET | Refills: 0 | Status: ON HOLD | OUTPATIENT
Start: 2020-12-17 | End: 2021-03-22 | Stop reason: HOSPADM

## 2020-12-17 RX ORDER — FUROSEMIDE 40 MG/1
40 TABLET ORAL DAILY
Qty: 60 TABLET | Refills: 3 | Status: ON HOLD | OUTPATIENT
Start: 2020-12-17 | End: 2021-03-22 | Stop reason: SDUPTHER

## 2020-12-17 RX ADMIN — ASPIRIN 81 MG: 81 TABLET, COATED ORAL at 07:53

## 2020-12-17 RX ADMIN — RIVAROXABAN 15 MG: 15 TABLET, FILM COATED ORAL at 07:53

## 2020-12-17 RX ADMIN — HYDROCHLOROTHIAZIDE 25 MG: 25 TABLET ORAL at 07:53

## 2020-12-17 RX ADMIN — FUROSEMIDE 40 MG: 40 TABLET ORAL at 07:53

## 2020-12-17 RX ADMIN — MICONAZOLE NITRATE: 20 POWDER TOPICAL at 07:58

## 2020-12-17 RX ADMIN — CARVEDILOL 25 MG: 25 TABLET, FILM COATED ORAL at 07:53

## 2020-12-17 RX ADMIN — POTASSIUM CHLORIDE 20 MEQ: 750 TABLET, EXTENDED RELEASE ORAL at 07:53

## 2020-12-17 ASSESSMENT — PAIN DESCRIPTION - PROGRESSION: CLINICAL_PROGRESSION: NOT CHANGED

## 2020-12-17 NOTE — PROGRESS NOTES
13:30pm: Patient left floor via whlchair. Belonging bags, flowers and purse with pt. A male friend met her and me, @ the front entrance. She left via his vehicle.

## 2020-12-17 NOTE — PROGRESS NOTES
Occupational Therapy  Facility/Department: [facility] MED SURG UNIT  Daily Treatment Note  NAME: [facility]  : 1945  MRN: [id number]    Date of Service: 2020    Discharge Recommendations:  Home with Home health OT, Home with assist PRN(or flip to [facility] for more therapy)       Assessment      REQUIRES OT FOLLOW UP: Yes     Pt. Is agreeable to OT. Partial co-treated with PTA for safety. Pt. needed to work on tub transfer and car transfer so ARREAGA and PTA teamed up for safety. Pt. Requires extra time and a leg  to get into car and tub. Trained and educated pt. In tub transfer techniques . Demonstrated tub transfer with bench for safety. Pt. tolerated standing for 7 minutes while ambulating with rollator back to her room. Pt. Demonstrated toilet transfer at [address] and toileting at [address]. Pt. tolerated standing at the sink washing hands for 3 minutes with no loss of balance. Pt. stated she has no pain right now. Patient Diagnosis(es): There were no encounter diagnoses. has a past medical history of Cancer ([facility] [address] Hyperlipidemia, Hypertension, Osteoarthritis, and Type II or unspecified type diabetes mellitus without mention of complication, not stated as uncontrolled. has a past surgical history that includes hernia repair; Hysterectomy; Breast surgery; and Bladder surgery.     Restrictions  Restrictions/Precautions  Restrictions/Precautions: Contact Precautions  Subjective   General  Chart Reviewed: Yes  Patient assessed for rehabilitation services?: Yes(in subacute rehab as of 12/10/20)  Family / Caregiver Present: No  Referring Practitioner: Dr. Nicky Valdez  Diagnosis: BLE DVT  Subjective  Subjective: Pt pleasant, agreeable to OT    Partial co-treat with PTA for safety  Orientation     Objective             Standing Balance  Time: (6-7 minutes with rollator 2 x)  While ambulating  Tub Transfers  Tub Transfers Comments: (with tub bench-CGA with vc and a leg )      Toileting-SBA  Toilet transfer-SBA Pt. Tolerated standing at sink for 2-3 minutes while washing hands at supervision                                                                 Plan   Plan  Times per week: 3-6x/wk  Times per day: Daily  Plan weeks: 7-10 days  Current Treatment Recommendations: Strengthening, ROM, Balance Training, Functional Mobility Training, Endurance Training, Safety Education & Training, Patient/Caregiver Education & Training, Self-Care / ADL, Home Management Training  G-Code     OutComes Score                                                  AM-PAC Score             Goals  Short term goals  Time Frame for Short term goals: 5-7 days  Short term goal 1: Tolerate 25-30min BUE ther ex/act to inc strength/end for ADL  Short term goal 2: SBA/Spv toileting  Short term goal 3: SBA/Spv LB dressing using AE prn as PLOF  Long term goals  Time Frame for Long term goals : 7-10 days  Long term goal 1: MI toileting  Long term goal 2: MI LB dressing and bathing using AE prn as PLOF  Long term goal 3: Inc to 5-7min stand miroslava/end for inc safety and I with ADL  Long term goal 4: I BUE HEP  Patient Goals   Patient goals :  \"To be independent around my house\"       Therapy Time   Individual Concurrent Group Co-treatment   Time In  9:20am         Time Out 9:50am         Minutes  [address] [facility]  Number: [id number]

## 2020-12-17 NOTE — DISCHARGE SUMMARY
Discharge Summary    Patient:  Hellen Almanza  YOB: 1945    MRN: [id number]   Acct: [de-identified]    Primary Care Physician: Nurys Hilton MD    Admit date:  12/10/2020    Discharge date:   12/17/20      Discharge Diagnoses:   <principal problem not specified>  Active Problems:    CHF (congestive heart failure), NYHA class I, acute on chronic, combined ([address]  Resolved Problems:    * No resolved hospital problems. *      Admitted for: [facility] Course: patient was admitted with bilateral legs DVT, was diagnosed with chf with EF 20-25%, was evaluated by cardiology. Underwent post acute rehabilitation. After completing her stay will be DC home with Kim Harrison    Consultants:  cardio    Discharge Medications:       Medication List      START taking these medications    hydroCHLOROthiazide 25 MG tablet  Commonly known as: HYDRODIURIL  Take 1 tablet by mouth daily     potassium chloride 20 MEQ extended release tablet  Commonly known as: KLOR-CON M  Take 1 tablet by mouth 2 times daily     rivaroxaban 20 MG Tabs tablet  Commonly known as: XARELTO  Take 1 tablet by mouth daily (with breakfast)        CHANGE how you take these medications    furosemide 40 MG tablet  Commonly known as: LASIX  Take 1 tablet by mouth daily  What changed:   · medication strength  · See the new instructions.         CONTINUE taking these medications    acetaminophen 500 MG tablet  Commonly known as: TYLENOL     ammonium lactate 12 % lotion  Commonly known as: LAC-HYDRIN     carvedilol 25 MG tablet  Commonly known as: COREG     Ecotrin Low Strength 81 MG EC tablet  Generic drug: aspirin     HumaLOG KwikPen 100 UNIT/ML pen  Generic drug: insulin lispro     Lantus SoloStar 100 UNIT/ML injection pen  Generic drug: insulin glargine     potassium chloride 10 MEQ extended release capsule  Commonly known as: MICRO-K     ProAir  (90 Base) MCG/ACT inhaler  Generic drug: albuterol sulfate HFA     sulfaSALAzine 500 MG tablet Commonly known as: AZULFIDINE        STOP taking these medications    Hyzaar 100-25 MG per tablet  Generic drug: losartan-hydroCHLOROthiazide           Where to Get Your Medications      These medications were sent to [facility] [id number] [facility] [place] [phone]  [address], [address]    Phone: [phone]   · furosemide 40 MG tablet  · hydroCHLOROthiazide 25 MG tablet  · potassium chloride 20 MEQ extended release tablet  · rivaroxaban 20 MG Tabs tablet         Physical Exam:    Vitals:  Vitals:    12/15/20 0703 12/16/20 0555 12/16/20 1709 12/17/20 0550   BP: 123/69 121/64 137/74 115/62   Pulse: 64 63 74 70   Resp:    18   Temp: 97.5 °F (36.4 °C) 97.5 °F (36.4 °C)  97.6 °F (36.4 °C)   TempSrc:  Oral  Oral   SpO2: 96% 96% 98% 98%   Weight:       Height:         Weight: Weight: 263 lb 3.2 oz (119.4 kg)     24 hour intake/output:    Intake/Output Summary (Last 24 hours) at 12/17/2020 0716  Last data filed at 12/16/2020 1708  Gross per 24 hour   Intake 840 ml   Output 1050 ml   Net -210 ml       General appearance - alert, well appearing, and in no distress  Chest - clear to auscultation, no wheezes, rales or rhonchi, symmetric air entry  Heart - normal rate, regular rhythm, normal S1, S2, no murmurs, rubs, clicks or gallops  Abdomen - soft, nontender, nondistended, no masses or organomegaly  Obese: Yes; Protuberant: Yes   Neurological - alert, oriented, normal speech, no focal findings or movement disorder noted  Extremities - edema  Skin - chronic lower legs skin changes       Radiology reports as per the Radiologist  Radiology: No results found.      Results for orders placed or performed during the hospital encounter of 28/43/80   Basic Metabolic Panel   Result Value Ref Range    Sodium 137 135 - 144 mEq/L    Potassium 4.5 3.4 - 4.9 mEq/L    Chloride 97 95 - 107 mEq/L    CO2 32 (H) 20 - 31 mEq/L    Anion Gap 8 (L) 9 - 15 mEq/L    Glucose 368 (H) 70 - 99 mg/dL    BUN 27 (H) 8 - 23 mg/dL    CREATININE 0.79 0.50 - 0.90 mg/dL    GFR Non-African American >60.0 >60    GFR  >60.0 >60    Calcium 9.3 8.5 - 9.9 mg/dL   Basic Metabolic Panel   Result Value Ref Range    Sodium 134 (L) 135 - 144 mEq/L    Potassium 4.6 3.4 - 4.9 mEq/L    Chloride 95 95 - 107 mEq/L    CO2 27 20 - 31 mEq/L    Anion Gap 12 9 - 15 mEq/L    Glucose 313 (H) 70 - 99 mg/dL    BUN 43 (H) 8 - 23 mg/dL    CREATININE 0.84 0.50 - 0.90 mg/dL    GFR Non-African American >60.0 >60    GFR  >60.0 >60    Calcium 9.4 8.5 - 9.9 mg/dL   Basic Metabolic Panel   Result Value Ref Range    Sodium 138 135 - 144 mEq/L    Potassium 3.8 3.4 - 4.9 mEq/L    Chloride 97 95 - 107 mEq/L    CO2 30 20 - 31 mEq/L    Anion Gap 11 9 - 15 mEq/L    Glucose 155 (H) 70 - 99 mg/dL    BUN 43 (H) 8 - 23 mg/dL    CREATININE 0.78 0.50 - 0.90 mg/dL    GFR Non-African American >60.0 >60    GFR  >60.0 >60    Calcium 9.3 8.5 - 9.9 mg/dL   CBC Auto Differential   Result Value Ref Range    WBC 5.9 4.8 - 10.8 K/uL    RBC 4.10 (L) 4.20 - 5.40 M/uL    Hemoglobin 12.1 12.0 - 16.0 g/dL    Hematocrit 38.0 37.0 - 47.0 %    MCV 92.7 82.0 - 100.0 fL    MCH 29.5 27.0 - 31.3 pg    MCHC 31.8 (L) 33.0 - 37.0 %    RDW 15.6 (H) 11.5 - 14.5 %    Platelets 808 642 - 381 K/uL    Neutrophils % 54.4 %    Lymphocytes % 28.4 %    Monocytes % 13.2 %    Eosinophils % 3.5 %    Basophils % 0.5 %    Neutrophils Absolute 3.2 1.4 - 6.5 K/uL    Lymphocytes Absolute 1.7 1.0 - 4.8 K/uL    Monocytes Absolute 0.8 0.2 - 0.8 K/uL    Eosinophils Absolute 0.2 0.0 - 0.7 K/uL    Basophils Absolute 0.0 0.0 - 0.2 K/uL   Basic Metabolic Panel   Result Value Ref Range    Sodium 139 135 - 144 mEq/L    Potassium 4.0 3.4 - 4.9 mEq/L    Chloride 97 95 - 107 mEq/L    CO2 34 (H) 20 - 31 mEq/L    Anion Gap 8 (L) 9 - 15 mEq/L    Glucose 167 (H) 70 - 99 mg/dL    BUN 33 (H) 8 - 23 mg/dL    CREATININE 0.70 0.50 - 0.90 mg/dL    GFR Non-African American >60.0 >60    GFR  >60.0 >60    Calcium 9.2 8.5 - 9.9 mg/dL   CBC Auto Differential   Result Value Ref Range    WBC 8.3 4.8 - 10.8 K/uL    RBC 4.40 4.20 - 5.40 M/uL    Hemoglobin 13.1 12.0 - 16.0 g/dL    Hematocrit 41.8 37.0 - 47.0 %    MCV 94.9 82.0 - 100.0 fL    MCH 29.8 27.0 - 31.3 pg    MCHC 31.4 (L) 33.0 - 37.0 %    RDW 15.9 (H) 11.5 - 14.5 %    Platelets 556 510 - 835 K/uL    Neutrophils % 59.9 %    Lymphocytes % 26.4 %    Monocytes % 9.6 %    Eosinophils % 3.8 %    Basophils % 0.3 %    Neutrophils Absolute 5.0 1.4 - 6.5 K/uL    Lymphocytes Absolute 2.2 1.0 - 4.8 K/uL    Monocytes Absolute 0.8 0.2 - 0.8 K/uL    Eosinophils Absolute 0.3 0.0 - 0.7 K/uL    Basophils Absolute 0.0 0.0 - 0.2 K/uL   POCT Glucose   Result Value Ref Range    POC Glucose 368 (H) 60 - 115 mg/dl    Performed on ACCU-CHEK    POCT Glucose   Result Value Ref Range    POC Glucose 341 (H) 60 - 115 mg/dl    Performed on ACCU-CHEK    POCT Glucose   Result Value Ref Range    POC Glucose 321 (H) 60 - 115 mg/dl    Performed on ACCU-CHEK    POCT Glucose   Result Value Ref Range    POC Glucose 398 (H) 60 - 115 mg/dl    Performed on ACCU-CHEK    POCT Glucose   Result Value Ref Range    POC Glucose 322 (H) 60 - 115 mg/dl    Performed on ACCU-CHEK    POCT Glucose   Result Value Ref Range    POC Glucose 306 (H) 60 - 115 mg/dl    Performed on ACCU-CHEK    POCT Glucose   Result Value Ref Range    POC Glucose 292 (H) 60 - 115 mg/dl    Performed on ACCU-CHEK    POCT Glucose   Result Value Ref Range    POC Glucose 308 (H) 60 - 115 mg/dl    Performed on ACCU-CHEK    POCT Glucose   Result Value Ref Range    POC Glucose 215 (H) 60 - 115 mg/dl    Performed on ACCU-CHEK    POCT Glucose   Result Value Ref Range    POC Glucose 111 60 - 115 mg/dl    Performed on ACCU-CHEK    POCT Glucose   Result Value Ref Range    POC Glucose 140 (H) 60 - 115 mg/dl    Performed on ACCU-CHEK    POCT Glucose   Result Value Ref Range    POC Glucose 197 (H) 60 - 115 mg/dl Performed on ACCU-CHEK    POCT Glucose   Result Value Ref Range    POC Glucose 162 (H) 60 - 115 mg/dl    Performed on ACCU-CHEK    POCT Glucose   Result Value Ref Range    POC Glucose 121 (H) 60 - 115 mg/dl    Performed on ACCU-CHEK    POCT Glucose   Result Value Ref Range    POC Glucose 145 (H) 60 - 115 mg/dl    Performed on ACCU-CHEK    POCT Glucose   Result Value Ref Range    POC Glucose 157 (H) 60 - 115 mg/dl    Performed on ACCU-CHEK    POCT Glucose   Result Value Ref Range    POC Glucose 156 (H) 60 - 115 mg/dl    Performed on ACCU-CHEK    POCT Glucose   Result Value Ref Range    POC Glucose 157 (H) 60 - 115 mg/dl    Performed on ACCU-CHEK    POCT Glucose   Result Value Ref Range    POC Glucose 153 (H) 60 - 115 mg/dl    Performed on ACCU-CHEK    POCT Glucose   Result Value Ref Range    POC Glucose 207 (H) 60 - 115 mg/dl    Performed on ACCU-CHEK    POCT Glucose   Result Value Ref Range    POC Glucose 199 (H) 60 - 115 mg/dl    Performed on ACCU-CHEK    POCT Glucose   Result Value Ref Range    POC Glucose 129 (H) 60 - 115 mg/dl    Performed on ACCU-CHEK    POCT Glucose   Result Value Ref Range    POC Glucose 172 (H) 60 - 115 mg/dl    Performed on ACCU-CHEK    POCT Glucose   Result Value Ref Range    POC Glucose 202 (H) 60 - 115 mg/dl    Performed on ACCU-CHEK    POCT Glucose   Result Value Ref Range    POC Glucose 248 (H) 60 - 115 mg/dl    Performed on ACCU-CHEK    POCT Glucose   Result Value Ref Range    POC Glucose 149 (H) 60 - 115 mg/dl    Performed on ACCU-CHEK        Diet:  DIET GENERAL; Carb Control: 4 carb choices (60 gms)/meal; No Added Salt (3-4 GM)    Activity:  Activity as tolerated (Patient may move about with assist as indicated or with supervision.)    Follow-up:  in 1 weeks with Cheryle Dago, MD,     Disposition: home    Condition: Stable    Time Spent: 45 minutes    Electronically signed by Emily Nichols MD on 12/17/2020 at 7:16 AM    Discharging Hospitalist

## 2020-12-17 NOTE — PROGRESS NOTES
Physical Therapy  Facility/Department: [facility] MED SURG UNIT  Daily Treatment Note  NAME: Eden Schulte  : 1945  MRN: [id number]    Date of Service: 2020    Discharge Recommendations:  Home with Home health PT, Home with assist PRN        Assessment   Body structures, Functions, Activity limitations: Decreased functional mobility ; Decreased ROM; Decreased strength;Decreased endurance; Increased pain;Decreased posture;Decreased sensation  Assessment: Reviewed HO for HEP for seated therex issued previously per pt. request.   Pt. encouraged to cont to focus on mobiltiy and therex to advance overall strength and function. Partial cotreat with ARREAGA for cr/tub transfer training . Treatment Diagnosis: Decreased mobiility and LE weakness post BLE DVT  Prognosis: Good  REQUIRES PT FOLLOW UP: Yes     Patient Diagnosis(es): There were no encounter diagnoses. has a past medical history of Cancer ([facility] Hyperlipidemia, Hypertension, Osteoarthritis, and Type II or unspecified type diabetes mellitus without mention of complication, not stated as uncontrolled. has a past surgical history that includes hernia repair; Hysterectomy; Breast surgery; and Bladder surgery. Restrictions  Restrictions/Precautions  Restrictions/Precautions: Contact Precautions  Subjective   General  Chart Reviewed: Yes  Additional Pertinent Hx: Pt admitted due to DVT B LE - Cleared with Dr Kalin crowder for PT/OT to work with pt  Family / Caregiver Present: No  Referring Practitioner: Dr Kalin Garcia  Subjective  Subjective: Pt. c/o L knee pain 5/10 with WBing. \"We had a workout with the stairs. \"            Orientation     Cognition      Objective         Ambulation  Ambulation?: Yes  Ambulation 1  Surface: level tile  Device: Rollator  Assistance: Stand by assistance  Quality of Gait: trendelenburg pattern with cues for inc hip and knee flexion limited carryover   Distance: 120' x2 Comments: Intermittent short standing RB for posutral adjustments x 2    Stairs/Curb  Stairs?: No(deferred due to L knee pain )     Balance  Sitting - Static: Good  Sitting - Dynamic: Good  Standing - Static: Fair  Standing - Dynamic: Fair;-  Exercises  Heelslides: seated x 10 hold 5 sec   Hip Flexion: seated march x 10 hold 5 sec   Hip Abduction: seated hip abduction 2 x10 hold 5 sec GTB   Knee Long Arc Quad: x 10 bilat hold 5 sec   Ankle Pumps: seated x 20 hold 5 sec   Comments: also verbally reviewed supine therex   Other exercises  Other exercises 1: seated hip adduction x 10 hold 5 sec          Comment: Practiced car transfer with pt. use of leg  mimicing to getting in out of passenger side. Requires CGA for safety and max VC for technique               G-Code     OutComes Score                                                     AM-PAC Score             Goals  Short term goals  Time Frame for Short term goals: 3-5 days  Short term goal 1: supervised transfer with Rollator, no vc for safety   Short term goal 2: tolerate 2x5 lifting leg onto 10 inch stool  to simulate LE into car  Short term goal 3: Amb >= 120 ft with ROllator before fatigued  Short term goal 4: Assess 2 steps ^inch with 2 rails and <= Deedee marked time(GOA L MET )  Short term goal 5: Tolerate 2x10 sweated JOSUÉ to gain strength and endurance for function  Long term goals  Time Frame for Long term goals : 7-10 days  Long term goal 1: transfers modified independent   Long term goal 2: sit to supine in bed with sheet leg  with <= Min A   Long term goal 3: Amb >= 150ft with least AD before fatigued, less Trendeelenberg gait , more upright posture  Long term goal 4: 2 stairs with 2 rails with CGA   Long term goal 5: HEP in place for discharge; inc LE strength any amount to achieve functional goals   Patient Goals   Patient goals :  To get stronger to return home    Plan    Plan  Times per week: 5-7 days  Times per day: (1-2x per day) Plan weeks: (1.5 wks)  Current Treatment Recommendations: Strengthening, Balance Training, Transfer Training, Endurance Training, Gait Training, Stair training  Safety Devices  Type of devices:  All fall risk precautions in place, Call light within reach, Chair alarm in place, Gait belt, Left in chair     Therapy Time   Individual Concurrent Group Co-treatment   Time In  900         Time Out  1000         Minutes  [address] [place] [address] and [place] [address] Number: [id number]

## 2021-01-05 ENCOUNTER — TELEPHONE (OUTPATIENT)
Dept: CARDIOLOGY CLINIC | Age: 76
End: 2021-01-05

## 2021-01-05 ENCOUNTER — OFFICE VISIT (OUTPATIENT)
Dept: CARDIOLOGY CLINIC | Age: 76
End: 2021-01-05
Payer: MEDICARE

## 2021-01-05 VITALS
BODY MASS INDEX: 48.42 KG/M2 | DIASTOLIC BLOOD PRESSURE: 84 MMHG | HEIGHT: 60 IN | SYSTOLIC BLOOD PRESSURE: 124 MMHG | WEIGHT: 246.6 LBS | HEART RATE: 105 BPM | RESPIRATION RATE: 22 BRPM | OXYGEN SATURATION: 98 %

## 2021-01-05 DIAGNOSIS — I50.43 CHF (CONGESTIVE HEART FAILURE), NYHA CLASS I, ACUTE ON CHRONIC, COMBINED (HCC): Primary | ICD-10-CM

## 2021-01-05 PROCEDURE — 99214 OFFICE O/P EST MOD 30 MIN: CPT | Performed by: INTERNAL MEDICINE

## 2021-01-05 RX ORDER — HYDROCHLOROTHIAZIDE 25 MG/1
25 TABLET ORAL DAILY
Qty: 30 TABLET | Refills: 5 | Status: CANCELLED | OUTPATIENT
Start: 2021-01-05

## 2021-01-05 RX ORDER — SERTRALINE HYDROCHLORIDE 25 MG/1
25 TABLET, FILM COATED ORAL DAILY
COMMUNITY
Start: 2020-12-28

## 2021-01-05 RX ORDER — SPIRONOLACTONE 25 MG/1
12.5 TABLET ORAL DAILY
Qty: 30 TABLET | Refills: 1 | Status: SHIPPED | OUTPATIENT
Start: 2021-01-05 | End: 2021-04-16

## 2021-01-05 RX ORDER — ACETAMINOPHEN 325 MG/1
650 TABLET ORAL EVERY 6 HOURS PRN
COMMUNITY
Start: 2020-08-13

## 2021-01-05 ASSESSMENT — ENCOUNTER SYMPTOMS
COLOR CHANGE: 0
VOMITING: 0
SHORTNESS OF BREATH: 0
WHEEZING: 0
BLOOD IN STOOL: 0
TROUBLE SWALLOWING: 0
NAUSEA: 0
CHEST TIGHTNESS: 0
DIARRHEA: 0
FACIAL SWELLING: 0
VOICE CHANGE: 0
APNEA: 0
ANAL BLEEDING: 0
ABDOMINAL DISTENTION: 0

## 2021-01-05 NOTE — PROGRESS NOTES
[facility] CARDIOLOGY OFFICE FOLLOW-UP      Patient: Adams Pollock  YOB: 1945  MRN: [id number]    Chief Complaint:  Chief Complaint   Patient presents with    Follow[facility] from [facility]    Congestive Heart Failure         Subjective/HPI:  1/5/21: Patient presents today for follow-up of her cardiomyopathy. She was admitted in the hospital with acute congestive heart failure and bilateral DVT. Was treated with Xarelto. The breathing has improved. Eject fraction 20% and etiology is undetermined at this point very pleasant lady. 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 Has lived in [place] all her life she complains of mild short of breath. BUN is slightly elevated and probably does not take enough liquids. Retrograde systolic pressure is 50 mmHg. She is diabetic. Will discontinue hydrochlorothiazide. Add Aldactone 12.5 daily. BMP in 2 weeks and see me in 3 weeks. Consider doing a Lexiscan increase fluid intake             Past Medical History:   Diagnosis Date    Cancer ([address]     bladder, breast,     Hyperlipidemia     Hypertension     Osteoarthritis     Type II or unspecified type diabetes mellitus without mention of complication, not stated as uncontrolled        Past Surgical History:   Procedure Laterality Date    BLADDER SURGERY      cancer    BREAST SURGERY      HERNIA REPAIR      HYSTERECTOMY         No family history on file.     Social History     Socioeconomic History    Marital status: Single     Spouse name: None    Number of children: None    Years of education: None    Highest education level: None   Occupational History    Occupation:    Social Needs    Financial resource strain: None    Food insecurity     Worry: None     Inability: None    Transportation needs     Medical: None     Non-medical: None   Tobacco Use    Smoking status: Former Smoker    Smokeless tobacco: Never Used    Tobacco comment: quit 40 years ago   Substance and Sexual Activity    Alcohol use: No    Drug use: No    Sexual activity: None   Lifestyle    Physical activity     Days per week: None     Minutes per session: None    Stress: None   Relationships    Social connections     Talks on phone: None     Gets together: None     Attends Yazidi service: None     Active member of club or organization: None     Attends meetings of clubs or organizations: None     Relationship status: None    Intimate partner violence     Fear of current or ex partner: None     Emotionally abused: None     Physically abused: None     Forced sexual activity: None   Other Topics Concern    None   Social History Narrative    None       Allergies   Allergen Reactions    Rocephin [Ceftriaxone Sodium]     Clindamycin Hives    Clindamycin/Lincomycin Rash    Minocycline Rash    Tetracyclines & Related Swelling     allergic to Minocin    Ceftriaxone Rash       Current Outpatient Medications   Medication Sig Dispense Refill    acetaminophen (TYLENOL) 325 MG tablet Take 650 mg by mouth every 6 hours as needed      sertraline (ZOLOFT) 25 MG tablet Take 25 mg by mouth daily      spironolactone (ALDACTONE) 25 MG tablet Take 0.5 tablets by mouth daily 30 tablet 1    furosemide (LASIX) 40 MG tablet Take 1 tablet by mouth daily 60 tablet 3    hydroCHLOROthiazide (HYDRODIURIL) 25 MG tablet Take 1 tablet by mouth daily 30 tablet 0    rivaroxaban (XARELTO) 20 MG TABS tablet Take 1 tablet by mouth daily (with breakfast) 30 tablet 3    carvedilol (COREG) 25 MG tablet Take 25 mg by mouth 2 times daily (with meals)      ammonium lactate (LAC-HYDRIN) 12 % lotion Apply topically as needed      albuterol sulfate HFA (PROAIR HFA) 108 (90 Base) MCG/ACT inhaler USE 1 INHALATION EVERY 6 HOURS AS NEEDED FOR WHEEZING OR SHORTNESS OF BREATH AS DIRECTED      sulfaSALAzine (AZULFIDINE) 500 MG tablet Take 1,500 mg by mouth 2 times daily      potassium chloride (MICRO-K) 10 MEQ extended release capsule TAKE 1 TABLET TWICE A DAY      aspirin (ECOTRIN LOW STRENGTH) 81 MG EC tablet Take 81 mg by mouth      insulin glargine (LANTUS SOLOSTAR) 100 UNIT/ML injection pen INJECT 22 UNITS AT BEDTIME      insulin lispro (HUMALOG KWIKPEN) 100 UNIT/ML pen Inject 6 to 8 units before breakfast, 8 units with lunch and 12 units at dinner       No current facility-administered medications for this visit. Review of Systems:   Review of Systems   Constitutional: Negative for activity change, appetite change, diaphoresis, fatigue and unexpected weight change. HENT: Negative for facial swelling, nosebleeds, trouble swallowing and voice change. Respiratory: Negative for apnea, chest tightness, shortness of breath and wheezing. Cardiovascular: Negative for chest pain, palpitations and leg swelling. Gastrointestinal: Negative for abdominal distention, anal bleeding, blood in stool, diarrhea, nausea and vomiting. Genitourinary: Negative for decreased urine volume and dysuria. Musculoskeletal: Negative for gait problem, myalgias, neck pain and neck stiffness. Skin: Negative for color change, pallor, rash and wound. Neurological: Negative for dizziness, seizures, syncope, facial asymmetry, weakness, light-headedness, numbness and headaches. Hematological: Does not bruise/bleed easily. Psychiatric/Behavioral: Negative for agitation, behavioral problems, confusion, hallucinations and suicidal ideas. The patient is not nervous/anxious. All other systems reviewed and are negative. Review of System is negative except for as mentioned above. Physical Examination:    /84 (Site: Right Upper Arm, Position: Sitting, Cuff Size: Large Adult)   Pulse 105   Resp 22   Ht 5' (1.524 m)   Wt 246 lb 9.6 oz (111.9 kg)   SpO2 98%   BMI 48.16 kg/m²    Physical Exam   Constitutional: She appears healthy. No distress.    HENT:   Nose: Nose normal.   Mouth/Throat: Dentition is normal. Oropharynx is clear.   Eyes: Pupils are equal, round, and reactive to light. Conjunctivae are normal.   Neck: Normal range of motion and thyroid normal. Neck supple. Cardiovascular: Regular rhythm, S1 normal, S2 normal, normal heart sounds, intact distal pulses and normal pulses. PMI is not displaced. No murmur heard. Pulmonary/Chest: She has no wheezes. She has no rales. She exhibits no tenderness. Abdominal: Soft. Bowel sounds are normal. She exhibits no distension and no mass. There is no splenomegaly or hepatomegaly. There is no abdominal tenderness. No hernia. Neurological: She is alert and oriented to person, place, and time. She has normal motor skills. Gait normal.   Skin: Skin is warm and dry. No cyanosis. No jaundice. Nails show no clubbing. Patient Active Problem List   Diagnosis    Venous insufficiency (chronic) (peripheral)    Venous ulcer of right leg ([facility])    Edema leg    Obesity due to excess calories    Lymphedema of both lower extremities    Elephantiasis nostra verrucosa    Skin growth    Venous stasis ulcer of right calf with fat layer exposed without varicose veins ([facility])    PAD (peripheral artery disease) ([facility])    DVT, lower extremity, distal, acute, bilateral ([address]    CHF (congestive heart failure), NYHA class I, acute on chronic, combined ([address]           Orders Placed This Encounter   Procedures    Basic Metabolic Panel     Standing Status:   Future     Standing Expiration Date:   1/5/2022         Orders Placed This Encounter   Medications    spironolactone (ALDACTONE) 25 MG tablet     Sig: Take 0.5 tablets by mouth daily     Dispense:  30 tablet     Refill:  1             Assessment/Orders:       ICD-10-CM    1.  CHF (congestive heart failure), NYHA class I, acute on chronic, combined ([facility])  J37.74 Basic Metabolic Panel       Orders Placed This Encounter   Medications    spironolactone (ALDACTONE) 25 MG tablet     Sig: Take 0.5 tablets by mouth daily     Dispense:  30 tablet Refill:  1       Medications Discontinued During This Encounter   Medication Reason    acetaminophen (TYLENOL) 500 MG tablet DOSE ADJUSTMENT    potassium chloride (KLOR-CON M) 20 MEQ extended release tablet Alternate therapy       Orders Placed This Encounter   Procedures    Basic Metabolic Panel     Standing Status:   Future     Standing Expiration Date:   1/5/2022         Plan: Will start Aldactone 12.5mg Daily    Discontinue HCTZ    To have a BMP lab done in 2 weeks. Stay on same medications. See me in 3 weeks.         Electronically signed by: Janay Ojeda MD  1/11/2021 8:09 AM

## 2021-01-05 NOTE — TELEPHONE ENCOUNTER
Patient called about information printed on her AVS from today's (01/05/2021) visit. . She states there is incorrect info as far as her start/stop medication and she would like a phone call to discuss/clarify

## 2021-03-19 ENCOUNTER — HOSPITAL ENCOUNTER (INPATIENT)
Age: 76
LOS: 3 days | Discharge: HOME | DRG: 292 | End: 2021-03-22
Attending: EMERGENCY MEDICINE | Admitting: INTERNAL MEDICINE
Payer: MEDICARE

## 2021-03-19 ENCOUNTER — APPOINTMENT (OUTPATIENT)
Dept: GENERAL RADIOLOGY | Age: 76
DRG: 292 | End: 2021-03-19
Payer: MEDICARE

## 2021-03-19 DIAGNOSIS — I50.9 CONGESTIVE HEART FAILURE, UNSPECIFIED HF CHRONICITY, UNSPECIFIED HEART FAILURE TYPE (HCC): ICD-10-CM

## 2021-03-19 DIAGNOSIS — I50.43 CHF (CONGESTIVE HEART FAILURE), NYHA CLASS I, ACUTE ON CHRONIC, COMBINED (HCC): ICD-10-CM

## 2021-03-19 DIAGNOSIS — R06.09 DYSPNEA ON EXERTION: ICD-10-CM

## 2021-03-19 DIAGNOSIS — J44.1 COPD EXACERBATION (HCC): Primary | ICD-10-CM

## 2021-03-19 LAB
ALBUMIN SERPL-MCNC: 4 G/DL (ref 3.5–4.6)
ALP BLD-CCNC: 131 U/L (ref 40–130)
ALT SERPL-CCNC: 12 U/L (ref 0–33)
ANION GAP SERPL CALCULATED.3IONS-SCNC: 11 MEQ/L (ref 9–15)
AST SERPL-CCNC: 11 U/L (ref 0–35)
BACTERIA: ABNORMAL /HPF
BASOPHILS ABSOLUTE: 0.1 K/UL (ref 0–0.2)
BASOPHILS RELATIVE PERCENT: 1.2 %
BILIRUB SERPL-MCNC: 0.5 MG/DL (ref 0.2–0.7)
BILIRUBIN URINE: NEGATIVE
BLOOD, URINE: NEGATIVE
BUN BLDV-MCNC: 19 MG/DL (ref 8–23)
CALCIUM SERPL-MCNC: 9.3 MG/DL (ref 8.5–9.9)
CHLORIDE BLD-SCNC: 102 MEQ/L (ref 95–107)
CLARITY: CLEAR
CO2: 25 MEQ/L (ref 20–31)
COLOR: YELLOW
CREAT SERPL-MCNC: 0.81 MG/DL (ref 0.5–0.9)
EKG ATRIAL RATE: 101 BPM
EKG P AXIS: 50 DEGREES
EKG P-R INTERVAL: 172 MS
EKG Q-T INTERVAL: 390 MS
EKG QRS DURATION: 110 MS
EKG QTC CALCULATION (BAZETT): 505 MS
EKG R AXIS: -33 DEGREES
EKG T AXIS: 114 DEGREES
EKG VENTRICULAR RATE: 101 BPM
EOSINOPHILS ABSOLUTE: 0.2 K/UL (ref 0–0.7)
EOSINOPHILS RELATIVE PERCENT: 2.8 %
EPITHELIAL CELLS, UA: ABNORMAL /HPF
GFR AFRICAN AMERICAN: >60
GFR NON-AFRICAN AMERICAN: >60
GLOBULIN: 2.7 G/DL (ref 2.3–3.5)
GLUCOSE BLD-MCNC: 153 MG/DL (ref 60–115)
GLUCOSE BLD-MCNC: 163 MG/DL (ref 70–99)
GLUCOSE BLD-MCNC: 235 MG/DL (ref 60–115)
GLUCOSE BLD-MCNC: 253 MG/DL (ref 60–115)
GLUCOSE URINE: NEGATIVE MG/DL
HCT VFR BLD CALC: 35.7 % (ref 37–47)
HEMOGLOBIN: 11.6 G/DL (ref 12–16)
INR BLD: 1.3
KETONES, URINE: NEGATIVE MG/DL
LEUKOCYTE ESTERASE, URINE: NORMAL
LYMPHOCYTES ABSOLUTE: 1.8 K/UL (ref 1–4.8)
LYMPHOCYTES RELATIVE PERCENT: 26.5 %
MAGNESIUM: 2 MG/DL (ref 1.7–2.4)
MCH RBC QN AUTO: 30.6 PG (ref 27–31.3)
MCHC RBC AUTO-ENTMCNC: 32.7 % (ref 33–37)
MCV RBC AUTO: 93.8 FL (ref 82–100)
MONOCYTES ABSOLUTE: 0.6 K/UL (ref 0.2–0.8)
MONOCYTES RELATIVE PERCENT: 8.2 %
NEUTROPHILS ABSOLUTE: 4.2 K/UL (ref 1.4–6.5)
NEUTROPHILS RELATIVE PERCENT: 61.3 %
NITRITE, URINE: NEGATIVE
PDW BLD-RTO: 16.6 % (ref 11.5–14.5)
PERFORMED ON: ABNORMAL
PH UA: 5.5 (ref 5–9)
PLATELET # BLD: 254 K/UL (ref 130–400)
POTASSIUM SERPL-SCNC: 4.5 MEQ/L (ref 3.4–4.9)
PRO-BNP: NORMAL PG/ML
PROTEIN UA: NEGATIVE MG/DL
PROTHROMBIN TIME: 15.9 SEC (ref 12.3–14.9)
RBC # BLD: 3.8 M/UL (ref 4.2–5.4)
RBC UA: ABNORMAL /HPF (ref 0–2)
SARS-COV-2, NAAT: NOT DETECTED
SODIUM BLD-SCNC: 138 MEQ/L (ref 135–144)
SPECIFIC GRAVITY UA: 1.01 (ref 1–1.03)
TOTAL PROTEIN: 6.7 G/DL (ref 6.3–8)
TROPONIN: <0.01 NG/ML (ref 0–0.01)
URINE REFLEX TO CULTURE: NORMAL
UROBILINOGEN, URINE: 0.2 E.U./DL
WBC # BLD: 6.9 K/UL (ref 4.8–10.8)
WBC UA: ABNORMAL /HPF (ref 0–5)

## 2021-03-19 PROCEDURE — 6360000002 HC RX W HCPCS: Performed by: EMERGENCY MEDICINE

## 2021-03-19 PROCEDURE — 94761 N-INVAS EAR/PLS OXIMETRY MLT: CPT

## 2021-03-19 PROCEDURE — 71045 X-RAY EXAM CHEST 1 VIEW: CPT

## 2021-03-19 PROCEDURE — 83880 ASSAY OF NATRIURETIC PEPTIDE: CPT

## 2021-03-19 PROCEDURE — 6370000000 HC RX 637 (ALT 250 FOR IP): Performed by: INTERNAL MEDICINE

## 2021-03-19 PROCEDURE — 85025 COMPLETE CBC W/AUTO DIFF WBC: CPT

## 2021-03-19 PROCEDURE — 1210000000 HC MED SURG R&B

## 2021-03-19 PROCEDURE — 94640 AIRWAY INHALATION TREATMENT: CPT

## 2021-03-19 PROCEDURE — 96374 THER/PROPH/DIAG INJ IV PUSH: CPT

## 2021-03-19 PROCEDURE — 96375 TX/PRO/DX INJ NEW DRUG ADDON: CPT

## 2021-03-19 PROCEDURE — 6370000000 HC RX 637 (ALT 250 FOR IP)

## 2021-03-19 PROCEDURE — 85610 PROTHROMBIN TIME: CPT

## 2021-03-19 PROCEDURE — 99284 EMERGENCY DEPT VISIT MOD MDM: CPT

## 2021-03-19 PROCEDURE — 2700000000 HC OXYGEN THERAPY PER DAY

## 2021-03-19 PROCEDURE — 2580000003 HC RX 258: Performed by: INTERNAL MEDICINE

## 2021-03-19 PROCEDURE — 93005 ELECTROCARDIOGRAM TRACING: CPT

## 2021-03-19 PROCEDURE — 80053 COMPREHEN METABOLIC PANEL: CPT

## 2021-03-19 PROCEDURE — 83735 ASSAY OF MAGNESIUM: CPT

## 2021-03-19 PROCEDURE — 6360000002 HC RX W HCPCS: Performed by: INTERNAL MEDICINE

## 2021-03-19 PROCEDURE — 93010 ELECTROCARDIOGRAM REPORT: CPT | Performed by: INTERNAL MEDICINE

## 2021-03-19 PROCEDURE — 87635 SARS-COV-2 COVID-19 AMP PRB: CPT

## 2021-03-19 PROCEDURE — 36415 COLL VENOUS BLD VENIPUNCTURE: CPT

## 2021-03-19 PROCEDURE — 81001 URINALYSIS AUTO W/SCOPE: CPT

## 2021-03-19 PROCEDURE — 6370000000 HC RX 637 (ALT 250 FOR IP): Performed by: EMERGENCY MEDICINE

## 2021-03-19 PROCEDURE — 84484 ASSAY OF TROPONIN QUANT: CPT

## 2021-03-19 RX ORDER — IPRATROPIUM BROMIDE AND ALBUTEROL SULFATE 2.5; .5 MG/3ML; MG/3ML
1 SOLUTION RESPIRATORY (INHALATION)
Status: DISCONTINUED | OUTPATIENT
Start: 2021-03-19 | End: 2021-03-20

## 2021-03-19 RX ORDER — DEXTROSE MONOHYDRATE 50 MG/ML
100 INJECTION, SOLUTION INTRAVENOUS PRN
Status: DISCONTINUED | OUTPATIENT
Start: 2021-03-19 | End: 2021-03-22 | Stop reason: HOSPADM

## 2021-03-19 RX ORDER — ACETAMINOPHEN 325 MG/1
650 TABLET ORAL EVERY 6 HOURS PRN
Status: DISCONTINUED | OUTPATIENT
Start: 2021-03-19 | End: 2021-03-22 | Stop reason: HOSPADM

## 2021-03-19 RX ORDER — DEXTROSE MONOHYDRATE 25 G/50ML
12.5 INJECTION, SOLUTION INTRAVENOUS PRN
Status: DISCONTINUED | OUTPATIENT
Start: 2021-03-19 | End: 2021-03-22 | Stop reason: HOSPADM

## 2021-03-19 RX ORDER — METHYLPREDNISOLONE SODIUM SUCCINATE 125 MG/2ML
125 INJECTION, POWDER, LYOPHILIZED, FOR SOLUTION INTRAMUSCULAR; INTRAVENOUS ONCE
Status: COMPLETED | OUTPATIENT
Start: 2021-03-19 | End: 2021-03-19

## 2021-03-19 RX ORDER — ASPIRIN 81 MG/1
81 TABLET ORAL DAILY
Status: DISCONTINUED | OUTPATIENT
Start: 2021-03-19 | End: 2021-03-19

## 2021-03-19 RX ORDER — SPIRONOLACTONE 25 MG/1
12.5 TABLET ORAL DAILY
Status: DISCONTINUED | OUTPATIENT
Start: 2021-03-19 | End: 2021-03-22 | Stop reason: HOSPADM

## 2021-03-19 RX ORDER — POLYETHYLENE GLYCOL 3350 17 G/17G
17 POWDER, FOR SOLUTION ORAL DAILY PRN
Status: DISCONTINUED | OUTPATIENT
Start: 2021-03-19 | End: 2021-03-22 | Stop reason: HOSPADM

## 2021-03-19 RX ORDER — FUROSEMIDE 10 MG/ML
40 INJECTION INTRAMUSCULAR; INTRAVENOUS ONCE
Status: COMPLETED | OUTPATIENT
Start: 2021-03-19 | End: 2021-03-19

## 2021-03-19 RX ORDER — MAGNESIUM SULFATE 1 G/100ML
1000 INJECTION INTRAVENOUS ONCE
Status: COMPLETED | OUTPATIENT
Start: 2021-03-19 | End: 2021-03-19

## 2021-03-19 RX ORDER — INSULIN GLARGINE 100 [IU]/ML
20 INJECTION, SOLUTION SUBCUTANEOUS NIGHTLY
Status: DISCONTINUED | OUTPATIENT
Start: 2021-03-19 | End: 2021-03-22 | Stop reason: HOSPADM

## 2021-03-19 RX ORDER — NICOTINE POLACRILEX 4 MG
15 LOZENGE BUCCAL PRN
Status: DISCONTINUED | OUTPATIENT
Start: 2021-03-19 | End: 2021-03-22 | Stop reason: HOSPADM

## 2021-03-19 RX ORDER — FUROSEMIDE 10 MG/ML
20 INJECTION INTRAMUSCULAR; INTRAVENOUS EVERY 6 HOURS
Status: DISCONTINUED | OUTPATIENT
Start: 2021-03-19 | End: 2021-03-21

## 2021-03-19 RX ORDER — POTASSIUM CHLORIDE 750 MG/1
10 TABLET, EXTENDED RELEASE ORAL DAILY
Status: DISCONTINUED | OUTPATIENT
Start: 2021-03-19 | End: 2021-03-22 | Stop reason: HOSPADM

## 2021-03-19 RX ORDER — ONDANSETRON 2 MG/ML
4 INJECTION INTRAMUSCULAR; INTRAVENOUS EVERY 6 HOURS PRN
Status: DISCONTINUED | OUTPATIENT
Start: 2021-03-19 | End: 2021-03-22 | Stop reason: HOSPADM

## 2021-03-19 RX ORDER — SODIUM CHLORIDE 0.9 % (FLUSH) 0.9 %
10 SYRINGE (ML) INJECTION PRN
Status: DISCONTINUED | OUTPATIENT
Start: 2021-03-19 | End: 2021-03-22 | Stop reason: HOSPADM

## 2021-03-19 RX ORDER — ACETAMINOPHEN 650 MG/1
650 SUPPOSITORY RECTAL EVERY 6 HOURS PRN
Status: DISCONTINUED | OUTPATIENT
Start: 2021-03-19 | End: 2021-03-22 | Stop reason: HOSPADM

## 2021-03-19 RX ORDER — ACETAMINOPHEN 325 MG/1
650 TABLET ORAL EVERY 6 HOURS PRN
Status: DISCONTINUED | OUTPATIENT
Start: 2021-03-19 | End: 2021-03-19

## 2021-03-19 RX ORDER — LOSARTAN POTASSIUM 25 MG/1
12.5 TABLET ORAL 2 TIMES DAILY
Status: DISCONTINUED | OUTPATIENT
Start: 2021-03-19 | End: 2021-03-22 | Stop reason: HOSPADM

## 2021-03-19 RX ORDER — LOSARTAN POTASSIUM 25 MG/1
12.5 TABLET ORAL 2 TIMES DAILY
Status: ON HOLD | COMMUNITY
End: 2021-03-22 | Stop reason: HOSPADM

## 2021-03-19 RX ORDER — IPRATROPIUM BROMIDE AND ALBUTEROL SULFATE 2.5; .5 MG/3ML; MG/3ML
SOLUTION RESPIRATORY (INHALATION)
Status: COMPLETED
Start: 2021-03-19 | End: 2021-03-19

## 2021-03-19 RX ORDER — PROMETHAZINE HYDROCHLORIDE 12.5 MG/1
12.5 TABLET ORAL EVERY 6 HOURS PRN
Status: DISCONTINUED | OUTPATIENT
Start: 2021-03-19 | End: 2021-03-22 | Stop reason: HOSPADM

## 2021-03-19 RX ORDER — ALBUTEROL SULFATE 2.5 MG/3ML
2.5 SOLUTION RESPIRATORY (INHALATION) EVERY 4 HOURS PRN
Status: DISCONTINUED | OUTPATIENT
Start: 2021-03-19 | End: 2021-03-22 | Stop reason: HOSPADM

## 2021-03-19 RX ORDER — ASPIRIN 81 MG/1
81 TABLET ORAL NIGHTLY
Status: DISCONTINUED | OUTPATIENT
Start: 2021-03-19 | End: 2021-03-22 | Stop reason: HOSPADM

## 2021-03-19 RX ORDER — SODIUM CHLORIDE 0.9 % (FLUSH) 0.9 %
10 SYRINGE (ML) INJECTION EVERY 12 HOURS SCHEDULED
Status: DISCONTINUED | OUTPATIENT
Start: 2021-03-19 | End: 2021-03-22 | Stop reason: HOSPADM

## 2021-03-19 RX ADMIN — FUROSEMIDE 40 MG: 10 INJECTION, SOLUTION INTRAMUSCULAR; INTRAVENOUS at 08:39

## 2021-03-19 RX ADMIN — SPIRONOLACTONE 12.5 MG: 25 TABLET ORAL at 11:29

## 2021-03-19 RX ADMIN — MAGNESIUM SULFATE HEPTAHYDRATE 1000 MG: 1 INJECTION, SOLUTION INTRAVENOUS at 09:42

## 2021-03-19 RX ADMIN — IPRATROPIUM BROMIDE AND ALBUTEROL SULFATE 1 AMPULE: .5; 2.5 SOLUTION RESPIRATORY (INHALATION) at 12:15

## 2021-03-19 RX ADMIN — LOSARTAN POTASSIUM 12.5 MG: 25 TABLET, FILM COATED ORAL at 11:30

## 2021-03-19 RX ADMIN — CARVEDILOL 37.5 MG: 25 TABLET, FILM COATED ORAL at 17:56

## 2021-03-19 RX ADMIN — FUROSEMIDE 20 MG: 10 INJECTION, SOLUTION INTRAMUSCULAR; INTRAVENOUS at 11:29

## 2021-03-19 RX ADMIN — FUROSEMIDE 20 MG: 10 INJECTION, SOLUTION INTRAMUSCULAR; INTRAVENOUS at 23:40

## 2021-03-19 RX ADMIN — CARVEDILOL 37.5 MG: 25 TABLET, FILM COATED ORAL at 11:30

## 2021-03-19 RX ADMIN — IPRATROPIUM BROMIDE AND ALBUTEROL SULFATE 3 ML: .5; 2.5 SOLUTION RESPIRATORY (INHALATION) at 09:34

## 2021-03-19 RX ADMIN — SERTRALINE HYDROCHLORIDE 25 MG: 50 TABLET ORAL at 11:29

## 2021-03-19 RX ADMIN — Medication 10 ML: at 17:58

## 2021-03-19 RX ADMIN — POTASSIUM CHLORIDE 10 MEQ: 10 TABLET, EXTENDED RELEASE ORAL at 17:57

## 2021-03-19 RX ADMIN — Medication 10 ML: at 20:33

## 2021-03-19 RX ADMIN — IPRATROPIUM BROMIDE AND ALBUTEROL SULFATE 1 AMPULE: .5; 2.5 SOLUTION RESPIRATORY (INHALATION) at 09:34

## 2021-03-19 RX ADMIN — ASPIRIN 81 MG: 81 TABLET, FILM COATED ORAL at 20:33

## 2021-03-19 RX ADMIN — IPRATROPIUM BROMIDE AND ALBUTEROL SULFATE 1 AMPULE: .5; 2.5 SOLUTION RESPIRATORY (INHALATION) at 16:01

## 2021-03-19 RX ADMIN — IPRATROPIUM BROMIDE AND ALBUTEROL SULFATE 1 AMPULE: .5; 2.5 SOLUTION RESPIRATORY (INHALATION) at 19:57

## 2021-03-19 RX ADMIN — FUROSEMIDE 20 MG: 10 INJECTION, SOLUTION INTRAMUSCULAR; INTRAVENOUS at 17:56

## 2021-03-19 RX ADMIN — METHYLPREDNISOLONE SODIUM SUCCINATE 125 MG: 125 INJECTION, POWDER, FOR SOLUTION INTRAMUSCULAR; INTRAVENOUS at 09:08

## 2021-03-19 RX ADMIN — INSULIN GLARGINE 20 UNITS: 100 INJECTION, SOLUTION SUBCUTANEOUS at 20:33

## 2021-03-19 ASSESSMENT — ENCOUNTER SYMPTOMS
DIARRHEA: 0
SORE THROAT: 0
WHEEZING: 0
BLOOD IN STOOL: 0
CHOKING: 0
BACK PAIN: 0
EYE REDNESS: 0
VOMITING: 0
SINUS PRESSURE: 0
CHEST TIGHTNESS: 0
CONSTIPATION: 0
COUGH: 0
EYE PAIN: 0
VOICE CHANGE: 0
EYE DISCHARGE: 0
ABDOMINAL PAIN: 0
STRIDOR: 0
FACIAL SWELLING: 0
SHORTNESS OF BREATH: 1
TROUBLE SWALLOWING: 0

## 2021-03-19 ASSESSMENT — PAIN SCALES - GENERAL: PAINLEVEL_OUTOF10: 0

## 2021-03-19 NOTE — ED PROVIDER NOTES
[facility] ED  eMERGENCY dEPARTMENT eNCOUnter      Pt Name: Kimberley Singh  MRN: [id number]  [place] 1945  Date of evaluation: 3/19/2021  Provider: Supriya Gan MD    [address] [place] [address]       Chief Complaint   Patient presents with    Shortness of Breath     History of CHF. Has increasing shortness of breath. HISTORY OF PRESENT ILLNESS   (Location/Symptom, Timing/Onset,Context/Setting, Quality, Duration, Modifying Factors, Severity)  Note limiting factors. Kimberley Singh is a 77 y.o. female who presents to the emergency department patient well-known to us from previous multiple encounters this emergency for similar situation the past patient has a chronic lymphedema recurrent CHF poor ejection fracture recently undergone nuclear thallium testing as per patient peripheral vascular disease obesity and history of DVT on chronic blood thinners take Xarelto every day as per patient no fever no chills no corona no change in taste or smell patient lives with her sister at this time short of breath is more worse on ambulation or mild exertion takes walker at home patient has no chest pain no fever no chills patient did receive her both corona vaccination    HPI    NursingNotes were reviewed. REVIEW OF SYSTEMS    (2-9 systems for level 4, 10 or more for level 5)     Review of Systems   Constitutional: Negative. Negative for activity change and fever. HENT: Negative for congestion, drooling, facial swelling, mouth sores, nosebleeds, sinus pressure, sore throat, trouble swallowing and voice change. Eyes: Negative for pain, discharge, redness and visual disturbance. Respiratory: Positive for shortness of breath. Negative for cough, choking, chest tightness, wheezing and stridor. Cardiovascular: Positive for leg swelling. Negative for chest pain and palpitations. Gastrointestinal: Negative for abdominal pain, blood in stool, constipation, diarrhea and vomiting.    Endocrine: Negative for cold intolerance, polyphagia and polyuria. Genitourinary: Negative for dysuria, flank pain, frequency, genital sores and urgency. Musculoskeletal: Negative for back pain, joint swelling, neck pain and neck stiffness. Skin: Negative for pallor and rash. Neurological: Negative for tremors, seizures, syncope, weakness, numbness and headaches. Hematological: Negative for adenopathy. Does not bruise/bleed easily. Psychiatric/Behavioral: Negative for agitation, behavioral problems, hallucinations and sleep disturbance. The patient is not hyperactive. All other systems reviewed and are negative. Except as noted above the remainder of the review of systems was reviewed and negative.        PAST MEDICAL HISTORY     Past Medical History:   Diagnosis Date    Cancer [facility])     bladder, breast,     CHF (congestive heart failure) (HCC)     Hyperlipidemia     Hypertension     Osteoarthritis     Type II or unspecified type diabetes mellitus without mention of complication, not stated as uncontrolled          SURGICALHISTORY       Past Surgical History:   Procedure Laterality Date    BLADDER SURGERY      cancer    BREAST SURGERY      HERNIA REPAIR      HYSTERECTOMY           CURRENT MEDICATIONS       Previous Medications    ACETAMINOPHEN (TYLENOL) 325 MG TABLET    Take 650 mg by mouth every 6 hours as needed    ALBUTEROL SULFATE HFA (PROAIR HFA) 108 (90 BASE) MCG/ACT INHALER    USE 1 INHALATION EVERY 6 HOURS AS NEEDED FOR WHEEZING OR SHORTNESS OF BREATH AS DIRECTED    AMMONIUM LACTATE (LAC-HYDRIN) 12 % LOTION    Apply topically as needed    ASPIRIN (ECOTRIN LOW STRENGTH) 81 MG EC TABLET    Take 81 mg by mouth    CARVEDILOL (COREG) 25 MG TABLET    Take 37.5 mg by mouth 2 times daily (with meals)     FUROSEMIDE (LASIX) 40 MG TABLET    Take 1 tablet by mouth daily    HYDROCHLOROTHIAZIDE (HYDRODIURIL) 25 MG TABLET    Take 1 tablet by mouth daily    INSULIN GLARGINE (LANTUS SOLOSTAR) 100 UNIT/ML INJECTION PEN INJECT 22 UNITS AT BEDTIME    INSULIN LISPRO (HUMALOG KWIKPEN) 100 UNIT/ML PEN    Inject 6 to 8 units before breakfast, 8 units with lunch and 12 units at dinner    LOSARTAN (COZAAR) 25 MG TABLET    Take 12.5 mg by mouth 2 times daily    POTASSIUM CHLORIDE (MICRO-K) 10 MEQ EXTENDED RELEASE CAPSULE    TAKE 1 TABLET TWICE A DAY    RIVAROXABAN (XARELTO) 20 MG TABS TABLET    Take 1 tablet by mouth daily (with breakfast)    SERTRALINE (ZOLOFT) 25 MG TABLET    Take 25 mg by mouth daily    SPIRONOLACTONE (ALDACTONE) 25 MG TABLET    Take 0.5 tablets by mouth daily    SULFASALAZINE (AZULFIDINE) 500 MG TABLET    Take 1,500 mg by mouth 2 times daily       ALLERGIES     Rocephin [ceftriaxone sodium], Clindamycin, Clindamycin/lincomycin, Minocycline, Tetracyclines & related, and Ceftriaxone    FAMILY HISTORY     History reviewed. No pertinent family history.        SOCIAL HISTORY       Social History     Socioeconomic History    Marital status: Single     Spouse name: None    Number of children: None    Years of education: None    Highest education level: None   Occupational History    Occupation:    Social Needs    Financial resource strain: None    Food insecurity     Worry: None     Inability: None    Transportation needs     Medical: None     Non-medical: None   Tobacco Use    Smoking status: Former Smoker    Smokeless tobacco: Never Used    Tobacco comment: quit 40 years ago   Substance and Sexual Activity    Alcohol use: No    Drug use: No    Sexual activity: None   Lifestyle    Physical activity     Days per week: None     Minutes per session: None    Stress: None   Relationships    Social connections     Talks on phone: None     Gets together: None     Attends Baptism service: None     Active member of club or organization: None     Attends meetings of clubs or organizations: None     Relationship status: None    Intimate partner violence     Fear of current or ex partner: None     Emotionally abused: None     Physically abused: None     Forced sexual activity: None   Other Topics Concern    None   Social History Narrative    None       SCREENINGS      @FLOW([id number])@      PHYSICAL EXAM    (up to 7 for level 4, 8 or more for level 5)     ED Triage Vitals [03/19/21 0742]   BP Temp Temp src Pulse Resp SpO2 Height Weight   127/80 97.4 °F (36.3 °C) -- 93 20 96 % 5' (1.524 m) 250 lb (113.4 kg)       Physical Exam  Vitals signs and nursing note reviewed. Constitutional:       Appearance: She is well-developed. She is obese. Comments: Alert cooperative patient slightly anxious obesity noted patient talks in full sentences nontoxic appearance afebrile   HENT:      Head: Normocephalic. Mouth/Throat:      Mouth: Mucous membranes are moist.   Eyes:      Pupils: Pupils are equal, round, and reactive to light. Neck:      Musculoskeletal: Neck supple. Thyroid: No thyromegaly. Vascular: No hepatojugular reflux. Trachea: No tracheal deviation. Cardiovascular:      Rate and Rhythm: Normal rate and regular rhythm. Pulses: No decreased pulses. Heart sounds: Normal heart sounds. No murmur. No friction rub. No gallop. Pulmonary:      Effort: Pulmonary effort is normal. No respiratory distress. Breath sounds: Examination of the right-lower field reveals wheezing and rhonchi. Examination of the left-lower field reveals wheezing and rhonchi. Wheezing and rhonchi present. Chest:      Chest wall: No mass, deformity, tenderness or crepitus. There is no dullness to percussion. Abdominal:      General: Bowel sounds are normal.      Palpations: Abdomen is soft. There is no mass. Tenderness: There is no rebound. Musculoskeletal: Normal range of motion. General: No tenderness. Right lower leg: Edema present. Left lower leg: Edema present. Skin:     General: Skin is warm. Capillary Refill: Capillary refill takes less than 2 seconds.       Findings: No MD  03/19/21 5156

## 2021-03-19 NOTE — Clinical Note
Patient Class: Inpatient [101]   REQUIRED: Diagnosis: CHF (congestive heart failure), NYHA class I, acute on chronic, combined ([facility].) [[id number]]   Estimated Length of Stay: Estimated stay of more than 2 midnights   Future Attending Provider: Theodor Gaucher [[id number]]   Telemetry Bed Required?: Yes

## 2021-03-19 NOTE — H&P
Hospital Medicine  History and Physical    Patient:  Tomas Blum  MRN: [id number]    CHIEF COMPLAINT:    Chief Complaint   Patient presents with    Shortness of Breath     History of CHF. Has increasing shortness of breath. History Obtained From:  Patient, EMR  Primary Care Physician: Staci Gao MD    HISTORY OF PRESENT ILLNESS:   The patient is a 77 y.o. female with PMH of cardiomyopathy with ejection fraction of 25% who presents with increased shortness of breath and dyspnea on exertion. No chest pain or palpitation. No fever or chills. No headaches, loss of consciousness or seizure. Past Medical History:      Diagnosis Date    Cancer [facility])     bladder, breast,     CHF (congestive heart failure) (HCC)     Hyperlipidemia     Hypertension     Osteoarthritis     Type II or unspecified type diabetes mellitus without mention of complication, not stated as uncontrolled        Past Surgical History:      Procedure Laterality Date    BLADDER SURGERY      cancer    BREAST SURGERY      HERNIA REPAIR      HYSTERECTOMY         Medications Prior to Admission:    Prior to Admission medications    Medication Sig Start Date End Date Taking?  Authorizing Provider   losartan (COZAAR) 25 MG tablet Take 12.5 mg by mouth 2 times daily   Yes Historical Provider, MD   acetaminophen (TYLENOL) 325 MG tablet Take 650 mg by mouth every 6 hours as needed 8/13/20  Yes Historical Provider, MD   sertraline (ZOLOFT) 25 MG tablet Take 25 mg by mouth daily 12/28/20  Yes Historical Provider, MD   spironolactone (ALDACTONE) 25 MG tablet Take 0.5 tablets by mouth daily 1/5/21  Yes Bernardo Montenegro MD   rivaroxaban (XARELTO) 20 MG TABS tablet Take 1 tablet by mouth daily (with breakfast) 12/17/20  Yes Shyanne Patricia MD   ammonium lactate (LAC-HYDRIN) 12 % lotion Apply topically as needed 8/8/16  Yes Historical Provider, MD   albuterol sulfate HFA (PROAIR HFA) 108 (90 Base) MCG/ACT inhaler USE 1 INHALATION EVERY 6 HOURS AS NEEDED FOR WHEEZING OR SHORTNESS OF BREATH AS DIRECTED 10/19/15  Yes Historical Provider, MD   sulfaSALAzine (AZULFIDINE) 500 MG tablet Take 1,500 mg by mouth 2 times daily   Yes Historical Provider, MD   potassium chloride (MICRO-K) 10 MEQ extended release capsule TAKE 1 TABLET TWICE A DAY 6/24/19  Yes Historical Provider, MD   aspirin (ECOTRIN LOW STRENGTH) 81 MG EC tablet Take 81 mg by mouth 3/16/11  Yes Historical Provider, MD   insulin glargine (LANTUS SOLOSTAR) 100 UNIT/ML injection pen INJECT 22 UNITS AT BEDTIME 1/9/17  Yes Historical Provider, MD   insulin lispro (HUMALOG KWIKPEN) 100 UNIT/ML pen Inject 6 to 8 units before breakfast, 8 units with lunch and 12 units at dinner 3/5/16  Yes Historical Provider, MD   furosemide (LASIX) 40 MG tablet Take 1 tablet by mouth daily  Patient taking differently: Take 20 mg by mouth daily  12/17/20   Chapo Morales MD   hydroCHLOROthiazide (HYDRODIURIL) 25 MG tablet Take 1 tablet by mouth daily 12/17/20   Chapo PrMD teodoro   carvedilol (COREG) 25 MG tablet Take 37.5 mg by mouth 2 times daily (with meals)  6/29/20   Historical Provider, MD       Allergies:  Rocephin [ceftriaxone sodium], Clindamycin, Clindamycin/lincomycin, Minocycline, Tetracyclines & related, and Ceftriaxone    Social History:   TOBACCO:   reports that she has quit smoking. She has never used smokeless tobacco.  ETOH:   reports no history of alcohol use. Family History:   History reviewed. No pertinent family history. REVIEW OF SYSTEMS:  Ten systems reviewed and negative except for stated in HPI    Physical Exam:    Vitals: /80   Pulse 93   Temp 97.4 °F (36.3 °C)   Resp 20   Ht 5' (1.524 m)   Wt 250 lb (113.4 kg)   SpO2 96%   BMI 48.82 kg/m²   General appearance: alert, appears stated age and cooperative, mild acute distress, morbidly obese. Skin: Skin color, texture, turgor normal. No rashes or lesions  HEENT: Head: Normocephalic, no lesions, without obvious abnormality.   Neck: no adenopathy, no carotid bruit, no JVD, supple, symmetrical, trachea midline and thyroid not enlarged, symmetric, no tenderness/mass/nodules  Lungs: Bilateral diminished breath sounds and crackles. Heart: regular rate and rhythm, S1, S2 normal, no murmur, click, rub or gallop  Abdomen: Soft, increased abdominal girth, midline scar, no tenderness. I cannot exclude the possibility of intra-abdominal mass or organomegaly. Extremities: Trace pitting edema. Bilateral hyperpigmentation. Bilateral thickening of skin. Neurologic: Mental status: Alert, oriented, thought content appropriate     Recent Labs     03/19/21 0757   WBC 6.9   HGB 11.6*        Recent Labs     03/19/21 0757      K 4.5      CO2 25   BUN 19   CREATININE 0.81   GLUCOSE 163*   AST 11   ALT 12   BILITOT 0.5   ALKPHOS 131*     Troponin T:   Recent Labs     03/19/21 0757   TROPONINI <0.010       ABGs: No results found for: PHART, PO2ART, VYM9JXC  INR:   Recent Labs     03/19/21 0757   INR 1.3     URINALYSIS:No results for input(s): NITRITE, COLORU, PHUR, LABCAST, WBCUA, RBCUA, MUCUS, TRICHOMONAS, YEAST, BACTERIA, CLARITYU, SPECGRAV, LEUKOCYTESUR, UROBILINOGEN, BILIRUBINUR, BLOODU, GLUCOSEU, AMORPHOUS in the last 72 hours. Invalid input(s): Clearnce Myers  -----------------------------------------------------------------   Xr Chest Portable    Result Date: 3/19/2021  COMPARISON: December 7, 2020. HISTORY: sob  h/o  chf  obesity  copd TECHNIQUE: AP view FINDINGS: The cardiac silhouette is mildly enlarged. Mild prominence of the pulmonary vasculature is seen in the lungs are also hyperinflated. Overall findings are consistent with congestive heart failure. Surgical clips are seen in the left axillary region. Degenerative changes are seen in the spine. Mild CHF with COPD. Assessment and Plan   The following assessment and plan may or may not be complete or conclusive at this time.     *Acute decompensated systolic and diastolic heart failure. Cardiomyopathy, ejection fraction 25%. *Hypertension. *Obesity. *Chronic lymphedema. *Peripheral vascular disease. *History of DVT. Plan:  I had accepted to admit the patient to the medical floor. I started the patient on diuretics. Continue Aldactone, Xarelto and Cozaar. Continue Coreg. Input and output measurement. Patient is status is a dynamic and evolutionary therefore the aforementioned assessment and plan may or may not be complete or conclusive at this time. Additional work-up, investigation and therapeutic intervention will be determined based on the clinical progression and a follow-up test result. 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    Skin growth D49.2    Venous stasis ulcer of right calf with fat layer exposed without varicose veins (HCC) I87.2, K97.866    PAD (peripheral artery disease) ([facility]) I73.9    DVT, lower extremity, distal, acute, bilateral ([facility]) I82.4Z3    CHF (congestive heart failure), NYHA class I, acute on chronic, combined [facility] I50.43       Cris Alexandra MD  Admitting Hospitalist    TTS: 85mins where I focused more than 75% of my attention on rendering care, and planning treatment course for this patient, in addition to talking to RN team, mid levels, consulting with other physicians and following up on labs and imaging. High Risk Readmission Screening Tool Score Noted.      Emergency Contact:

## 2021-03-19 NOTE — ED NOTES
Bedside report given to [employer]. Output for post lasix was 800 ml.      Sherice Martinez RN  03/19/21 7367

## 2021-03-20 ENCOUNTER — APPOINTMENT (OUTPATIENT)
Dept: GENERAL RADIOLOGY | Age: 76
DRG: 292 | End: 2021-03-20
Payer: MEDICARE

## 2021-03-20 LAB
ANION GAP SERPL CALCULATED.3IONS-SCNC: 9 MEQ/L (ref 9–15)
BUN BLDV-MCNC: 26 MG/DL (ref 8–23)
CALCIUM SERPL-MCNC: 8.9 MG/DL (ref 8.5–9.9)
CHLORIDE BLD-SCNC: 101 MEQ/L (ref 95–107)
CO2: 29 MEQ/L (ref 20–31)
CREAT SERPL-MCNC: 1.15 MG/DL (ref 0.5–0.9)
GFR AFRICAN AMERICAN: 55.7
GFR NON-AFRICAN AMERICAN: 46
GLUCOSE BLD-MCNC: 132 MG/DL (ref 60–115)
GLUCOSE BLD-MCNC: 146 MG/DL (ref 60–115)
GLUCOSE BLD-MCNC: 152 MG/DL (ref 70–99)
GLUCOSE BLD-MCNC: 165 MG/DL (ref 60–115)
GLUCOSE BLD-MCNC: 166 MG/DL (ref 60–115)
HCT VFR BLD CALC: 31.7 % (ref 37–47)
HEMOGLOBIN: 10.3 G/DL (ref 12–16)
MCH RBC QN AUTO: 30.9 PG (ref 27–31.3)
MCHC RBC AUTO-ENTMCNC: 32.3 % (ref 33–37)
MCV RBC AUTO: 95.5 FL (ref 82–100)
PDW BLD-RTO: 16.8 % (ref 11.5–14.5)
PERFORMED ON: ABNORMAL
PLATELET # BLD: 191 K/UL (ref 130–400)
POTASSIUM REFLEX MAGNESIUM: 4.5 MEQ/L (ref 3.4–4.9)
RBC # BLD: 3.32 M/UL (ref 4.2–5.4)
SODIUM BLD-SCNC: 139 MEQ/L (ref 135–144)
WBC # BLD: 5.7 K/UL (ref 4.8–10.8)

## 2021-03-20 PROCEDURE — 36415 COLL VENOUS BLD VENIPUNCTURE: CPT

## 2021-03-20 PROCEDURE — 85027 COMPLETE CBC AUTOMATED: CPT

## 2021-03-20 PROCEDURE — 1210000000 HC MED SURG R&B

## 2021-03-20 PROCEDURE — 80048 BASIC METABOLIC PNL TOTAL CA: CPT

## 2021-03-20 PROCEDURE — 6370000000 HC RX 637 (ALT 250 FOR IP): Performed by: INTERNAL MEDICINE

## 2021-03-20 PROCEDURE — 2580000003 HC RX 258: Performed by: INTERNAL MEDICINE

## 2021-03-20 PROCEDURE — 94640 AIRWAY INHALATION TREATMENT: CPT

## 2021-03-20 PROCEDURE — 71046 X-RAY EXAM CHEST 2 VIEWS: CPT

## 2021-03-20 RX ORDER — IPRATROPIUM BROMIDE AND ALBUTEROL SULFATE 2.5; .5 MG/3ML; MG/3ML
1 SOLUTION RESPIRATORY (INHALATION) 3 TIMES DAILY
Status: DISCONTINUED | OUTPATIENT
Start: 2021-03-20 | End: 2021-03-22 | Stop reason: HOSPADM

## 2021-03-20 RX ORDER — CARVEDILOL 12.5 MG/1
12.5 TABLET ORAL 2 TIMES DAILY WITH MEALS
Status: DISCONTINUED | OUTPATIENT
Start: 2021-03-20 | End: 2021-03-22 | Stop reason: HOSPADM

## 2021-03-20 RX ADMIN — ASPIRIN 81 MG: 81 TABLET, FILM COATED ORAL at 20:39

## 2021-03-20 RX ADMIN — POTASSIUM CHLORIDE 10 MEQ: 10 TABLET, EXTENDED RELEASE ORAL at 08:34

## 2021-03-20 RX ADMIN — IPRATROPIUM BROMIDE AND ALBUTEROL SULFATE 1 AMPULE: .5; 2.5 SOLUTION RESPIRATORY (INHALATION) at 09:52

## 2021-03-20 RX ADMIN — RIVAROXABAN 15 MG: 15 TABLET, FILM COATED ORAL at 08:43

## 2021-03-20 RX ADMIN — Medication 10 ML: at 08:37

## 2021-03-20 RX ADMIN — IPRATROPIUM BROMIDE AND ALBUTEROL SULFATE 1 AMPULE: .5; 2.5 SOLUTION RESPIRATORY (INHALATION) at 18:22

## 2021-03-20 RX ADMIN — CARVEDILOL 12.5 MG: 12.5 TABLET, FILM COATED ORAL at 16:44

## 2021-03-20 RX ADMIN — SERTRALINE HYDROCHLORIDE 25 MG: 50 TABLET ORAL at 08:34

## 2021-03-20 RX ADMIN — Medication 10 ML: at 20:43

## 2021-03-20 RX ADMIN — POLYETHYLENE GLYCOL 3350 17 G: 17 POWDER, FOR SOLUTION ORAL at 08:44

## 2021-03-20 RX ADMIN — INSULIN GLARGINE 20 UNITS: 100 INJECTION, SOLUTION SUBCUTANEOUS at 20:45

## 2021-03-20 ASSESSMENT — PAIN SCALES - GENERAL
PAINLEVEL_OUTOF10: 0
PAINLEVEL_OUTOF10: 0

## 2021-03-20 NOTE — PROGRESS NOTES
Patient is feeling better. There is significant improvement of her shortness of breath. No chest pain or palpitation. No abdominal pain, nausea or vomiting. No fever or chills. ROS: 12 system review otherwise is negative for acute signs or symptoms over the last 24 hrs. General appearance: alert, appears stated age and cooperative, No acute distress, morbidly obese. Skin: Skin color, texture, turgor normal. No rashes or lesions  HEENT: Head: Normocephalic, no lesions, without obvious abnormality. Neck: no adenopathy, no carotid bruit, no JVD, supple, symmetrical, trachea midline and thyroid not enlarged, symmetric, no tenderness/mass/nodules  Lungs:  Resolution of the bilateral crackles. Heart: regular rate and rhythm, S1, S2 normal, no murmur, click, rub or gallop  Abdomen: Soft, increased abdominal girth, midline scar, no tenderness. I cannot exclude the possibility of intra-abdominal mass or organomegaly. Extremities: Trace pitting edema. Bilateral hyperpigmentation. Bilateral thickening of skin. Neurologic: Mental status: Alert, oriented, thought content appropriate     Assessment and plan:     *Acute decompensated systolic and diastolic heart failure. Cardiomyopathy, ejection fraction 25%. Patient is almost 2 L negative balance. Slight rise of the creatinine. Acceptable rise at this time for the sake of keeping patient in a euvolemic state.     *Hypertension. Blood pressure is on the low side. Hold off on diuretics and BP meds for 24 to 48 hours.     *Obesity.     *Chronic lymphedema.     *Peripheral vascular disease.      *History of DVT.     Plan:  Hold off on the diuretics and the BP meds due to low blood pressure. Repeat BMP in a.m. Repeat BNP. Repeat chest x-ray. I may or may not have addressed all of this pt symptoms, medical issues, abnormal labs and findings.  Pt will need additional work up, investigation, testing, surveillance, and treatment to be done at a later time and date during this hospitalization or post discharge by PCP and other out pt providers.

## 2021-03-20 NOTE — PROGRESS NOTES
Pt BP 86/53 HR 71 RR 18 temp 98.1 pulse ox 99%  Pt is asymptomatic at this time. Physician notified.     0615 Pt BP rechecked with manual cuff 95/64

## 2021-03-21 LAB
ANION GAP SERPL CALCULATED.3IONS-SCNC: 9 MEQ/L (ref 9–15)
BUN BLDV-MCNC: 31 MG/DL (ref 8–23)
CALCIUM SERPL-MCNC: 8.9 MG/DL (ref 8.5–9.9)
CHLORIDE BLD-SCNC: 100 MEQ/L (ref 95–107)
CO2: 27 MEQ/L (ref 20–31)
CREAT SERPL-MCNC: 0.99 MG/DL (ref 0.5–0.9)
GFR AFRICAN AMERICAN: >60
GFR NON-AFRICAN AMERICAN: 54.7
GLUCOSE BLD-MCNC: 124 MG/DL (ref 60–115)
GLUCOSE BLD-MCNC: 126 MG/DL (ref 60–115)
GLUCOSE BLD-MCNC: 127 MG/DL (ref 60–115)
GLUCOSE BLD-MCNC: 133 MG/DL (ref 70–99)
GLUCOSE BLD-MCNC: 171 MG/DL (ref 60–115)
GLUCOSE BLD-MCNC: 178 MG/DL (ref 60–115)
PERFORMED ON: ABNORMAL
POTASSIUM SERPL-SCNC: 4.4 MEQ/L (ref 3.4–4.9)
PRO-BNP: 7040 PG/ML
SODIUM BLD-SCNC: 136 MEQ/L (ref 135–144)

## 2021-03-21 PROCEDURE — 83880 ASSAY OF NATRIURETIC PEPTIDE: CPT

## 2021-03-21 PROCEDURE — 6360000002 HC RX W HCPCS: Performed by: INTERNAL MEDICINE

## 2021-03-21 PROCEDURE — 6370000000 HC RX 637 (ALT 250 FOR IP): Performed by: INTERNAL MEDICINE

## 2021-03-21 PROCEDURE — 94760 N-INVAS EAR/PLS OXIMETRY 1: CPT

## 2021-03-21 PROCEDURE — 94640 AIRWAY INHALATION TREATMENT: CPT

## 2021-03-21 PROCEDURE — 36415 COLL VENOUS BLD VENIPUNCTURE: CPT

## 2021-03-21 PROCEDURE — 2580000003 HC RX 258: Performed by: INTERNAL MEDICINE

## 2021-03-21 PROCEDURE — 1210000000 HC MED SURG R&B

## 2021-03-21 PROCEDURE — 80048 BASIC METABOLIC PNL TOTAL CA: CPT

## 2021-03-21 RX ORDER — MAGNESIUM CARB/ALUMINUM HYDROX 105-160MG
120 TABLET,CHEWABLE ORAL ONCE
Status: COMPLETED | OUTPATIENT
Start: 2021-03-21 | End: 2021-03-21

## 2021-03-21 RX ORDER — SENNA PLUS 8.6 MG/1
1 TABLET ORAL 2 TIMES DAILY
Status: DISCONTINUED | OUTPATIENT
Start: 2021-03-21 | End: 2021-03-22 | Stop reason: HOSPADM

## 2021-03-21 RX ORDER — FUROSEMIDE 10 MG/ML
20 INJECTION INTRAMUSCULAR; INTRAVENOUS 3 TIMES DAILY
Status: DISCONTINUED | OUTPATIENT
Start: 2021-03-21 | End: 2021-03-21

## 2021-03-21 RX ORDER — FUROSEMIDE 10 MG/ML
20 INJECTION INTRAMUSCULAR; INTRAVENOUS 2 TIMES DAILY
Status: DISCONTINUED | OUTPATIENT
Start: 2021-03-21 | End: 2021-03-22 | Stop reason: HOSPADM

## 2021-03-21 RX ADMIN — CARVEDILOL 12.5 MG: 12.5 TABLET, FILM COATED ORAL at 17:33

## 2021-03-21 RX ADMIN — CARVEDILOL 12.5 MG: 12.5 TABLET, FILM COATED ORAL at 08:57

## 2021-03-21 RX ADMIN — ASPIRIN 81 MG: 81 TABLET, FILM COATED ORAL at 20:47

## 2021-03-21 RX ADMIN — IPRATROPIUM BROMIDE AND ALBUTEROL SULFATE 1 AMPULE: .5; 2.5 SOLUTION RESPIRATORY (INHALATION) at 11:45

## 2021-03-21 RX ADMIN — SERTRALINE HYDROCHLORIDE 25 MG: 50 TABLET ORAL at 08:57

## 2021-03-21 RX ADMIN — RIVAROXABAN 15 MG: 15 TABLET, FILM COATED ORAL at 08:57

## 2021-03-21 RX ADMIN — FUROSEMIDE 20 MG: 10 INJECTION, SOLUTION INTRAMUSCULAR; INTRAVENOUS at 17:35

## 2021-03-21 RX ADMIN — FUROSEMIDE 20 MG: 10 INJECTION, SOLUTION INTRAMUSCULAR; INTRAVENOUS at 10:58

## 2021-03-21 RX ADMIN — Medication 10 ML: at 20:47

## 2021-03-21 RX ADMIN — IPRATROPIUM BROMIDE AND ALBUTEROL SULFATE 1 AMPULE: .5; 2.5 SOLUTION RESPIRATORY (INHALATION) at 18:09

## 2021-03-21 RX ADMIN — INSULIN GLARGINE 20 UNITS: 100 INJECTION, SOLUTION SUBCUTANEOUS at 20:47

## 2021-03-21 RX ADMIN — Medication 10 ML: at 08:58

## 2021-03-21 RX ADMIN — SENNOSIDES 8.6 MG: 8.6 TABLET, FILM COATED ORAL at 10:58

## 2021-03-21 RX ADMIN — IPRATROPIUM BROMIDE AND ALBUTEROL SULFATE 1 AMPULE: .5; 2.5 SOLUTION RESPIRATORY (INHALATION) at 06:20

## 2021-03-21 RX ADMIN — Medication 120 ML: at 10:58

## 2021-03-21 ASSESSMENT — PAIN SCALES - GENERAL
PAINLEVEL_OUTOF10: 0

## 2021-03-21 NOTE — PROGRESS NOTES
Patient is feeling much better. Significant improvement of shortness of breath. No chest pain or palpitation. Patient did not have a bowel movement. No fever or chills. No hematemesis or melena. ROS: 12 system review otherwise is negative for acute signs or symptoms over the last 24 hrs.      General appearance: alert, appears stated age and cooperative, No acute distress, morbidly obese. Skin: Skin color, texture, turgor normal. No rashes or lesions  HEENT: Head: Normocephalic, no lesions, without obvious abnormality. Neck: no adenopathy, no carotid bruit, no JVD, supple, symmetrical, trachea midline and thyroid not enlarged, symmetric, no tenderness/mass/nodules  Lungs:  Resolution of the bilateral crackles. Heart: regular rate and rhythm, S1, S2 normal, no murmur, click, rub or gallop  Abdomen: Soft, increased abdominal girth, midline scar, no tenderness.  I cannot exclude the possibility of intra-abdominal mass or organomegaly. Extremities: Trace pitting edema.  Bilateral hyperpigmentation.  Bilateral thickening of skin. Neurologic: Mental status: Alert, oriented, thought content appropriate      Assessment and plan:      *Acute decompensated systolic and diastolic heart failure.  Cardiomyopathy, ejection fraction 25%. Patient is almost 3 L negative balance. BNP is down. The chest x-ray showed improvement of interstitial edema. Continue gentle diuresis and the potential discharge tomorrow.      *Hypertension. Fair control.     *Obesity.     *Chronic lymphedema.     *Peripheral vascular disease.      *History of DVT. *Constipation.     Plan:  Clinical, radiological and laboratory improvement of CHF. BNP is down, chest x-ray is better, patient feels better. Continue current treatment. I added bowel stimulants and laxatives for constipation. Potential discharge in a.m.

## 2021-03-22 VITALS
SYSTOLIC BLOOD PRESSURE: 118 MMHG | HEART RATE: 72 BPM | WEIGHT: 250 LBS | DIASTOLIC BLOOD PRESSURE: 62 MMHG | BODY MASS INDEX: 49.08 KG/M2 | HEIGHT: 60 IN | RESPIRATION RATE: 19 BRPM | OXYGEN SATURATION: 94 % | TEMPERATURE: 97.3 F

## 2021-03-22 LAB
ANION GAP SERPL CALCULATED.3IONS-SCNC: 8 MEQ/L (ref 9–15)
BUN BLDV-MCNC: 30 MG/DL (ref 8–23)
CALCIUM SERPL-MCNC: 8.8 MG/DL (ref 8.5–9.9)
CHLORIDE BLD-SCNC: 101 MEQ/L (ref 95–107)
CO2: 30 MEQ/L (ref 20–31)
CREAT SERPL-MCNC: 0.75 MG/DL (ref 0.5–0.9)
GFR AFRICAN AMERICAN: >60
GFR NON-AFRICAN AMERICAN: >60
GLUCOSE BLD-MCNC: 122 MG/DL (ref 60–115)
GLUCOSE BLD-MCNC: 87 MG/DL (ref 70–99)
GLUCOSE BLD-MCNC: 93 MG/DL (ref 60–115)
PERFORMED ON: ABNORMAL
PERFORMED ON: NORMAL
POTASSIUM SERPL-SCNC: 3.8 MEQ/L (ref 3.4–4.9)
SODIUM BLD-SCNC: 139 MEQ/L (ref 135–144)

## 2021-03-22 PROCEDURE — 6370000000 HC RX 637 (ALT 250 FOR IP): Performed by: INTERNAL MEDICINE

## 2021-03-22 PROCEDURE — 94640 AIRWAY INHALATION TREATMENT: CPT

## 2021-03-22 PROCEDURE — 36415 COLL VENOUS BLD VENIPUNCTURE: CPT

## 2021-03-22 PROCEDURE — 2580000003 HC RX 258: Performed by: INTERNAL MEDICINE

## 2021-03-22 PROCEDURE — 80048 BASIC METABOLIC PNL TOTAL CA: CPT

## 2021-03-22 PROCEDURE — 6360000002 HC RX W HCPCS: Performed by: INTERNAL MEDICINE

## 2021-03-22 RX ORDER — FUROSEMIDE 40 MG/1
40 TABLET ORAL DAILY
Qty: 60 TABLET | Refills: 3 | Status: SHIPPED | OUTPATIENT
Start: 2021-03-22

## 2021-03-22 RX ORDER — LOSARTAN POTASSIUM 25 MG/1
12.5 TABLET ORAL 2 TIMES DAILY
Qty: 30 TABLET | Refills: 3 | Status: SHIPPED | OUTPATIENT
Start: 2021-03-22

## 2021-03-22 RX ORDER — POTASSIUM CHLORIDE 750 MG/1
10 TABLET, EXTENDED RELEASE ORAL DAILY
Qty: 60 TABLET | Refills: 3 | Status: SHIPPED | OUTPATIENT
Start: 2021-03-23

## 2021-03-22 RX ORDER — CARVEDILOL 12.5 MG/1
12.5 TABLET ORAL 2 TIMES DAILY WITH MEALS
Qty: 60 TABLET | Refills: 3 | Status: SHIPPED | OUTPATIENT
Start: 2021-03-22

## 2021-03-22 RX ADMIN — POTASSIUM CHLORIDE 10 MEQ: 10 TABLET, EXTENDED RELEASE ORAL at 08:41

## 2021-03-22 RX ADMIN — FUROSEMIDE 20 MG: 10 INJECTION, SOLUTION INTRAMUSCULAR; INTRAVENOUS at 08:42

## 2021-03-22 RX ADMIN — CARVEDILOL 12.5 MG: 12.5 TABLET, FILM COATED ORAL at 08:41

## 2021-03-22 RX ADMIN — SENNOSIDES 8.6 MG: 8.6 TABLET, FILM COATED ORAL at 08:42

## 2021-03-22 RX ADMIN — IPRATROPIUM BROMIDE AND ALBUTEROL SULFATE 1 AMPULE: .5; 2.5 SOLUTION RESPIRATORY (INHALATION) at 06:15

## 2021-03-22 RX ADMIN — Medication 10 ML: at 08:43

## 2021-03-22 RX ADMIN — SPIRONOLACTONE 12.5 MG: 25 TABLET ORAL at 08:42

## 2021-03-22 RX ADMIN — IPRATROPIUM BROMIDE AND ALBUTEROL SULFATE 1 AMPULE: .5; 2.5 SOLUTION RESPIRATORY (INHALATION) at 11:12

## 2021-03-22 RX ADMIN — RIVAROXABAN 15 MG: 15 TABLET, FILM COATED ORAL at 08:42

## 2021-03-22 RX ADMIN — SERTRALINE HYDROCHLORIDE 25 MG: 50 TABLET ORAL at 08:41

## 2021-03-22 NOTE — DISCHARGE SUMMARY
Discharge Summary    Patient:  Nikia Griffiths  YOB: 1945    MRN: [id number]   Acct: [de-identified]    Primary Care Physician: Denny Smith MD    Admit date:  3/19/2021    Discharge date:   03/22/21      Discharge Diagnoses:   <principal problem not specified>  Active Problems:    CHF (congestive heart failure), NYHA class I, acute on chronic, combined ([address]  Resolved Problems:    * No resolved hospital problems. *      Admitted for: [facility] Course: patient was admitted and treated for CHF exacerbation/dyspnea. Was able to diuresed down to 3 L, dyspnea markedly improved. After completing her acute stay will be DC home. meds adjusted. Will follow up with cardiology at [facility] for further outpatient management     Consultants:      Discharge Medications:       Medication List      START taking these medications    potassium chloride 10 MEQ extended release tablet  Commonly known as: KLOR-CON M  Take 1 tablet by mouth daily  Start taking on: March 23, 2021  Replaces: potassium chloride 10 MEQ extended release capsule        CHANGE how you take these medications    carvedilol 12.5 MG tablet  Commonly known as: COREG  Take 1 tablet by mouth 2 times daily (with meals)  What changed:   · medication strength  · how much to take     furosemide 40 MG tablet  Commonly known as: LASIX  Take 1 tablet by mouth daily  What changed: how much to take     * rivaroxaban 20 MG Tabs tablet  Commonly known as: XARELTO  Take 1 tablet by mouth daily (with breakfast)  What changed: Another medication with the same name was added. Make sure you understand how and when to take each. * rivaroxaban 15 MG Tabs tablet  Commonly known as: XARELTO  Take 1 tablet by mouth daily (with breakfast)  Start taking on: March 23, 2021  What changed: You were already taking a medication with the same name, and this prescription was added. Make sure you understand how and when to take each.          * This list has 2 medication(s) that are the same as other medications prescribed for you. Read the directions carefully, and ask your doctor or other care provider to review them with you.             CONTINUE taking these medications    acetaminophen 325 MG tablet  Commonly known as: TYLENOL     ammonium lactate 12 % lotion  Commonly known as: LAC-HYDRIN     Ecotrin Low Strength 81 MG EC tablet  Generic drug: aspirin     HumaLOG KwikPen 100 UNIT/ML pen  Generic drug: insulin lispro     Lantus SoloStar 100 UNIT/ML injection pen  Generic drug: insulin glargine     losartan 25 MG tablet  Commonly known as: COZAAR  Take 0.5 tablets by mouth 2 times daily     ProAir  (90 Base) MCG/ACT inhaler  Generic drug: albuterol sulfate HFA     sertraline 25 MG tablet  Commonly known as: ZOLOFT     spironolactone 25 MG tablet  Commonly known as: ALDACTONE  Take 0.5 tablets by mouth daily     sulfaSALAzine 500 MG tablet  Commonly known as: AZULFIDINE        STOP taking these medications    hydroCHLOROthiazide 25 MG tablet  Commonly known as: HYDRODIURIL     potassium chloride 10 MEQ extended release capsule  Commonly known as: MICRO-K  Replaced by: potassium chloride 10 MEQ extended release tablet           Where to Get Your Medications      These medications were sent to [address] [place] [address] [address] [facility] Pat[facility] [phone]  [address] [place] [address]. [facility]    Phone: [phone]   · carvedilol 12.5 MG tablet  · furosemide 40 MG tablet  · losartan 25 MG tablet  · potassium chloride 10 MEQ extended release tablet  · rivaroxaban 15 MG Tabs tablet         Physical Exam:    Vitals:  Vitals:    03/21/21 1733 03/22/21 0020 03/22/21 0600 03/22/21 0726   BP: 117/65  118/62    Pulse: 98  72    Resp:   19    Temp:   97.3 °F (36.3 °C)    TempSrc:   Oral    SpO2:  95% 93% 94%   Weight:       Height:         Weight: Weight: 250 lb (113.4 kg)     24 hour intake/output:    Intake/Output Summary (Last 24 hours) at 3/22/2021 - 15 mEq/L    Glucose 163 (H) 70 - 99 mg/dL    BUN 19 8 - 23 mg/dL    CREATININE 0.81 0.50 - 0.90 mg/dL    GFR Non-African American >60.0 >60    GFR  >60.0 >60    Calcium 9.3 8.5 - 9.9 mg/dL    Total Protein 6.7 6.3 - 8.0 g/dL    Albumin 4.0 3.5 - 4.6 g/dL    Total Bilirubin 0.5 0.2 - 0.7 mg/dL    Alkaline Phosphatase 131 (H) 40 - 130 U/L    ALT 12 0 - 33 U/L    AST 11 0 - 35 U/L    Globulin 2.7 2.3 - 3.5 g/dL   CBC Auto Differential   Result Value Ref Range    WBC 6.9 4.8 - 10.8 K/uL    RBC 3.80 (L) 4.20 - 5.40 M/uL    Hemoglobin 11.6 (L) 12.0 - 16.0 g/dL    Hematocrit 35.7 (L) 37.0 - 47.0 %    MCV 93.8 82.0 - 100.0 fL    MCH 30.6 27.0 - 31.3 pg    MCHC 32.7 (L) 33.0 - 37.0 %    RDW 16.6 (H) 11.5 - 14.5 %    Platelets 439 869 - 417 K/uL    Neutrophils % 61.3 %    Lymphocytes % 26.5 %    Monocytes % 8.2 %    Eosinophils % 2.8 %    Basophils % 1.2 %    Neutrophils Absolute 4.2 1.4 - 6.5 K/uL    Lymphocytes Absolute 1.8 1.0 - 4.8 K/uL    Monocytes Absolute 0.6 0.2 - 0.8 K/uL    Eosinophils Absolute 0.2 0.0 - 0.7 K/uL    Basophils Absolute 0.1 0.0 - 0.2 K/uL   Magnesium   Result Value Ref Range    Magnesium 2.0 1.7 - 2.4 mg/dL   Troponin   Result Value Ref Range    Troponin <0.010 0.000 - 0.010 ng/mL   Urine Reflex to Culture    Specimen: Urine, clean catch   Result Value Ref Range    Color, UA Yellow Straw/Yellow    Clarity, UA Clear Clear    Glucose, Ur Negative Negative mg/dL    Bilirubin Urine Negative Negative    Ketones, Urine Negative Negative mg/dL    Specific Gravity, UA 1.015 1.005 - 1.030    Blood, Urine Negative Negative    pH, UA 5.5 5.0 - 9.0    Protein, UA Negative Negative mg/dL    Urobilinogen, Urine 0.2 <2.0 E.U./dL    Nitrite, Urine Negative Negative    Leukocyte Esterase, Urine Trace Negative    Urine Reflex to Culture Not Indicated    Protime-INR   Result Value Ref Range    Protime 15.9 (H) 12.3 - 14.9 sec    INR 1.3    Brain Natriuretic Peptide   Result Value Ref Range    Pro-BNP 11,266 pg/mL   Microscopic Urinalysis   Result Value Ref Range    WBC, UA 3-5 0 - 5 /HPF    RBC, UA 0-2 0 - 2 /HPF    Epithelial Cells, UA 0-2 /HPF    Bacteria, UA FEW (A) Negative /HPF   Basic Metabolic Panel w/ Reflex to MG   Result Value Ref Range    Sodium 139 135 - 144 mEq/L    Potassium reflex Magnesium 4.5 3.4 - 4.9 mEq/L    Chloride 101 95 - 107 mEq/L    CO2 29 20 - 31 mEq/L    Anion Gap 9 9 - 15 mEq/L    Glucose 152 (H) 70 - 99 mg/dL    BUN 26 (H) 8 - 23 mg/dL    CREATININE 1.15 (H) 0.50 - 0.90 mg/dL    GFR Non-African American 46.0 (L) >60    GFR  55.7 (L) >60    Calcium 8.9 8.5 - 9.9 mg/dL   CBC   Result Value Ref Range    WBC 5.7 4.8 - 10.8 K/uL    RBC 3.32 (L) 4.20 - 5.40 M/uL    Hemoglobin 10.3 (L) 12.0 - 16.0 g/dL    Hematocrit 31.7 (L) 37.0 - 47.0 %    MCV 95.5 82.0 - 100.0 fL    MCH 30.9 27.0 - 31.3 pg    MCHC 32.3 (L) 33.0 - 37.0 %    RDW 16.8 (H) 11.5 - 14.5 %    Platelets 803 264 - 765 K/uL   Basic Metabolic Panel   Result Value Ref Range    Sodium 136 135 - 144 mEq/L    Potassium 4.4 3.4 - 4.9 mEq/L    Chloride 100 95 - 107 mEq/L    CO2 27 20 - 31 mEq/L    Anion Gap 9 9 - 15 mEq/L    Glucose 133 (H) 70 - 99 mg/dL    BUN 31 (H) 8 - 23 mg/dL    CREATININE 0.99 (H) 0.50 - 0.90 mg/dL    GFR Non-African American 54.7 (L) >60    GFR  >60.0 >60    Calcium 8.9 8.5 - 9.9 mg/dL   Brain Natriuretic Peptide   Result Value Ref Range    Pro-BNP 7,040 pg/mL   Basic Metabolic Panel   Result Value Ref Range    Sodium 139 135 - 144 mEq/L    Potassium 3.8 3.4 - 4.9 mEq/L    Chloride 101 95 - 107 mEq/L    CO2 30 20 - 31 mEq/L    Anion Gap 8 (L) 9 - 15 mEq/L    Glucose 87 70 - 99 mg/dL    BUN 30 (H) 8 - 23 mg/dL    CREATININE 0.75 0.50 - 0.90 mg/dL    GFR Non-African American >60.0 >60    GFR  >60.0 >60    Calcium 8.8 8.5 - 9.9 mg/dL   POCT Glucose   Result Value Ref Range    POC Glucose 153 (H) 60 - 115 mg/dl    Performed on ACCU-CHEK    POCT Glucose   Result Value Ref Range    POC Glucose 235 (H) 60 - 115 mg/dl    Performed on ACCU-CHEK    POCT Glucose   Result Value Ref Range    POC Glucose 253 (H) 60 - 115 mg/dl    Performed on ACCU-CHEK    POCT Glucose   Result Value Ref Range    POC Glucose 146 (H) 60 - 115 mg/dl    Performed on ACCU-CHEK    POCT Glucose   Result Value Ref Range    POC Glucose 166 (H) 60 - 115 mg/dl    Performed on ACCU-CHEK    POCT Glucose   Result Value Ref Range    POC Glucose 165 (H) 60 - 115 mg/dl    Performed on ACCU-CHEK    POCT Glucose   Result Value Ref Range    POC Glucose 132 (H) 60 - 115 mg/dl    Performed on ACCU-CHEK    POCT Glucose   Result Value Ref Range    POC Glucose 124 (H) 60 - 115 mg/dl    Performed on ACCU-CHEK    POCT Glucose   Result Value Ref Range    POC Glucose 127 (H) 60 - 115 mg/dl    Performed on ACCU-CHEK    POCT Glucose   Result Value Ref Range    POC Glucose 178 (H) 60 - 115 mg/dl    Performed on ACCU-CHEK    POCT Glucose   Result Value Ref Range    POC Glucose 171 (H) 60 - 115 mg/dl    Performed on ACCU-CHEK    POCT Glucose   Result Value Ref Range    POC Glucose 126 (H) 60 - 115 mg/dl    Performed on ACCU-CHEK    POCT Glucose   Result Value Ref Range    POC Glucose 93 60 - 115 mg/dl    Performed on ACCU-CHEK    POCT Glucose   Result Value Ref Range    POC Glucose 122 (H) 60 - 115 mg/dl    Performed on ACCU-CHEK    EKG 12 Lead - Chest Pain   Result Value Ref Range    Ventricular Rate 101 BPM    Atrial Rate 101 BPM    P-R Interval 172 ms    QRS Duration 110 ms    Q-T Interval 390 ms    QTc Calculation (Bazett) 505 ms    P Axis 50 degrees    R Axis -33 degrees    T Axis 114 degrees       Diet:  DIET LOW SODIUM 2 GM;     Activity:  Activity as tolerated (Patient may move about with assist as indicated or with supervision.)    Follow-up:  in 1 weeks with Silvana Murguia MD,   Cardiology x 1 week    Disposition: home    Condition: Stable    Time Spent: 50 minutes    Electronically signed by Chapo Morales MD on 3/22/2021 at 1:44 PM    Discharging Hospitalist

## 2021-03-22 NOTE — FLOWSHEET NOTE
Patient is provided with discharge instructions and all questions were answered. Education for new and changed medications provided. Next dose provided for each medications. Iv access removed with no complications. Patient provided with two follow up appointments. Patient is taken to main entrance via wheelchair. Patient picked up by patients sister.  Electronically signed by Gabino Gonzales RN on 3/22/2021 at 3:07 PM

## 2021-03-22 NOTE — DISCHARGE INSTR - COC
Continuity of Care Form    Patient Name: Manda Lion   :  1945  MRN:  [id number]    Admit date:  3/19/2021  Discharge date:  ***    Code Status Order: Full Code   Advance Directives:   Advance Care Flowsheet Documentation       Date/Time Healthcare Directive Type of Healthcare Directive Copy in [address] Agent's Name Healthcare Agent's Phone Number    21 1324  Yes, patient has an advance directive for healthcare treatment  --  No, copy requested from family  --  --  --            Admitting Physician:  Cris Alexandra MD  PCP: Leticia Cristobal MD    Discharging Nurse: [facility] Unit/Room#: 0218/0218-01  Discharging Unit Phone Number: ***    Emergency Contact:   Extended Emergency Contact Information  Primary Emergency Contact: Via Sushant John [address] [place] [address] Phone: [phone]  Work Phone: [phone]  Mobile Phone: [phone]  Relation: Brother/Sister    Past Surgical History:  Past Surgical History:   Procedure Laterality Date    BLADDER SURGERY      cancer    BREAST SURGERY      HERNIA REPAIR      HYSTERECTOMY         Immunization History: There is no immunization history on file for this patient.     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    Skin growth D49.2    Venous stasis ulcer of right calf with fat layer exposed without varicose veins ([facility]) I87.2, H20.190    PAD (peripheral artery disease) ([facility]) I73.9    DVT, lower extremity, distal, acute, bilateral ([facility]) I82.4Z3    CHF (congestive heart failure), NYHA class I, acute on chronic, combined ([facility] [address] I50.43       Isolation/Infection:   Isolation            Contact          Patient Infection Status Infection Onset Added Last Indicated Last Indicated By Review Planned Expiration Resolved Resolved By    MRSA 19 WOUND CULTURE        MRSA Right leg on 2019. Electronically signed by Pebbles Sam RN on 2019 at 7:36 AM     MRSA Right leg on 2019. Electronically signed by Pebbles Sam RN on 2019 at 8:15 AM     Resolved    COVID-19 Rule Out 21 COVID-19, Rapid (Ordered)   21 Rule-Out Test Resulted    INFLUENZA 19 Rapid Influenza A/B Antigens   20             Nurse Assessment:  Last Vital Signs: /62   Pulse 72   Temp 97.3 °F (36.3 °C) (Oral)   Resp 19   Ht 5' (1.524 m)   Wt 250 lb (113.4 kg)   SpO2 94%   BMI 48.82 kg/m²     Last documented pain score (0-10 scale): Pain Level: 0  Last Weight:   Wt Readings from Last 1 Encounters:   21 250 lb (113.4 kg)     Mental Status:  {IP PT MENTAL STATUS::::0}    IV Access:  { SOLANGE IV ACCESS:198219424:::0}    Nursing Mobility/ADLs:  Walking   {CHP DME ADLs:871047366:::0}  Transfer  {CHP DME ADLs:895441306:::0}  Bathing  {CHP DME ADLs:113124432:::0}  Dressing  {CHP DME ADLs:635439970:::0}  Toileting  {CHP DME ADLs:606888688:::0}  Feeding  {CHP DME ADLs:230651559:::0}  Med Admin  {P DME ADLs:999030256:::0}  Med Delivery   { SOLANGE MED Delivery:418680859:::0}    Wound Care Documentation and Therapy:        Elimination:  Continence: Bowel: {YES / VT:74444}  Bladder: {YES / HB:78697}  Urinary Catheter: {Urinary Catheter:926996732:::0}   Colostomy/Ileostomy/Ileal Conduit: {YES / CW:55006}       Date of Last BM: ***    Intake/Output Summary (Last 24 hours) at 3/22/2021 1337  Last data filed at 3/22/2021 0113  Gross per 24 hour   Intake --   Output 100 ml   Net -100 ml     I/O last 3 completed shifts:   In: 10 [I.V.:10]  Out: 100 [Urine:100]    Safety Concerns:     [address] SOLANGE Safety Concerns:268717235:::0}    Impairments/Disabilities:      [address]ARV[address]Z

## 2021-04-16 RX ORDER — SPIRONOLACTONE 25 MG/1
25 TABLET ORAL DAILY
Qty: 30 TABLET | Refills: 1 | Status: SHIPPED | OUTPATIENT
Start: 2021-04-16

## 2021-08-18 ENCOUNTER — HOSPITAL ENCOUNTER (EMERGENCY)
Age: 76
Discharge: HOME | End: 2021-08-19
Attending: EMERGENCY MEDICINE
Payer: MEDICARE

## 2021-08-18 ENCOUNTER — APPOINTMENT (OUTPATIENT)
Dept: GENERAL RADIOLOGY | Age: 76
End: 2021-08-18
Payer: MEDICARE

## 2021-08-18 ENCOUNTER — APPOINTMENT (OUTPATIENT)
Dept: CT IMAGING | Age: 76
End: 2021-08-18
Payer: MEDICARE

## 2021-08-18 DIAGNOSIS — L02.91 ABSCESS: ICD-10-CM

## 2021-08-18 DIAGNOSIS — K59.00 CONSTIPATION, UNSPECIFIED CONSTIPATION TYPE: Primary | ICD-10-CM

## 2021-08-18 LAB
ALBUMIN SERPL-MCNC: 4 G/DL (ref 3.5–4.6)
ALP BLD-CCNC: 127 U/L (ref 40–130)
ALT SERPL-CCNC: 12 U/L (ref 0–33)
ANION GAP SERPL CALCULATED.3IONS-SCNC: 11 MEQ/L (ref 9–15)
AST SERPL-CCNC: 13 U/L (ref 0–35)
BASOPHILS ABSOLUTE: 0.1 K/UL (ref 0–0.1)
BASOPHILS RELATIVE PERCENT: 0.9 % (ref 0.1–1.2)
BILIRUB SERPL-MCNC: 0.4 MG/DL (ref 0.2–0.7)
BILIRUBIN URINE: NEGATIVE
BLOOD, URINE: NEGATIVE
BUN BLDV-MCNC: 29 MG/DL (ref 8–23)
CALCIUM SERPL-MCNC: 9.5 MG/DL (ref 8.5–9.9)
CHLORIDE BLD-SCNC: 94 MEQ/L (ref 95–107)
CLARITY: CLEAR
CO2: 30 MEQ/L (ref 20–31)
COLOR: YELLOW
CREAT SERPL-MCNC: 0.83 MG/DL (ref 0.5–0.9)
EOSINOPHILS ABSOLUTE: 0.1 K/UL (ref 0–0.4)
EOSINOPHILS RELATIVE PERCENT: 2.4 % (ref 0.7–5.8)
GFR AFRICAN AMERICAN: >60
GFR NON-AFRICAN AMERICAN: >60
GLOBULIN: 2.5 G/DL (ref 2.3–3.5)
GLUCOSE BLD-MCNC: 227 MG/DL (ref 70–99)
GLUCOSE URINE: NEGATIVE MG/DL
HCT VFR BLD CALC: 37.1 % (ref 37–47)
HEMOGLOBIN: 12.6 G/DL (ref 11.2–15.7)
IMMATURE GRANULOCYTES #: 0 K/UL
IMMATURE GRANULOCYTES %: 0.2 %
KETONES, URINE: NEGATIVE MG/DL
LEUKOCYTE ESTERASE, URINE: NEGATIVE
LIPASE: 23 U/L (ref 12–95)
LYMPHOCYTES ABSOLUTE: 1.9 K/UL (ref 1.2–3.7)
LYMPHOCYTES RELATIVE PERCENT: 35 %
MCH RBC QN AUTO: 32.2 PG (ref 25.6–32.2)
MCHC RBC AUTO-ENTMCNC: 34 % (ref 32.2–35.5)
MCV RBC AUTO: 94.9 FL (ref 79.4–94.8)
MONOCYTES ABSOLUTE: 0.5 K/UL (ref 0.2–0.9)
MONOCYTES RELATIVE PERCENT: 8.8 % (ref 4.7–12.5)
NEUTROPHILS ABSOLUTE: 2.8 K/UL (ref 1.6–6.1)
NEUTROPHILS RELATIVE PERCENT: 52.7 % (ref 34–71.1)
NITRITE, URINE: NEGATIVE
PDW BLD-RTO: 13.4 % (ref 11.7–14.4)
PH UA: 7 (ref 5–9)
PLATELET # BLD: 210 K/UL (ref 182–369)
POTASSIUM SERPL-SCNC: 3.5 MEQ/L (ref 3.4–4.9)
PROTEIN UA: NEGATIVE MG/DL
RBC # BLD: 3.91 M/UL (ref 3.93–5.22)
SODIUM BLD-SCNC: 135 MEQ/L (ref 135–144)
SPECIFIC GRAVITY UA: 1.01 (ref 1–1.03)
TOTAL PROTEIN: 6.5 G/DL (ref 6.3–8)
UROBILINOGEN, URINE: 0.2 E.U./DL
WBC # BLD: 5.4 K/UL (ref 4–10)

## 2021-08-18 PROCEDURE — 99283 EMERGENCY DEPT VISIT LOW MDM: CPT

## 2021-08-18 PROCEDURE — 74022 RADEX COMPL AQT ABD SERIES: CPT

## 2021-08-18 PROCEDURE — 85025 COMPLETE CBC W/AUTO DIFF WBC: CPT

## 2021-08-18 PROCEDURE — 74176 CT ABD & PELVIS W/O CONTRAST: CPT

## 2021-08-18 PROCEDURE — 80053 COMPREHEN METABOLIC PANEL: CPT

## 2021-08-18 PROCEDURE — 81003 URINALYSIS AUTO W/O SCOPE: CPT

## 2021-08-18 PROCEDURE — 83690 ASSAY OF LIPASE: CPT

## 2021-08-18 RX ORDER — ATORVASTATIN CALCIUM 10 MG/1
10 TABLET, FILM COATED ORAL DAILY
COMMUNITY

## 2021-08-18 RX ORDER — METOLAZONE 2.5 MG/1
2.5 TABLET ORAL
COMMUNITY

## 2021-08-18 ASSESSMENT — PAIN DESCRIPTION - ORIENTATION: ORIENTATION: MID

## 2021-08-18 ASSESSMENT — ENCOUNTER SYMPTOMS
SINUS PAIN: 0
CONSTIPATION: 1
BACK PAIN: 0
SORE THROAT: 0
COLOR CHANGE: 0
NAUSEA: 0
SHORTNESS OF BREATH: 0
VOMITING: 0
DIARRHEA: 0
COUGH: 0
ABDOMINAL PAIN: 0
EYE REDNESS: 0

## 2021-08-18 ASSESSMENT — PAIN DESCRIPTION - FREQUENCY: FREQUENCY: CONTINUOUS

## 2021-08-18 ASSESSMENT — PAIN DESCRIPTION - PAIN TYPE: TYPE: ACUTE PAIN

## 2021-08-18 ASSESSMENT — PAIN SCALES - GENERAL: PAINLEVEL_OUTOF10: 7

## 2021-08-18 ASSESSMENT — PAIN DESCRIPTION - DESCRIPTORS: DESCRIPTORS: PRESSURE

## 2021-08-18 ASSESSMENT — PAIN DESCRIPTION - LOCATION: LOCATION: ABDOMEN;RECTUM

## 2021-08-18 NOTE — ED TRIAGE NOTES
Pt presents to ED with pain in abdomen and constipation x 3 days. Has been taking stool softener and fiber with no relief.

## 2021-08-19 VITALS
RESPIRATION RATE: 22 BRPM | OXYGEN SATURATION: 98 % | WEIGHT: 248 LBS | SYSTOLIC BLOOD PRESSURE: 128 MMHG | TEMPERATURE: 98.3 F | BODY MASS INDEX: 48.69 KG/M2 | DIASTOLIC BLOOD PRESSURE: 68 MMHG | HEART RATE: 78 BPM | HEIGHT: 60 IN

## 2021-08-19 PROCEDURE — 94640 AIRWAY INHALATION TREATMENT: CPT

## 2021-08-19 PROCEDURE — 6370000000 HC RX 637 (ALT 250 FOR IP): Performed by: EMERGENCY MEDICINE

## 2021-08-19 RX ORDER — LEVOFLOXACIN 500 MG/1
500 TABLET, FILM COATED ORAL DAILY
Qty: 5 TABLET | Refills: 0 | Status: SHIPPED | OUTPATIENT
Start: 2021-08-19 | End: 2021-08-24

## 2021-08-19 RX ORDER — LEVOFLOXACIN 500 MG/1
500 TABLET, FILM COATED ORAL ONCE
Status: DISCONTINUED | OUTPATIENT
Start: 2021-08-19 | End: 2021-08-19

## 2021-08-19 RX ORDER — ALBUTEROL SULFATE 90 UG/1
2 AEROSOL, METERED RESPIRATORY (INHALATION) ONCE
Status: COMPLETED | OUTPATIENT
Start: 2021-08-19 | End: 2021-08-19

## 2021-08-19 RX ADMIN — ALBUTEROL SULFATE 2 PUFF: 90 AEROSOL, METERED RESPIRATORY (INHALATION) at 00:11

## 2021-08-19 NOTE — ED PROVIDER NOTES
[facility] ED  EMERGENCY DEPARTMENT ENCOUNTER      Pt Name: Adán Hall  MRN: [id number]  [place] 1945  Date of evaluation: 8/18/2021  Provider: Karen Middleton DO    CHIEF COMPLAINT       Chief Complaint   Patient presents with    Abdominal Pain     Symptoms x 1 week    Constipation     x 1 week     Chief complaint: Abdominal bloating and constipation  History of chief complaint: This 50-year-old female presents the emergency department complaining of feeling bloated like she has to back for bowel movement but unable to go. Patient states her last bowel movement was on Saturday of last week over a week ago. States she did have 1 small bowel movement since but not much. Patient states she has chronic ongoing problems with constipation but usually can go every 3 days or so. Patient denies any abdominal pain but just reports diffuse bloated discomfort. Patient states she has been sitting on the toilet all day but unable to go patient denies any rectal pressure or pain with her efforts. Does not feel stool down at the rectum. 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 Patient states she does take stool softener regularly at bedtime and Metamucil regularly. Patient states that she has had some increased belching no nausea or vomiting no fevers or chills. Patient denies any dark or bloody stool or emesis. No dysuria hematuria frequency or urgency. Patient states she is otherwise been well no cough cold congestion no chest pain palpitation short of breath weak or dizzy. Patient states she does get very anxious about her bowels. Nursing Notes were reviewed. REVIEW OF SYSTEMS    (2-9 systems for level 4, 10 or more for level 5)     Review of Systems   Constitutional: Negative for chills, diaphoresis and fever.    HENT: Negative for congestion, sinus pain and sore throat. Eyes: Negative for redness and visual disturbance. Respiratory: Negative for cough and shortness of breath. Cardiovascular: Negative for chest pain, palpitations and leg swelling. Gastrointestinal: Positive for constipation. Negative for abdominal pain, diarrhea, nausea and vomiting. Genitourinary: Negative for difficulty urinating, dysuria, flank pain and frequency. Musculoskeletal: Negative for back pain and myalgias. Skin: Negative for color change and rash. Neurological: Negative for dizziness, weakness and numbness. Hematological: Negative for adenopathy. Except as noted above the remainder of the review of systems was reviewed and negative.        PAST MEDICAL HISTORY     Past Medical History:   Diagnosis Date    Cancer [facility])     bladder, breast,     CHF (congestive heart failure) (HCC)     Hyperlipidemia     Hypertension     Osteoarthritis     Type II or unspecified type diabetes mellitus without mention of complication, not stated as uncontrolled          SURGICAL HISTORY       Past Surgical History:   Procedure Laterality Date    BLADDER SURGERY      cancer    BREAST SURGERY      HERNIA REPAIR      HYSTERECTOMY           CURRENT MEDICATIONS       Previous Medications    ACETAMINOPHEN (TYLENOL) 325 MG TABLET    Take 650 mg by mouth every 6 hours as needed    ALBUTEROL SULFATE HFA (PROAIR HFA) 108 (90 BASE) MCG/ACT INHALER    USE 1 INHALATION EVERY 6 HOURS AS NEEDED FOR WHEEZING OR SHORTNESS OF BREATH AS DIRECTED    AMMONIUM LACTATE (LAC-HYDRIN) 12 % LOTION    Apply topically as needed    ASPIRIN (ECOTRIN LOW STRENGTH) 81 MG EC TABLET    Take 81 mg by mouth    ATORVASTATIN (LIPITOR) 10 MG TABLET    Take 10 mg by mouth daily    CARVEDILOL (COREG) 12.5 MG TABLET    Take 1 tablet by mouth 2 times daily (with meals)    FUROSEMIDE (LASIX) 40 MG TABLET    Take 1 tablet by mouth daily    INSULIN GLARGINE (LANTUS SOLOSTAR) 100 UNIT/ML INJECTION PEN    INJECT 22 UNITS AT BEDTIME    INSULIN LISPRO (HUMALOG KWIKPEN) 100 UNIT/ML PEN    Inject 6 to 8 units before breakfast, 8 units with lunch and 12 units at dinner    LOSARTAN (COZAAR) 25 MG TABLET    Take 0.5 tablets by mouth 2 times daily    METOLAZONE (ZAROXOLYN) 2.5 MG TABLET    Take 2.5 mg by mouth three times a week    POTASSIUM CHLORIDE (KLOR-CON M) 10 MEQ EXTENDED RELEASE TABLET    Take 1 tablet by mouth daily    RIVAROXABAN (XARELTO) 15 MG TABS TABLET    Take 1 tablet by mouth daily (with breakfast)    SERTRALINE (ZOLOFT) 25 MG TABLET    Take 25 mg by mouth daily    SPIRONOLACTONE (ALDACTONE) 25 MG TABLET    Take 1 tablet by mouth daily    SULFASALAZINE (AZULFIDINE) 500 MG TABLET    Take 1,500 mg by mouth 2 times daily       ALLERGIES     Rocephin [ceftriaxone sodium], Clindamycin, Clindamycin/lincomycin, Minocycline, Tetracyclines & related, and Ceftriaxone    FAMILY HISTORY     History reviewed. No pertinent family history. SOCIAL HISTORY       Social History     Socioeconomic History    Marital status: Single     Spouse name: None    Number of children: None    Years of education: None    Highest education level: None   Occupational History    Occupation:    Tobacco Use    Smoking status: Former Smoker    Smokeless tobacco: Never Used    Tobacco comment: quit 40 years ago   Vaping Use    Vaping Use: Never used   Substance and Sexual Activity    Alcohol use: No    Drug use: No    Sexual activity: None   Other Topics Concern    None   Social History Narrative    None     Social Determinants of Health     Financial Resource Strain:     Difficulty of Paying Living Expenses:    Food Insecurity:     Worried About Running Out of Food in the Last Year:     Ran Out of Food in the Last Year:    Transportation Needs:     Lack of Transportation (Medical):      Lack of Transportation (Non-Medical):    Physical Activity:     Days of Exercise per Week:     Minutes of Exercise per Session:    Stress:     Feeling of Stress :    Social Connections:     Frequency of Communication with Friends and Family:     Frequency of Social Gatherings with Friends and Family:     Attends [employer] Services:     Active Member of Clubs or Organizations:     Attends Club or Organization Meetings:     Marital Status:    Intimate Partner Violence:     Fear of Current or Ex-Partner:     Emotionally Abused:     Physically Abused:     Sexually Abused:          PHYSICAL EXAM    (up to 7 for level 4, 8 or more for level 5)     ED Triage Vitals [08/18/21 1930]   BP Temp Temp Source Pulse Resp SpO2 Height Weight   137/78 98.3 °F (36.8 °C) Oral 91 22 94 % 5' (1.524 m) 248 lb (112.5 kg)       Physical Exam   General appearance: Patient is awake alert interactive appropriate nontoxic in no acute distress  Head is atraumatic normocephalic  Eyes pupils are equal and reactive sclera white conjunctive are pink  Oral pharyngeal cavity is pink with good moisture, no exudates or ulcerations no asymmetry, the airway is widely patent  Neck: Supple no meningeal signs no adenopathy no JVD  Heart: Regular rate and rhythm no gross murmurs rubs or clicks  Lungs: Breath sounds are clear with good air movement throughout no active wheezes rales or rhonchi no respiratory distress  Abdomen: Soft nontender to palpation throughout, there are good bowel sounds no rebound rigidity or guarding no firm or pulsatile masses, no gross distention, femoral pulses full and symmetric. No overlying rash ecchymosis or abrasions. Rectal examination: No perianal irritation no fissures or large hemorrhoids. Digital examination reveals no gross palpable stool within the rectal vault. Hemoccult of secretions is negative  Back: Nontender to palpation no costovertebral angle tenderness  Skin: Warm and dry without rashes  Lower extremities: No edema or calf tenderness or asymmetry.     DIAGNOSTIC RESULTS       RADIOLOGY:   Non-plain film images such as CT, Ultrasound and MRI are read by the radiologist. Plain radiographic images are visualized and preliminarily interpreted by the emergency physician with the below findings:    Abdominal x-ray multiple views interpreted by ED physician indication abdominal bloating: Heart mediastinum are within normal limits lung fields are clear no acute consolidations vascular congestion or pneumothorax appreciated. There is a nonspecific bowel gas pattern there is no air-fluid levels no gross findings of constipation no free air appreciated. Official radiology report is pending    Interpretation per the Radiologist below, if available at the time of this note:    XR ACUTE ABD SERIES CHEST 1 VW    (Results Pending)   CT ABDOMEN PELVIS WO CONTRAST Additional Contrast? None    (Results Pending)     CT scan of the abdomen and pelvis faxed by radiology. Showing anterior abdominal wall scarring from previous surgery with a subcutaneous fluid collection measuring approximately 28 x 16 mm with a small amount of surrounding fat stranding may represent a developing abscess. Postsurgical changes to the small bowel and anterior abdomen without evidence of small bowel obstruction. Mild cardiac enlargement small hiatal hernia granulomatous changes to the spleen extensive cholelithiasis without acute cholecystitis. Atrophic kidneys bilateral with cystic changes DJD      LABS:  Labs Reviewed   COMPREHENSIVE METABOLIC PANEL - Abnormal; Notable for the following components:       Result Value    Chloride 94 (*)     Glucose 227 (*)     BUN 29 (*)     All other components within normal limits   CBC WITH AUTO DIFFERENTIAL - Abnormal; Notable for the following components:    RBC 3.91 (*)     MCV 94.9 (*)     All other components within normal limits   LIPASE   URINALYSIS       All other labs were within normal range or not returned as of this dictation.     EMERGENCY DEPARTMENT COURSE and DIFFERENTIAL DIAGNOSIS/MDM:   Vitals:    Vitals:    08/18/21 1930   BP: 137/78   Pulse: 91   Resp: 22   Temp: 98.3 °F (36.8 °C)   TempSrc: Oral   SpO2: 94%   Weight: 248 lb (112.5 kg)   Height: 5' (1.524 m)     Treatment and course: Initial abdominal series was obtained to see if a simple constipation problem findings are nonspecific. Further work-up was initiated with basic blood work and CT imaging. Patient resting comfortably repeat abdominal examinations remain benign-no palpable masses or fullness no redness correlating to the area of the small fluid collection reported on CT scan.  levaquin 500 mg p.o. was initiated here empirically for the possibility of a small developing subcutaneous abscess. Patient requesting a breathing treatment states she takes 1 at home in the evening and feels like she is starting to wheeze. Repeat auscultation of the lungs reveals fine end expiratory wheeze in the bases bilaterally no respiratory distress albuterol MDI 2 puffs was given here. FINAL IMPRESSION      1. Constipation, unspecified constipation type    2. Abscess          DISPOSITION/PLAN   DISPOSITION Discharge - Pending Orders Complete 08/19/2021 12:23:49 AM  Patient discharged home advised increase fluid continue her stool softener and Metamucil as directed monitor closely return if any change or worsening any abdominal pain fever vomiting dark or bloody stools weak or dizzy. Patient was given a prescription for levaquin for 7 days. Patient to follow-up with primary physician for repeat assessment in the next 2 days. PATIENT REFERRED TO:  Priscilla Mcneal MD  [place] [id number] [address] [address]  [phone]    In 2 days        DISCHARGE MEDICATIONS:  New Prescriptions    LEVOFLOXACIN (LEVAQUIN) 500 MG TABLET    Take 1 tablet by mouth daily for 5 days     Controlled Substances Monitoring:     No flowsheet data found.     (Please note that portions of this note were completed with a voice recognition program.  Efforts were made to edit the dictations but occasionally words are mis-transcribed.)    Russell Liu DO (electronically signed)  Attending Emergency Physician            Russell Liu DO  08/19/21 [address] [facility],   08/19/21 002

## 2021-08-19 NOTE — ED NOTES
Dr. Warren Sandoval notified of patient request for albuterol treatment. Pt. States she usually does one at night. Pt. States she feels like she is starting to wheeze. Dr. Warren Sandoval to bedside to assess patient.      Rose Dominguez RN  08/19/21 [id number]

## 2021-08-19 NOTE — ED NOTES
Pt provided with DC and medication education; verbalized understanding. Pt ambulated from Ed with steady gait and all belongings.       Virgilio Chan RN  08/19/21 0813

## 2022-05-06 ENCOUNTER — APPOINTMENT (OUTPATIENT)
Dept: GENERAL RADIOLOGY | Age: 77
DRG: 813 | End: 2022-05-06
Payer: MEDICARE

## 2022-05-06 ENCOUNTER — APPOINTMENT (OUTPATIENT)
Dept: CT IMAGING | Age: 77
DRG: 813 | End: 2022-05-06
Payer: MEDICARE

## 2022-05-06 ENCOUNTER — HOSPITAL ENCOUNTER (INPATIENT)
Age: 77
LOS: 2 days | Discharge: HOME | DRG: 813 | End: 2022-05-09
Attending: EMERGENCY MEDICINE | Admitting: INTERNAL MEDICINE
Payer: MEDICARE

## 2022-05-06 DIAGNOSIS — D64.9 ACUTE ANEMIA: ICD-10-CM

## 2022-05-06 DIAGNOSIS — R42 DIZZINESS: ICD-10-CM

## 2022-05-06 DIAGNOSIS — N17.9 ACUTE RENAL FAILURE, UNSPECIFIED ACUTE RENAL FAILURE TYPE (HCC): Primary | ICD-10-CM

## 2022-05-06 LAB
ALBUMIN SERPL-MCNC: 3.7 G/DL (ref 3.5–4.6)
ALP BLD-CCNC: 76 U/L (ref 40–130)
ALT SERPL-CCNC: 11 U/L (ref 0–33)
AMYLASE: 23 U/L (ref 22–93)
ANION GAP SERPL CALCULATED.3IONS-SCNC: 15 MEQ/L (ref 9–15)
AST SERPL-CCNC: 11 U/L (ref 0–35)
BASOPHILS ABSOLUTE: 0.1 K/UL (ref 0–0.1)
BASOPHILS RELATIVE PERCENT: 0.7 % (ref 0.1–1.2)
BILIRUB SERPL-MCNC: <0.2 MG/DL (ref 0.2–0.7)
BILIRUBIN URINE: NEGATIVE
BLOOD, URINE: NEGATIVE
BUN BLDV-MCNC: 53 MG/DL (ref 8–23)
CALCIUM SERPL-MCNC: 9.6 MG/DL (ref 8.5–9.9)
CHLORIDE BLD-SCNC: 95 MEQ/L (ref 95–107)
CLARITY: CLEAR
CO2: 28 MEQ/L (ref 20–31)
COLOR: YELLOW
CREAT SERPL-MCNC: 1.16 MG/DL (ref 0.5–0.9)
EOSINOPHILS ABSOLUTE: 0.1 K/UL (ref 0–0.4)
EOSINOPHILS RELATIVE PERCENT: 1.2 % (ref 0.7–5.8)
GFR AFRICAN AMERICAN: 54.9
GFR NON-AFRICAN AMERICAN: 45.4
GLOBULIN: 2.6 G/DL (ref 2.3–3.5)
GLUCOSE BLD-MCNC: 224 MG/DL (ref 70–99)
GLUCOSE URINE: NEGATIVE MG/DL
HCT VFR BLD CALC: 25.3 % (ref 37–47)
HEMOGLOBIN: 8.3 G/DL (ref 11.2–15.7)
IMMATURE GRANULOCYTES #: 0 K/UL
IMMATURE GRANULOCYTES %: 0.5 %
KETONES, URINE: NEGATIVE MG/DL
LEUKOCYTE ESTERASE, URINE: NEGATIVE
LIPASE: 41 U/L (ref 12–95)
LYMPHOCYTES ABSOLUTE: 2.1 K/UL (ref 1.2–3.7)
LYMPHOCYTES RELATIVE PERCENT: 24.5 %
MCH RBC QN AUTO: 30.6 PG (ref 25.6–32.2)
MCHC RBC AUTO-ENTMCNC: 32.8 % (ref 32.2–35.5)
MCV RBC AUTO: 93.4 FL (ref 79.4–94.8)
MONOCYTES ABSOLUTE: 0.7 K/UL (ref 0.2–0.9)
MONOCYTES RELATIVE PERCENT: 8.3 % (ref 4.7–12.5)
NEUTROPHILS ABSOLUTE: 5.5 K/UL (ref 1.6–6.1)
NEUTROPHILS RELATIVE PERCENT: 64.8 % (ref 34–71.1)
NITRITE, URINE: NEGATIVE
PDW BLD-RTO: 14.9 % (ref 11.7–14.4)
PH UA: 6.5 (ref 5–9)
PLATELET # BLD: 216 K/UL (ref 182–369)
POTASSIUM SERPL-SCNC: 3.4 MEQ/L (ref 3.4–4.9)
PRO-BNP: 824 PG/ML
PROTEIN UA: NEGATIVE MG/DL
RBC # BLD: 2.71 M/UL (ref 3.93–5.22)
SODIUM BLD-SCNC: 138 MEQ/L (ref 135–144)
SPECIFIC GRAVITY UA: 1.01 (ref 1–1.03)
TOTAL PROTEIN: 6.3 G/DL (ref 6.3–8)
TROPONIN: <0.01 NG/ML (ref 0–0.01)
URINE REFLEX TO CULTURE: NORMAL
UROBILINOGEN, URINE: 0.2 E.U./DL
WBC # BLD: 8.5 K/UL (ref 4–10)

## 2022-05-06 PROCEDURE — 93005 ELECTROCARDIOGRAM TRACING: CPT

## 2022-05-06 PROCEDURE — P9016 RBC LEUKOCYTES REDUCED: HCPCS

## 2022-05-06 PROCEDURE — G0378 HOSPITAL OBSERVATION PER HR: HCPCS

## 2022-05-06 PROCEDURE — 86850 RBC ANTIBODY SCREEN: CPT

## 2022-05-06 PROCEDURE — 81003 URINALYSIS AUTO W/O SCOPE: CPT

## 2022-05-06 PROCEDURE — 85025 COMPLETE CBC W/AUTO DIFF WBC: CPT

## 2022-05-06 PROCEDURE — 71046 X-RAY EXAM CHEST 2 VIEWS: CPT

## 2022-05-06 PROCEDURE — 86901 BLOOD TYPING SEROLOGIC RH(D): CPT

## 2022-05-06 PROCEDURE — 70450 CT HEAD/BRAIN W/O DYE: CPT

## 2022-05-06 PROCEDURE — 2580000003 HC RX 258: Performed by: EMERGENCY MEDICINE

## 2022-05-06 PROCEDURE — 84484 ASSAY OF TROPONIN QUANT: CPT

## 2022-05-06 PROCEDURE — 83690 ASSAY OF LIPASE: CPT

## 2022-05-06 PROCEDURE — 82150 ASSAY OF AMYLASE: CPT

## 2022-05-06 PROCEDURE — 86900 BLOOD TYPING SEROLOGIC ABO: CPT

## 2022-05-06 PROCEDURE — 80053 COMPREHEN METABOLIC PANEL: CPT

## 2022-05-06 PROCEDURE — 99285 EMERGENCY DEPT VISIT HI MDM: CPT

## 2022-05-06 PROCEDURE — 83880 ASSAY OF NATRIURETIC PEPTIDE: CPT

## 2022-05-06 PROCEDURE — 86923 COMPATIBILITY TEST ELECTRIC: CPT

## 2022-05-06 RX ORDER — POLYETHYLENE GLYCOL 3350 17 G/17G
17 POWDER, FOR SOLUTION ORAL DAILY PRN
Status: DISCONTINUED | OUTPATIENT
Start: 2022-05-06 | End: 2022-05-09 | Stop reason: HOSPADM

## 2022-05-06 RX ORDER — 0.9 % SODIUM CHLORIDE 0.9 %
500 INTRAVENOUS SOLUTION INTRAVENOUS ONCE
Status: COMPLETED | OUTPATIENT
Start: 2022-05-06 | End: 2022-05-06

## 2022-05-06 RX ORDER — 0.9 % SODIUM CHLORIDE 0.9 %
500 INTRAVENOUS SOLUTION INTRAVENOUS ONCE
Status: COMPLETED | OUTPATIENT
Start: 2022-05-07 | End: 2022-05-07

## 2022-05-06 RX ORDER — INSULIN GLARGINE 100 [IU]/ML
15 INJECTION, SOLUTION SUBCUTANEOUS NIGHTLY
Status: DISCONTINUED | OUTPATIENT
Start: 2022-05-07 | End: 2022-05-09 | Stop reason: HOSPADM

## 2022-05-06 RX ORDER — NICOTINE POLACRILEX 4 MG
15 LOZENGE BUCCAL PRN
Status: DISCONTINUED | OUTPATIENT
Start: 2022-05-06 | End: 2022-05-09 | Stop reason: HOSPADM

## 2022-05-06 RX ORDER — SODIUM CHLORIDE 0.9 % (FLUSH) 0.9 %
10 SYRINGE (ML) INJECTION EVERY 12 HOURS SCHEDULED
Status: DISCONTINUED | OUTPATIENT
Start: 2022-05-07 | End: 2022-05-09 | Stop reason: HOSPADM

## 2022-05-06 RX ORDER — ACETAMINOPHEN 325 MG/1
650 TABLET ORAL EVERY 6 HOURS PRN
Status: DISCONTINUED | OUTPATIENT
Start: 2022-05-06 | End: 2022-05-09 | Stop reason: HOSPADM

## 2022-05-06 RX ORDER — SERTRALINE HYDROCHLORIDE 25 MG/1
25 TABLET, FILM COATED ORAL DAILY
Status: DISCONTINUED | OUTPATIENT
Start: 2022-05-07 | End: 2022-05-09 | Stop reason: HOSPADM

## 2022-05-06 RX ORDER — SODIUM CHLORIDE 9 MG/ML
INJECTION, SOLUTION INTRAVENOUS PRN
Status: DISCONTINUED | OUTPATIENT
Start: 2022-05-06 | End: 2022-05-09 | Stop reason: HOSPADM

## 2022-05-06 RX ORDER — SODIUM CHLORIDE, SODIUM LACTATE, POTASSIUM CHLORIDE, CALCIUM CHLORIDE 600; 310; 30; 20 MG/100ML; MG/100ML; MG/100ML; MG/100ML
INJECTION, SOLUTION INTRAVENOUS CONTINUOUS
Status: DISPENSED | OUTPATIENT
Start: 2022-05-07 | End: 2022-05-07

## 2022-05-06 RX ORDER — INSULIN LISPRO 100 [IU]/ML
0-12 INJECTION, SOLUTION INTRAVENOUS; SUBCUTANEOUS EVERY 4 HOURS
Status: DISCONTINUED | OUTPATIENT
Start: 2022-05-07 | End: 2022-05-07

## 2022-05-06 RX ORDER — SODIUM CHLORIDE 0.9 % (FLUSH) 0.9 %
10 SYRINGE (ML) INJECTION PRN
Status: DISCONTINUED | OUTPATIENT
Start: 2022-05-06 | End: 2022-05-09 | Stop reason: HOSPADM

## 2022-05-06 RX ORDER — ACETAMINOPHEN 650 MG/1
650 SUPPOSITORY RECTAL EVERY 6 HOURS PRN
Status: DISCONTINUED | OUTPATIENT
Start: 2022-05-06 | End: 2022-05-09 | Stop reason: HOSPADM

## 2022-05-06 RX ORDER — SODIUM CHLORIDE 0.9 % (FLUSH) 0.9 %
3 SYRINGE (ML) INJECTION EVERY 8 HOURS
Status: DISCONTINUED | OUTPATIENT
Start: 2022-05-06 | End: 2022-05-07

## 2022-05-06 RX ORDER — PROMETHAZINE HYDROCHLORIDE 12.5 MG/1
12.5 TABLET ORAL EVERY 6 HOURS PRN
Status: DISCONTINUED | OUTPATIENT
Start: 2022-05-06 | End: 2022-05-09 | Stop reason: HOSPADM

## 2022-05-06 RX ORDER — ONDANSETRON 2 MG/ML
4 INJECTION INTRAMUSCULAR; INTRAVENOUS EVERY 6 HOURS PRN
Status: DISCONTINUED | OUTPATIENT
Start: 2022-05-06 | End: 2022-05-09 | Stop reason: HOSPADM

## 2022-05-06 RX ORDER — CARVEDILOL 12.5 MG/1
12.5 TABLET ORAL 2 TIMES DAILY WITH MEALS
Status: DISCONTINUED | OUTPATIENT
Start: 2022-05-07 | End: 2022-05-09 | Stop reason: HOSPADM

## 2022-05-06 RX ORDER — DEXTROSE MONOHYDRATE 50 MG/ML
100 INJECTION, SOLUTION INTRAVENOUS PRN
Status: DISCONTINUED | OUTPATIENT
Start: 2022-05-06 | End: 2022-05-09 | Stop reason: HOSPADM

## 2022-05-06 RX ORDER — DEXTROSE MONOHYDRATE 25 G/50ML
12.5 INJECTION, SOLUTION INTRAVENOUS PRN
Status: DISCONTINUED | OUTPATIENT
Start: 2022-05-06 | End: 2022-05-09 | Stop reason: HOSPADM

## 2022-05-06 RX ADMIN — SODIUM CHLORIDE 500 ML: 9 INJECTION, SOLUTION INTRAVENOUS at 22:01

## 2022-05-06 ASSESSMENT — ENCOUNTER SYMPTOMS
NAUSEA: 0
EYE DISCHARGE: 0
COUGH: 0
EYE REDNESS: 0
SORE THROAT: 0
BACK PAIN: 0
VOMITING: 0
SHORTNESS OF BREATH: 0
ABDOMINAL PAIN: 0

## 2022-05-06 ASSESSMENT — PAIN - FUNCTIONAL ASSESSMENT: PAIN_FUNCTIONAL_ASSESSMENT: NONE - DENIES PAIN

## 2022-05-06 NOTE — ED TRIAGE NOTES
Pt presents to ED with dizziness and SOB on exertion. Pt states she was told to come for further evaluation of labs and cardiac status.

## 2022-05-07 ENCOUNTER — ANESTHESIA (OUTPATIENT)
Dept: OPERATING ROOM | Age: 77
DRG: 813 | End: 2022-05-07
Payer: MEDICARE

## 2022-05-07 ENCOUNTER — ANESTHESIA EVENT (OUTPATIENT)
Dept: OPERATING ROOM | Age: 77
DRG: 813 | End: 2022-05-07
Payer: MEDICARE

## 2022-05-07 VITALS — OXYGEN SATURATION: 99 % | DIASTOLIC BLOOD PRESSURE: 52 MMHG | SYSTOLIC BLOOD PRESSURE: 92 MMHG

## 2022-05-07 PROBLEM — K92.2 GI BLEED: Status: ACTIVE | Noted: 2022-05-07

## 2022-05-07 LAB
ABO/RH: NORMAL
ALBUMIN SERPL-MCNC: 3.1 G/DL (ref 3.5–4.6)
ALP BLD-CCNC: 61 U/L (ref 40–130)
ALT SERPL-CCNC: 10 U/L (ref 0–33)
ANION GAP SERPL CALCULATED.3IONS-SCNC: 11 MEQ/L (ref 9–15)
ANTIBODY SCREEN: NORMAL
AST SERPL-CCNC: 11 U/L (ref 0–35)
BASOPHILS ABSOLUTE: 0 K/UL (ref 0–0.1)
BASOPHILS RELATIVE PERCENT: 0.4 % (ref 0.1–1.2)
BILIRUB SERPL-MCNC: 0.3 MG/DL (ref 0.2–0.7)
BUN BLDV-MCNC: 49 MG/DL (ref 8–23)
CALCIUM SERPL-MCNC: 8.9 MG/DL (ref 8.5–9.9)
CHLORIDE BLD-SCNC: 100 MEQ/L (ref 95–107)
CO2: 29 MEQ/L (ref 20–31)
CREAT SERPL-MCNC: 0.86 MG/DL (ref 0.5–0.9)
EOSINOPHILS ABSOLUTE: 0.2 K/UL (ref 0–0.4)
EOSINOPHILS RELATIVE PERCENT: 2.5 % (ref 0.7–5.8)
GFR AFRICAN AMERICAN: >60
GFR NON-AFRICAN AMERICAN: >60
GLOBULIN: 2.2 G/DL (ref 2.3–3.5)
GLUCOSE BLD-MCNC: 146 MG/DL (ref 70–99)
GLUCOSE BLD-MCNC: 154 MG/DL (ref 70–99)
GLUCOSE BLD-MCNC: 181 MG/DL (ref 70–99)
GLUCOSE BLD-MCNC: 197 MG/DL (ref 70–99)
GLUCOSE BLD-MCNC: 207 MG/DL (ref 70–99)
GLUCOSE BLD-MCNC: 243 MG/DL (ref 70–99)
HBA1C MFR BLD: 6.3 % (ref 4.8–5.9)
HCT VFR BLD CALC: 22.5 % (ref 37–47)
HEMOGLOBIN: 7.1 G/DL (ref 11.2–15.7)
IMMATURE GRANULOCYTES #: 0 K/UL
IMMATURE GRANULOCYTES %: 0.3 %
LYMPHOCYTES ABSOLUTE: 1.8 K/UL (ref 1.2–3.7)
LYMPHOCYTES RELATIVE PERCENT: 26.5 %
MAGNESIUM: 2 MG/DL (ref 1.7–2.4)
MCH RBC QN AUTO: 31.3 PG (ref 25.6–32.2)
MCHC RBC AUTO-ENTMCNC: 31.6 % (ref 32.2–35.5)
MCV RBC AUTO: 99.1 FL (ref 79.4–94.8)
MONOCYTES ABSOLUTE: 0.7 K/UL (ref 0.2–0.9)
MONOCYTES RELATIVE PERCENT: 9.9 % (ref 4.7–12.5)
NEUTROPHILS ABSOLUTE: 4 K/UL (ref 1.6–6.1)
NEUTROPHILS RELATIVE PERCENT: 60.4 % (ref 34–71.1)
PDW BLD-RTO: 15.4 % (ref 11.7–14.4)
PERFORMED ON: ABNORMAL
PLATELET # BLD: 180 K/UL (ref 182–369)
POTASSIUM REFLEX MAGNESIUM: 3.1 MEQ/L (ref 3.4–4.9)
RBC # BLD: 2.27 M/UL (ref 3.93–5.22)
SODIUM BLD-SCNC: 140 MEQ/L (ref 135–144)
TOTAL PROTEIN: 5.3 G/DL (ref 6.3–8)
WBC # BLD: 6.7 K/UL (ref 4–10)

## 2022-05-07 PROCEDURE — 2709999900 HC NON-CHARGEABLE SUPPLY: Performed by: SPECIALIST

## 2022-05-07 PROCEDURE — 36430 TRANSFUSION BLD/BLD COMPNT: CPT

## 2022-05-07 PROCEDURE — C9113 INJ PANTOPRAZOLE SODIUM, VIA: HCPCS | Performed by: INTERNAL MEDICINE

## 2022-05-07 PROCEDURE — 85025 COMPLETE CBC W/AUTO DIFF WBC: CPT

## 2022-05-07 PROCEDURE — 43235 EGD DIAGNOSTIC BRUSH WASH: CPT | Performed by: SPECIALIST

## 2022-05-07 PROCEDURE — 2580000003 HC RX 258: Performed by: SPECIALIST

## 2022-05-07 PROCEDURE — 82274 ASSAY TEST FOR BLOOD FECAL: CPT

## 2022-05-07 PROCEDURE — 80053 COMPREHEN METABOLIC PANEL: CPT

## 2022-05-07 PROCEDURE — 82607 VITAMIN B-12: CPT

## 2022-05-07 PROCEDURE — 1210000000 HC MED SURG R&B

## 2022-05-07 PROCEDURE — 83540 ASSAY OF IRON: CPT

## 2022-05-07 PROCEDURE — A4216 STERILE WATER/SALINE, 10 ML: HCPCS | Performed by: INTERNAL MEDICINE

## 2022-05-07 PROCEDURE — 82746 ASSAY OF FOLIC ACID SERUM: CPT

## 2022-05-07 PROCEDURE — 3700000001 HC ADD 15 MINUTES (ANESTHESIA): Performed by: SPECIALIST

## 2022-05-07 PROCEDURE — 6370000000 HC RX 637 (ALT 250 FOR IP): Performed by: SPECIALIST

## 2022-05-07 PROCEDURE — 2500000003 HC RX 250 WO HCPCS: Performed by: NURSE ANESTHETIST, CERTIFIED REGISTERED

## 2022-05-07 PROCEDURE — 2580000003 HC RX 258: Performed by: INTERNAL MEDICINE

## 2022-05-07 PROCEDURE — 36415 COLL VENOUS BLD VENIPUNCTURE: CPT

## 2022-05-07 PROCEDURE — 7100000011 HC PHASE II RECOVERY - ADDTL 15 MIN: Performed by: SPECIALIST

## 2022-05-07 PROCEDURE — 3609017100 HC EGD: Performed by: SPECIALIST

## 2022-05-07 PROCEDURE — 6370000000 HC RX 637 (ALT 250 FOR IP): Performed by: INTERNAL MEDICINE

## 2022-05-07 PROCEDURE — 0DJ08ZZ INSPECTION OF UPPER INTESTINAL TRACT, VIA NATURAL OR ARTIFICIAL OPENING ENDOSCOPIC: ICD-10-PCS | Performed by: SPECIALIST

## 2022-05-07 PROCEDURE — 7100000010 HC PHASE II RECOVERY - FIRST 15 MIN: Performed by: SPECIALIST

## 2022-05-07 PROCEDURE — 83036 HEMOGLOBIN GLYCOSYLATED A1C: CPT

## 2022-05-07 PROCEDURE — 83550 IRON BINDING TEST: CPT

## 2022-05-07 PROCEDURE — 6360000002 HC RX W HCPCS: Performed by: INTERNAL MEDICINE

## 2022-05-07 PROCEDURE — 82728 ASSAY OF FERRITIN: CPT

## 2022-05-07 PROCEDURE — 3700000000 HC ANESTHESIA ATTENDED CARE: Performed by: SPECIALIST

## 2022-05-07 PROCEDURE — 83735 ASSAY OF MAGNESIUM: CPT

## 2022-05-07 RX ORDER — INSULIN LISPRO 100 [IU]/ML
0-12 INJECTION, SOLUTION INTRAVENOUS; SUBCUTANEOUS
Status: DISCONTINUED | OUTPATIENT
Start: 2022-05-08 | End: 2022-05-09 | Stop reason: HOSPADM

## 2022-05-07 RX ORDER — SIMETHICONE 20 MG/.3ML
EMULSION ORAL PRN
Status: DISCONTINUED | OUTPATIENT
Start: 2022-05-07 | End: 2022-05-07 | Stop reason: ALTCHOICE

## 2022-05-07 RX ORDER — INSULIN LISPRO 100 [IU]/ML
0-6 INJECTION, SOLUTION INTRAVENOUS; SUBCUTANEOUS NIGHTLY
Status: DISCONTINUED | OUTPATIENT
Start: 2022-05-07 | End: 2022-05-09 | Stop reason: HOSPADM

## 2022-05-07 RX ORDER — IVABRADINE 7.5 MG/1
7.5 TABLET, FILM COATED ORAL DAILY
COMMUNITY
Start: 2022-03-22

## 2022-05-07 RX ORDER — ETOMIDATE 2 MG/ML
INJECTION INTRAVENOUS PRN
Status: DISCONTINUED | OUTPATIENT
Start: 2022-05-07 | End: 2022-05-07 | Stop reason: SDUPTHER

## 2022-05-07 RX ORDER — MAGNESIUM HYDROXIDE 1200 MG/15ML
LIQUID ORAL PRN
Status: DISCONTINUED | OUTPATIENT
Start: 2022-05-07 | End: 2022-05-07 | Stop reason: ALTCHOICE

## 2022-05-07 RX ORDER — LIDOCAINE HYDROCHLORIDE 20 MG/ML
INJECTION, SOLUTION EPIDURAL; INFILTRATION; INTRACAUDAL; PERINEURAL PRN
Status: DISCONTINUED | OUTPATIENT
Start: 2022-05-07 | End: 2022-05-07 | Stop reason: SDUPTHER

## 2022-05-07 RX ORDER — POTASSIUM CHLORIDE 750 MG/1
20 TABLET, EXTENDED RELEASE ORAL ONCE
Status: COMPLETED | OUTPATIENT
Start: 2022-05-07 | End: 2022-05-07

## 2022-05-07 RX ORDER — SODIUM CHLORIDE 9 MG/ML
INJECTION, SOLUTION INTRAVENOUS PRN
Status: DISCONTINUED | OUTPATIENT
Start: 2022-05-07 | End: 2022-05-09 | Stop reason: HOSPADM

## 2022-05-07 RX ADMIN — POTASSIUM CHLORIDE 20 MEQ: 10 TABLET, EXTENDED RELEASE ORAL at 12:37

## 2022-05-07 RX ADMIN — INSULIN LISPRO 1 UNITS: 100 INJECTION, SOLUTION INTRAVENOUS; SUBCUTANEOUS at 20:59

## 2022-05-07 RX ADMIN — INSULIN LISPRO 2 UNITS: 100 INJECTION, SOLUTION INTRAVENOUS; SUBCUTANEOUS at 12:28

## 2022-05-07 RX ADMIN — CARVEDILOL 12.5 MG: 12.5 TABLET, FILM COATED ORAL at 08:08

## 2022-05-07 RX ADMIN — PANTOPRAZOLE SODIUM 40 MG: 40 INJECTION, POWDER, FOR SOLUTION INTRAVENOUS at 12:25

## 2022-05-07 RX ADMIN — SODIUM CHLORIDE, POTASSIUM CHLORIDE, SODIUM LACTATE AND CALCIUM CHLORIDE: 600; 310; 30; 20 INJECTION, SOLUTION INTRAVENOUS at 02:17

## 2022-05-07 RX ADMIN — SODIUM CHLORIDE 500 ML: 9 INJECTION, SOLUTION INTRAVENOUS at 00:18

## 2022-05-07 RX ADMIN — INSULIN LISPRO 4 UNITS: 100 INJECTION, SOLUTION INTRAVENOUS; SUBCUTANEOUS at 00:18

## 2022-05-07 RX ADMIN — INSULIN LISPRO 4 UNITS: 100 INJECTION, SOLUTION INTRAVENOUS; SUBCUTANEOUS at 18:08

## 2022-05-07 RX ADMIN — LIDOCAINE HYDROCHLORIDE 60 MG: 20 INJECTION, SOLUTION EPIDURAL; INFILTRATION; INTRACAUDAL; PERINEURAL at 11:05

## 2022-05-07 RX ADMIN — INSULIN LISPRO 2 UNITS: 100 INJECTION, SOLUTION INTRAVENOUS; SUBCUTANEOUS at 05:29

## 2022-05-07 RX ADMIN — ETOMIDATE 8 MG: 2 INJECTION, SOLUTION INTRAVENOUS at 11:05

## 2022-05-07 RX ADMIN — SERTRALINE HYDROCHLORIDE 25 MG: 25 TABLET ORAL at 08:07

## 2022-05-07 RX ADMIN — INSULIN GLARGINE 15 UNITS: 100 INJECTION, SOLUTION SUBCUTANEOUS at 20:57

## 2022-05-07 RX ADMIN — SODIUM CHLORIDE, POTASSIUM CHLORIDE, SODIUM LACTATE AND CALCIUM CHLORIDE: 600; 310; 30; 20 INJECTION, SOLUTION INTRAVENOUS at 12:25

## 2022-05-07 RX ADMIN — PANTOPRAZOLE SODIUM 40 MG: 40 INJECTION, POWDER, FOR SOLUTION INTRAVENOUS at 00:19

## 2022-05-07 ASSESSMENT — PULMONARY FUNCTION TESTS
PIF_VALUE: 1
PIF_VALUE: 1
PIF_VALUE: 2
PIF_VALUE: 1
PIF_VALUE: 1
PIF_VALUE: 2
PIF_VALUE: 1
PIF_VALUE: 2
PIF_VALUE: 1

## 2022-05-07 NOTE — ED PROVIDER NOTES
[id number] [facility]  EMERGENCY DEPARTMENT ENCOUNTER      Pt Name: Hellen Almanza  MRN: [id number]  [place] 1945  Date of evaluation: 5/6/2022  Provider: Kelsie Bowen DO        HISTORY OF PRESENT ILLNESS    Hellen Almanza is a 69 y.o. female per chart review has ah/o cancer, CHF, hyperlipdemia, HTN, OA. She presents with dizziness from the urgent care. The history is provided by the patient. Dizziness  Quality:  Lightheadedness  Severity:  Moderate  Onset quality:  Sudden  Timing:  Constant  Progression:  Unchanged  Chronicity:  New  Context: bending over, head movement and standing up    Relieved by:  Being still  Worsened by: Movement  Ineffective treatments:  None tried  Associated symptoms: weakness    Associated symptoms: no chest pain, no nausea, no palpitations, no shortness of breath and no vomiting    Risk factors: heart disease             REVIEW OF SYSTEMS       Review of Systems   Constitutional: Negative for chills and fever. HENT: Negative for ear pain and sore throat. Eyes: Negative for discharge and redness. Respiratory: Negative for cough and shortness of breath. Cardiovascular: Negative for chest pain and palpitations. Gastrointestinal: Negative for abdominal pain, nausea and vomiting. Genitourinary: Negative for difficulty urinating and dysuria. Musculoskeletal: Negative for back pain and neck pain. Skin: Negative for rash and wound. Neurological: Positive for dizziness and weakness. Negative for syncope. Psychiatric/Behavioral: Negative for confusion. The patient is not nervous/anxious. All other systems reviewed and are negative. Except as noted above the remainder of the review of systems was reviewed and negative.        PAST MEDICAL HISTORY     Past Medical History:   Diagnosis Date    Cancer [facility])     bladder, breast,     CHF (congestive heart failure) (HCC)     Hyperlipidemia     Hypertension     Osteoarthritis     Type II or unspecified type diabetes mellitus without mention of complication, not stated as uncontrolled          SURGICAL HISTORY       Past Surgical History:   Procedure Laterality Date    BLADDER SURGERY      cancer    BREAST SURGERY      HERNIA REPAIR      HYSTERECTOMY           CURRENT MEDICATIONS       Current Discharge Medication List      CONTINUE these medications which have NOT CHANGED    Details   CORLANOR 7.5 MG TABS Take 7.5 mg by mouth daily      mirabegron (MYRBETRIQ) 50 MG TB24 Take 50 mg by mouth daily      atorvastatin (LIPITOR) 10 MG tablet Take 10 mg by mouth daily      metOLazone (ZAROXOLYN) 2.5 MG tablet Take 2.5 mg by mouth three times a week      spironolactone (ALDACTONE) 25 MG tablet Take 1 tablet by mouth daily  Qty: 30 tablet, Refills: 1      carvedilol (COREG) 12.5 MG tablet Take 1 tablet by mouth 2 times daily (with meals)  Qty: 60 tablet, Refills: 3      furosemide (LASIX) 40 MG tablet Take 1 tablet by mouth daily  Qty: 60 tablet, Refills: 3      losartan (COZAAR) 25 MG tablet Take 0.5 tablets by mouth 2 times daily  Qty: 30 tablet, Refills: 3      potassium chloride (KLOR-CON M) 10 MEQ extended release tablet Take 1 tablet by mouth daily  Qty: 60 tablet, Refills: 3      rivaroxaban (XARELTO) 15 MG TABS tablet Take 1 tablet by mouth daily (with breakfast)  Qty: 30 tablet, Refills: 0      acetaminophen (TYLENOL) 325 MG tablet Take 650 mg by mouth every 6 hours as needed      sertraline (ZOLOFT) 25 MG tablet Take 25 mg by mouth daily      ammonium lactate (LAC-HYDRIN) 12 % lotion Apply topically as needed      albuterol sulfate HFA (PROAIR HFA) 108 (90 Base) MCG/ACT inhaler USE 1 INHALATION EVERY 6 HOURS AS NEEDED FOR WHEEZING OR SHORTNESS OF BREATH AS DIRECTED      sulfaSALAzine (AZULFIDINE) 500 MG tablet Take 1,500 mg by mouth 2 times daily      aspirin (ECOTRIN LOW STRENGTH) 81 MG EC tablet Take 81 mg by mouth      insulin glargine (LANTUS SOLOSTAR) 100 UNIT/ML injection pen INJECT 22 UNITS AT BEDTIME insulin lispro (HUMALOG KWIKPEN) 100 UNIT/ML pen Inject 6 to 8 units before breakfast, 8 units with lunch and 12 units at dinner             ALLERGIES     Rocephin [ceftriaxone sodium], Clindamycin, Clindamycin/lincomycin, Minocycline, Tetracyclines & related, and Ceftriaxone    FAMILY HISTORY     History reviewed. No pertinent family history. SOCIAL HISTORY       Social History     Socioeconomic History    Marital status: Single     Spouse name: None    Number of children: None    Years of education: None    Highest education level: None   Occupational History    Occupation:    Tobacco Use    Smoking status: Former Smoker    Smokeless tobacco: Never Used    Tobacco comment: quit 40 years ago   Vaping Use    Vaping Use: Never used   Substance and Sexual Activity    Alcohol use: No    Drug use: No    Sexual activity: None   Other Topics Concern    None   Social History Narrative    None     Social Determinants of Health     Financial Resource Strain:     Difficulty of Paying Living Expenses: Not on file   Food Insecurity:     Worried About Running Out of Food in the Last Year: Not on file    David of Food in the Last Year: Not on file   Transportation Needs:     Lack of Transportation (Medical): Not on file    Lack of Transportation (Non-Medical):  Not on file   Physical Activity:     Days of Exercise per Week: Not on file    Minutes of Exercise per Session: Not on file   Stress:     Feeling of Stress : Not on file   Social Connections:     Frequency of Communication with Friends and Family: Not on file    Frequency of Social Gatherings with Friends and Family: Not on file    Attends Latter-day Services: Not on file    Active Member of Clubs or Organizations: Not on file    Attends Club or Organization Meetings: Not on file    Marital Status: Not on file   Intimate Partner Violence:     Fear of Current or Ex-Partner: Not on file    Emotionally Abused: Not on file   Petros Physically Abused: Not on file    Sexually Abused: Not on file   Housing Stability:     Unable to Pay for Housing in the Last Year: Not on file    Number of Places Lived in the Last Year: Not on file    Unstable Housing in the Last Year: Not on file         PHYSICAL EXAM       ED Triage Vitals [05/06/22 1939]   BP Temp Temp Source Pulse Resp SpO2 Height Weight   137/83 96.2 °F (35.7 °C) Oral 101 20 92 % 5' (1.524 m) 251 lb (113.9 kg)       Physical Exam  Vitals and nursing note reviewed. Exam conducted with a chaperone present. Constitutional:       Appearance: Normal appearance. HENT:      Head: Normocephalic and atraumatic. Right Ear: Tympanic membrane normal.      Left Ear: Tympanic membrane normal.      Nose: Nose normal.      Mouth/Throat:      Mouth: Mucous membranes are moist.      Pharynx: Oropharynx is clear. Eyes:      General: Lids are normal.      Extraocular Movements: Extraocular movements intact. Conjunctiva/sclera: Conjunctivae normal.      Pupils: Pupils are equal, round, and reactive to light. Cardiovascular:      Rate and Rhythm: Regular rhythm. Tachycardia present. Pulses: Normal pulses. Heart sounds: Normal heart sounds. Pulmonary:      Effort: Pulmonary effort is normal.      Breath sounds: Normal breath sounds. Abdominal:      General: Abdomen is flat. Bowel sounds are normal.      Palpations: Abdomen is soft. Genitourinary:     Rectum: Normal. Guaiac result negative. Musculoskeletal:         General: Normal range of motion. Cervical back: Full passive range of motion without pain, normal range of motion and neck supple. Skin:     General: Skin is warm. Capillary Refill: Capillary refill takes less than 2 seconds. Neurological:      General: No focal deficit present. Mental Status: She is alert and oriented to person, place, and time. Deep Tendon Reflexes: Reflexes are normal and symmetric.    Psychiatric:         Attention and Perception: Attention and perception normal.         Mood and Affect: Mood normal.         Behavior: Behavior normal. Behavior is cooperative.            LABS:  Labs Reviewed   COMPREHENSIVE METABOLIC PANEL - Abnormal; Notable for the following components:       Result Value    Glucose 224 (*)     BUN 53 (*)     CREATININE 1.16 (*)     GFR Non- 45.4 (*)     GFR  54.9 (*)     All other components within normal limits   CBC WITH AUTO DIFFERENTIAL - Abnormal; Notable for the following components:    RBC 2.71 (*)     Hemoglobin 8.3 (*)     Hematocrit 25.3 (*)     RDW 14.9 (*)     All other components within normal limits   COMPREHENSIVE METABOLIC PANEL W/ REFLEX TO MG FOR LOW K - Abnormal; Notable for the following components:    Potassium reflex Magnesium 3.1 (*)     Glucose 154 (*)     BUN 49 (*)     Total Protein 5.3 (*)     Albumin 3.1 (*)     Globulin 2.2 (*)     All other components within normal limits   CBC WITH AUTO DIFFERENTIAL - Abnormal; Notable for the following components:    RBC 2.27 (*)     Hemoglobin 7.1 (*)     Hematocrit 22.5 (*)     MCV 99.1 (*)     MCHC 31.6 (*)     RDW 15.4 (*)     Platelets 818 (*)     All other components within normal limits   POCT GLUCOSE - Abnormal; Notable for the following components:    POC Glucose 243 (*)     All other components within normal limits   POCT GLUCOSE - Abnormal; Notable for the following components:    POC Glucose 146 (*)     All other components within normal limits   LIPASE   AMYLASE   URINALYSIS WITH REFLEX TO CULTURE   BRAIN NATRIURETIC PEPTIDE   TROPONIN   MAGNESIUM   HEMOGLOBIN A1C   POCT GLUCOSE   POCT GLUCOSE   POCT GLUCOSE   POCT GLUCOSE   POCT GLUCOSE   TYPE AND SCREEN         MDM:   Vitals:    Vitals:    05/06/22 1939 05/06/22 2215 05/06/22 2350 05/07/22 0524   BP: 137/83 (!) 129/50 133/62 (!) 132/48   Pulse: 101  89 83   Resp: 20  18 18   Temp: 96.2 °F (35.7 °C)  97.9 °F (36.6 °C) 97.7 °F (36.5 °C)   TempSrc: Oral Oral Oral   SpO2: 92%  98% 97%   Weight: 251 lb (113.9 kg)  250 lb 12.8 oz (113.8 kg)    Height: 5' (1.524 m)  5' (1.524 m)        MDM  Number of Diagnoses or Management Options  Acute anemia  Acute renal failure, unspecified acute renal failure type ([address]  Dizziness  Diagnosis management comments: Patient presents with dizziness. She was seen at an urgent care and sent over for evaluation. Labs, EKG, CXR, CT brain ordered. CT brain: negative  CXR: no acute changes  Her Hb is 8.3. Her stool guaiac is negative. I called the hospitalist Dr. Elise Garcia. He would like me to speak with Dr. Sg Jackson to see if he can see her tomorrow. I spoke with Dr. Bety Clark. He agreed to consult Dr. Sg Jackson to see the patient tomorrow. Amount and/or Complexity of Data Reviewed  Clinical lab tests: ordered and reviewed  Tests in the radiology section of CPT®: ordered and reviewed         XR CHEST (2 VW)    (Results Pending)   CT Head WO Contrast    (Results Pending)         EKG Interpretation    Interpreted by emergency department physician    Rhythm: normal sinus   Rate: normal  Axis: left  Ectopy: none  Conduction: 1st degree AV block  ST Segments: nonspecific changes  T Waves: non specific changes  Q Waves: none    Clinical Impression: non-specific EKG    ERIKA CONLEY DO     The lab results, radiology and test results were reviewed with the patient and family. The patient will follow up in 2 days with their primary care doctor. If their symptoms change or get worse they will return to the ER. CRITICAL CARE TIME   Total CriticalCare time was 0 minutes, excluding separately reportable procedures. There was a high probability of clinically significant/life threatening deterioration in the patient's condition which required my urgent intervention. PROCEDURES:  Unlessotherwise noted below, none     Procedures      FINAL IMPRESSION      1. Acute renal failure, unspecified acute renal failure type ([address]    2.  Dizziness 3. Acute anemia          DISPOSITION/PLAN   DISPOSITION Admitted 05/06/2022 10:19:19 PM          ERIKA Worthy DO (electronically signed)  Attending Emergency Physician          Kyler Srivastava DO  05/07/22 [id number]

## 2022-05-07 NOTE — H&P
Hospital Medicine  History and Physical    Patient:  Yeimy Ernandez  MRN: [id number]    CHIEF COMPLAINT:    Chief Complaint   Patient presents with    Dizziness       History Obtained From:  Patient, EMR  Primary Care Physician: Ginger Hernandez MD    HISTORY OF PRESENT ILLNESS:   The patient is a 69 y.o. female who came in with worsening dizziness and lightheadedness mostly when she stands up. She was found to have a hemoglobin of 8. Baseline hemoglobin is around 11. Patient reported witnessing near black color stool couple of days ago. No hematemesis. No abdominal pain. No fever or chills. No previous GI bleed. Patient is on Xarelto for a blood clot DVT that was diagnosed almost a year and a half ago. December 2020. Past Medical History:      Diagnosis Date    Cancer [facility])     bladder, breast,     CHF (congestive heart failure) (HCC)     Hyperlipidemia     Hypertension     Osteoarthritis     Type II or unspecified type diabetes mellitus without mention of complication, not stated as uncontrolled        Past Surgical History:      Procedure Laterality Date    BLADDER SURGERY      cancer    BREAST SURGERY      HERNIA REPAIR      HYSTERECTOMY         Medications Prior to Admission:    Prior to Admission medications    Medication Sig Start Date End Date Taking?  Authorizing Provider   CORLANOR 7.5 MG TABS Take 7.5 mg by mouth daily 3/22/22  Yes Historical Provider, MD   mirabegron (MYRBETRIQ) 50 MG TB24 Take 50 mg by mouth daily 3/17/22 6/15/22 Yes Historical Provider, MD   atorvastatin (LIPITOR) 10 MG tablet Take 10 mg by mouth daily    Historical Provider, MD   metOLazone (ZAROXOLYN) 2.5 MG tablet Take 2.5 mg by mouth three times a week    Historical Provider, MD   spironolactone (ALDACTONE) 25 MG tablet Take 1 tablet by mouth daily 4/16/21   Chauncy Holter, MD   carvedilol (COREG) 12.5 MG tablet Take 1 tablet by mouth 2 times daily (with meals) 3/22/21   Kenney Perez MD   furosemide (LASIX) 40 MG tablet Take 1 tablet by mouth daily 3/22/21   Pierre Chavez MD   losartan (COZAAR) 25 MG tablet Take 0.5 tablets by mouth 2 times daily 3/22/21   Pierre Chavez MD   potassium chloride (KLOR-CON M) 10 MEQ extended release tablet Take 1 tablet by mouth daily 3/23/21   Pierre Chavez MD   rivaroxaban Jennifer Annmarie) 15 MG TABS tablet Take 1 tablet by mouth daily (with breakfast) 3/23/21   Pierre Chavez MD   acetaminophen (TYLENOL) 325 MG tablet Take 650 mg by mouth every 6 hours as needed 8/13/20   Historical Provider, MD   sertraline (ZOLOFT) 25 MG tablet Take 25 mg by mouth daily 12/28/20   Historical Provider, MD   ammonium lactate (LAC-HYDRIN) 12 % lotion Apply topically as needed 8/8/16   Historical Provider, MD   albuterol sulfate HFA (PROAIR HFA) 108 (90 Base) MCG/ACT inhaler USE 1 INHALATION EVERY 6 HOURS AS NEEDED FOR WHEEZING OR SHORTNESS OF BREATH AS DIRECTED 10/19/15   Historical Provider, MD   sulfaSALAzine (AZULFIDINE) 500 MG tablet Take 1,500 mg by mouth 2 times daily    Historical Provider, MD   aspirin (ECOTRIN LOW STRENGTH) 81 MG EC tablet Take 81 mg by mouth 3/16/11   Historical Provider, MD   insulin glargine (LANTUS SOLOSTAR) 100 UNIT/ML injection pen INJECT 22 UNITS AT BEDTIME 1/9/17   Historical Provider, MD   insulin lispro (HUMALOG KWIKPEN) 100 UNIT/ML pen Inject 6 to 8 units before breakfast, 8 units with lunch and 12 units at dinner 3/5/16   Historical Provider, MD       Allergies:  Rocephin [ceftriaxone sodium], Clindamycin, Clindamycin/lincomycin, Minocycline, Tetracyclines & related, and Ceftriaxone    Social History:   TOBACCO:   reports that she has quit smoking. She has never used smokeless tobacco.  ETOH:   reports no history of alcohol use. Family History:   History reviewed. No pertinent family history.     REVIEW OF SYSTEMS:  Ten systems reviewed and negative except for stated in HPI    Physical Exam:    Vitals: BP (!) 132/48   Pulse 83   Temp 97.7 °F (36.5 °C) (Oral)   Resp 18   Ht 5' (1.524 m)   Wt 250 lb 12.8 oz (113.8 kg)   SpO2 97%   BMI 48.98 kg/m²   General appearance: alert, appears stated age and cooperative, no apparent distress, morbidly obese. Skin: Pale skin color, texture, turgor normal. No rashes or lesions  HEENT: Head: Normocephalic, no lesions, without obvious abnormality. Neck: no adenopathy, no carotid bruit, no JVD, supple, symmetrical, trachea midline and thyroid not enlarged, symmetric, no tenderness/mass/nodules  Lungs: clear to auscultation bilaterally  Heart: regular rate and rhythm, S1, S2 normal, no murmur, click, rub or gallop  Abdomen: soft, non-tender; bowel sounds normal; no masses,  no organomegaly increased abdominal girth therefore clinically I could not exclude the possibility of intra-abdominal mass or organomegaly. Extremities: extremities normal, atraumatic, no cyanosis or edema chronic hyperpigmentation. No swelling or tenderness. Neurologic: Mental status: Alert, oriented, thought content appropriate     Recent Labs     05/06/22 2003 05/07/22  0543   WBC 8.5 6.7   HGB 8.3* 7.1*    180*     Recent Labs     05/06/22 2003 05/07/22  0543    140   K 3.4 3.1*   CL 95 100   CO2 28 29   BUN 53* 49*   CREATININE 1.16* 0.86   GLUCOSE 224* 154*   AST 11 11   ALT 11 10   BILITOT <0.2 0.3   ALKPHOS 76 61     Troponin T:   Recent Labs     05/06/22  2238   TROPONINI <0.010       ABGs: No results found for: PHART, PO2ART, TPN8ZTX  INR: No results for input(s): INR in the last 72 hours.   URINALYSIS:  Recent Labs     05/06/22 2028   COLORU Yellow   PHUR 6.5   CLARITYU Clear   SPECGRAV 1.015   LEUKOCYTESUR Negative   UROBILINOGEN 0.2   BILIRUBINUR Negative   BLOODU Negative   GLUCOSEU Negative     -----------------------------------------------------------------   XR CHEST (2 VW)    Result Date: 5/7/2022  EXAMINATION:  CHEST RADIOGRAPH (2 VIEW AP/LATERAL) Exam Date/Time:  5/6/2022 8:17 PM Clinical History:   dizzy Comparison:  3/20/2021  FINDINGS: Lines, tubes, and devices:  None. Cardiomediastinal silhouette:  Stable heart size within normal limits. Lungs and pleura:  No focal consolidation, pleural effusion or pneumothorax. Other: Postoperative changes left axilla. No acute cardiopulmonary process. CT Head WO Contrast    Result Date: 5/7/2022  EXAMINATION:  CT HEAD WO CONTRAST HISTORY:   dizzy  - TECHNIQUE: CT head without contrast. All CT scans at this facility use dose modulation, iterative reconstruction, and/or weight based dosing when appropriate to reduce radiation dose to as low as reasonably achievable. COMPARISON:  None. RESULT: Post-operative change:  None. Acute change:   No evidence of an acute intracranial process. Hemorrhage:    No evidence of acute intracranial hemorrhage. Mass Lesion / Mass Effect:   No evidence of an intracranial mass or extraaxial fluid collection. No significant mass effect. Chronic change:   Scattered patchy foci of low attenuation are present within supratentorial white matter which is a nonspecific finding but likely represents mild microvascular ischemia. Atherosclerotic calcification of the carotid siphons and vertebrobasilar arteries. Parenchyma:  Mild generalized volume loss. Ventricles:   Ventricular enlargement concordant with the degree of parenchymal volume loss. Other:  Mild paranasal sinus mucosal thickening. Mastoid cells are clear. Status post right lens replacements. No acute intracranial abnormality. Sequela of chronic microvascular ischemia and involutional changes. Assessment and Plan     *GI bleed is suspected. Probable acute/subacute. *Acute/subacute/chronic blood loss anemia. *Dizziness, lightheadedness secondary to above. *Cardiomyopathy, ejection fraction 20%. Euvolemic at this time. *History of DVT in the legs dating back to December 2020.     *Patient reported history of a renal mass which is been followed and investigated by urologist Dr. Nicolas Zaragoza at [facility].    *Morbid obesity. *Hypertension. *Diabetes. *Other medical problems not listed above. Plan:  I had accepted to observe the patient. At this time patient is dropping her hemoglobin therefore I will change her status to inpatient. I would transfuse 1 unit of RBC. Continue additional RBC transfusion as needed for low hemoglobin and low BP. I started patient on PPI twice a day intravenously. I requested GI investigation. Patient will be taken off Xarelto. Last DVT was in December 2020. Keep patient in a euvolemic state. Hold the diuretics and angiotensin receptor blocker due to risk of hypotension. Accu-Chek with a sliding scale coverage. Patient is to follow-up with PCP, cardiology and urology at the clinic postdischarge. I discussed with the patient my recommendation to transfuse. I discussed with the patient risk and the benefit. I answered all of her question. She is in agreement to proceed with the transfusion. 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    Skin growth D49.2    Venous stasis ulcer of right calf with fat layer exposed without varicose veins ([facility]) I87.2, V12.703    PAD (peripheral artery disease) ([facility]) I73.9    DVT, lower extremity, distal, acute, bilateral ([facility]) I82.4Z3    CHF (congestive heart failure), NYHA class I, acute on chronic, combined ([facility]) I50.43    Anemia D64.9       Magalene Lundborg, MD, MD  Admitting Hospitalist    TTS: 85mins where I focused more than 75% of my attention on rendering care, and planning treatment course for this patient, in addition to talking to RN team, mid levels, consulting with other physicians and following up on labs and imaging.     High Risk Readmission Screening Tool Score Noted.      Emergency Contact:

## 2022-05-07 NOTE — ANESTHESIA POSTPROCEDURE EVALUATION
Department of Anesthesiology  Postprocedure Note    Patient: Krista Cortez  MRN: [id number]  YOB: 1945  Date of evaluation: 5/7/2022  Time:  11:14 AM     Procedure Summary     Date: 05/07/22 Room / Location: [address] [place] [address]    Anesthesia Start: 1059 Anesthesia Stop: 1114    Procedure: EGD ESOPHAGOGASTRODUODENOSCOPY (N/A Esophagus) Diagnosis: (anemia)    Surgeons: Marylu Rodriguez MD Responsible Provider: KELI Grant CRNA    Anesthesia Type: MAC ASA Status: 4          Anesthesia Type: No value filed. Isac Phase I: Isac Score: 10    Isac Phase II:      Last vitals: Reviewed and per EMR flowsheets.        Anesthesia Post Evaluation    Patient location during evaluation: bedside  Patient participation: complete - patient participated  Level of consciousness: awake and awake and alert  Airway patency: patent  Nausea & Vomiting: no nausea and no vomiting  Complications: no  Cardiovascular status: blood pressure returned to baseline and hemodynamically stable  Respiratory status: acceptable  Hydration status: euvolemic

## 2022-05-07 NOTE — ANESTHESIA PRE PROCEDURE
Department of Anesthesiology  Preprocedure Note       Name:  Shaheen Rm   Age:  69 y.o.  :  1945                                          MRN:  [id number]         Date:  2022      Surgeon: Elo Israel):  Donnell Covarrubias MD    Procedure: Procedure(s):  EGD ESOPHAGOGASTRODUODENOSCOPY    Medications prior to admission:   Prior to Admission medications    Medication Sig Start Date End Date Taking?  Authorizing Provider   CORLANOR 7.5 MG TABS Take 7.5 mg by mouth daily 3/22/22  Yes Historical Provider, MD   mirabegron (MYRBETRIQ) 50 MG TB24 Take 50 mg by mouth daily 3/17/22 6/15/22 Yes Historical Provider, MD   atorvastatin (LIPITOR) 10 MG tablet Take 10 mg by mouth daily    Historical Provider, MD   metOLazone (ZAROXOLYN) 2.5 MG tablet Take 2.5 mg by mouth three times a week    Historical Provider, MD   spironolactone (ALDACTONE) 25 MG tablet Take 1 tablet by mouth daily 21   Vianey Patten MD   carvedilol (COREG) 12.5 MG tablet Take 1 tablet by mouth 2 times daily (with meals) 3/22/21   Zain Cheng MD   furosemide (LASIX) 40 MG tablet Take 1 tablet by mouth daily 3/22/21   Zain Cheng MD   losartan (COZAAR) 25 MG tablet Take 0.5 tablets by mouth 2 times daily 3/22/21   Zain Cheng MD   potassium chloride (KLOR-CON M) 10 MEQ extended release tablet Take 1 tablet by mouth daily 3/23/21   Zain Cheng MD   rivaroxaban Floreen Press) 15 MG TABS tablet Take 1 tablet by mouth daily (with breakfast) 3/23/21   Zain Cheng MD   acetaminophen (TYLENOL) 325 MG tablet Take 650 mg by mouth every 6 hours as needed 20   Historical Provider, MD   sertraline (ZOLOFT) 25 MG tablet Take 25 mg by mouth daily 20   Historical Provider, MD   ammonium lactate (LAC-HYDRIN) 12 % lotion Apply topically as needed 16   Historical Provider, MD   albuterol sulfate HFA (PROAIR HFA) 108 (90 Base) MCG/ACT inhaler USE 1 INHALATION EVERY 6 HOURS AS NEEDED FOR WHEEZING OR SHORTNESS OF BREATH AS DIRECTED 10/19/15   Historical Provider, MD   sulfaSALAzine (AZULFIDINE) 500 MG tablet Take 1,500 mg by mouth 2 times daily    Historical Provider, MD   aspirin (ECOTRIN LOW STRENGTH) 81 MG EC tablet Take 81 mg by mouth 3/16/11   Historical Provider, MD   insulin glargine (LANTUS SOLOSTAR) 100 UNIT/ML injection pen INJECT 22 UNITS AT BEDTIME 1/9/17   Historical Provider, MD   insulin lispro (HUMALOG KWIKPEN) 100 UNIT/ML pen Inject 6 to 8 units before breakfast, 8 units with lunch and 12 units at dinner 3/5/16   Historical Provider, MD       Current medications:    Current Facility-Administered Medications   Medication Dose Route Frequency Provider Last Rate Last Admin    0.9 % sodium chloride infusion   IntraVENous PRN No Hays MD        potassium chloride (KLOR-CON M) extended release tablet 20 mEq  20 mEq Oral Once No Hays MD        acetaminophen (TYLENOL) tablet 650 mg  650 mg Oral Q6H PRN Ema Clifford MD        carvedilol (COREG) tablet 12.5 mg  12.5 mg Oral BID WC Ema Clifford MD   12.5 mg at 05/07/22 0808    insulin glargine (LANTUS) injection vial 15 Units  15 Units SubCUTAneous Nightly Ema Clifford MD        sertraline (ZOLOFT) tablet 25 mg  25 mg Oral Daily Ema Clifford MD   25 mg at 05/07/22 0807    sodium chloride flush 0.9 % injection 10 mL  10 mL IntraVENous 2 times per day No Hays MD        sodium chloride flush 0.9 % injection 10 mL  10 mL IntraVENous PRN No Hays MD        0.9 % sodium chloride infusion   IntraVENous PRN No Hays MD        promethazine (PHENERGAN) tablet 12.5 mg  12.5 mg Oral Q6H PRN Ema Clifford MD        Or    ondansetron (ZOFRAN) injection 4 mg  4 mg IntraVENous Q6H PRN Ema Clifford MD        polyethylene glycol (GLYCOLAX) packet 17 g  17 g Oral Daily PRN No Hays MD        acetaminophen (TYLENOL) tablet 650 mg  650 mg Oral Q6H PRN Ema Clifford MD        Or    acetaminophen (TYLENOL) suppository 650 mg  650 mg Rectal Q6H PRN No Hays MD  pantoprazole (PROTONIX) 40 mg in sodium chloride (PF) 10 mL injection  40 mg IntraVENous Q12H Ema Clifford MD   40 mg at 05/07/22 0019    lactated ringers infusion   IntraVENous Continuous Ema Clifford  mL/hr at 05/07/22 0217 New Bag at 05/07/22 0217    glucose (GLUTOSE) 40 % oral gel 15 g  15 g Oral PRN Ema Clifford MD        dextrose 50 % IV solution  12.5 g IntraVENous PRN Ema Clifford MD        glucagon (rDNA) injection 1 mg  1 mg IntraMUSCular PRN Ema Clifford MD        dextrose 5 % solution  100 mL/hr IntraVENous PRN Yue Mcduffie MD        insulin lispro (HUMALOG) injection vial 0-12 Units  0-12 Units SubCUTAneous Q4H Yue Mcduffie MD   2 Units at 05/07/22 0529       Allergies:     Allergies   Allergen Reactions    Rocephin [Ceftriaxone Sodium]     Clindamycin Hives    Clindamycin/Lincomycin Rash    Minocycline Rash    Tetracyclines & Related Swelling     allergic to Minocin    Ceftriaxone Rash       Problem List:    Patient Active Problem List   Diagnosis Code    Venous insufficiency (chronic) (peripheral) I87.2    Venous ulcer of right leg ([facility]) I83.019, L97.919    Edema leg R60.0    Obesity due to excess calories E66.09    Lymphedema of both lower extremities I89.0    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    Skin growth D49.2    Venous stasis ulcer of right calf with fat layer exposed without varicose veins (HCC) I87.2, Y10.182    PAD (peripheral artery disease) (HCC) I73.9    DVT, lower extremity, distal, acute, bilateral ([facility]) I82.4Z3    CHF (congestive heart failure), NYHA class I, acute on chronic, combined ([facility]) I50.43    Anemia D64.9       Past Medical History:        Diagnosis Date    Cancer ([facility] [address]     bladder, breast,     CHF (congestive heart failure) ([facility])     Hyperlipidemia     Hypertension     Osteoarthritis     Type II or unspecified type diabetes mellitus without mention of complication, not stated as uncontrolled        Past Surgical History: Procedure Laterality Date    BLADDER SURGERY      cancer    BREAST SURGERY      HERNIA REPAIR      HYSTERECTOMY         Social History:    Social History     Tobacco Use    Smoking status: Former Smoker    Smokeless tobacco: Never Used    Tobacco comment: quit 40 years ago   Substance Use Topics    Alcohol use: No                                Counseling given: Not Answered  Comment: quit 40 years ago      Vital Signs (Current):   Vitals:    05/06/22 1939 05/06/22 2215 05/06/22 2350 05/07/22 0524   BP: 137/83 (!) 129/50 133/62 (!) 132/48   Pulse: 101  89 83   Resp: 20 18 18   Temp: 35.7 °C (96.2 °F)  36.6 °C (97.9 °F) 36.5 °C (97.7 °F)   TempSrc: Oral  Oral Oral   SpO2: 92%  98% 97%   Weight: 251 lb (113.9 kg)  250 lb 12.8 oz (113.8 kg)    Height: 5' (1.524 m)  5' (1.524 m)                                               BP Readings from Last 3 Encounters:   05/07/22 (!) 132/48   08/19/21 128/68   03/22/21 118/62       NPO Status:                                                                                 BMI:   Wt Readings from Last 3 Encounters:   05/06/22 250 lb 12.8 oz (113.8 kg)   08/18/21 248 lb (112.5 kg)   03/19/21 250 lb (113.4 kg)     Body mass index is 48.98 kg/m².     CBC:   Lab Results   Component Value Date    WBC 6.7 05/07/2022    RBC 2.27 05/07/2022    RBC 4.62 02/21/2012    HGB 7.1 05/07/2022    HCT 22.5 05/07/2022    MCV 99.1 05/07/2022    RDW 15.4 05/07/2022     05/07/2022       CMP:   Lab Results   Component Value Date     05/07/2022    K 3.1 05/07/2022     05/07/2022    CO2 29 05/07/2022    BUN 49 05/07/2022    CREATININE 0.86 05/07/2022    GFRAA >60.0 05/07/2022    LABGLOM >60.0 05/07/2022    GLUCOSE 154 05/07/2022    GLUCOSE 149 02/21/2012    PROT 5.3 05/07/2022    CALCIUM 8.9 05/07/2022    BILITOT 0.3 05/07/2022    ALKPHOS 61 05/07/2022    AST 11 05/07/2022    ALT 10 05/07/2022       POC Tests:   Recent Labs     05/07/22  0525   POCGLU 146*       Coags: Lab Results   Component Value Date    PROTIME 15.9 03/19/2021    INR 1.3 03/19/2021       HCG (If Applicable): No results found for: PREGTESTUR, PREGSERUM, HCG, HCGQUANT     ABGs: No results found for: PHART, PO2ART, FHN7LYZ, VTW5ASF, BEART, G9ZVYQUB     Type & Screen (If Applicable):  No results found for: LABABO, LABRH    Drug/Infectious Status (If Applicable):  No results found for: HIV, HEPCAB    COVID-19 Screening (If Applicable):   Lab Results   Component Value Date    COVID19 Not Detected 03/19/2021           Anesthesia Evaluation    Airway: Mallampati: III  TM distance: >3 FB   Neck ROM: full  Mouth opening: > = 3 FB Dental: normal exam         Pulmonary:normal exam                               Cardiovascular:    (+) hypertension:, CHF:,       ECG reviewed      Echocardiogram reviewed                  Neuro/Psych:               GI/Hepatic/Renal:   (+) GERD:, morbid obesity          Endo/Other:    (+) DiabetesType II DM, , .          Pt had no PAT visit       Abdominal:   (+) obese,           Vascular: Other Findings:             Anesthesia Plan      MAC     ASA 4       Induction: intravenous. Anesthetic plan and risks discussed with patient. Use of blood products discussed with patient whom consented to blood products.    Plan discussed with surgical team.                  Parviz Nicole, APRN - CRNA   5/7/2022

## 2022-05-07 NOTE — PROGRESS NOTES
Patient ID:  Ericka Evans  [id number]  89 y.o.  1945  Patient received in PACU BED 4 Report received from Anesthesia and Surgical Nurse. Attached to Monitor, VSS, See Nursing Assessment and Flowsheets for detailed Information  Awake, following commands, answering questions appropriately. O2 Sats>92 on Room air  Handoff Given to Charu 60   Pt transferred back to med surg via wheelchair.     Electronically signed by Tara Jaime RN on 5/7/22

## 2022-05-07 NOTE — PROGRESS NOTES
56 - Verified with [facility] monitor room of pt being placed on tele. Per tech, they were notified and can see pt.  0910 - Ferritin, Iron&Tibc, and Vitamin B12& Folate can be added on to todays labs. Sharron Gomez aware of type and screen orders and for pt to receive blood transfusion. 2705 - Dr. Zoie Brush educated and informed pt of need to have blood transfusion. Pt stated understanding. Pt signed written consent for blood transfusion. 1035 - Pt down to surgery to get EGD done by Dr. Jazzy Hutchins. Verbal order from Dr. Zoie Brush, ok not to go down with tele. 1202 -Pt returned returned from EGD.  1207 - Verbal order per Dr. Zoie Brush for diet to be changed to [address] - Stool sample obtained and sent to lab. Per lab patricia, blood sample was sent for pt to receive blood and blood not ready yet to be sent to [address] [place] [address] - Per Lab Patricia, blood not going to arrive to [facility] until 2030.

## 2022-05-07 NOTE — PROGRESS NOTES
Per Dr. Kerri Oneal, ER Physician, \"Her Hb is 8.3. Her stool guaiac is negative. I called the hospitalist Dr. Elma Harris. He would like me to speak with Dr. Brad Butler to see if he can see her tomorrow. I spoke with Dr. Liberty Zhao. He agreed to consult Dr. Brad Butler to see the patient tomorrow [5/7/2022]\".

## 2022-05-08 LAB
ANION GAP SERPL CALCULATED.3IONS-SCNC: 8 MEQ/L (ref 9–15)
BASOPHILS ABSOLUTE: 0 K/UL (ref 0–0.1)
BASOPHILS RELATIVE PERCENT: 0.3 % (ref 0.1–1.2)
BLOOD BANK DISPENSE STATUS: NORMAL
BLOOD BANK DISPENSE STATUS: NORMAL
BLOOD BANK PRODUCT CODE: NORMAL
BLOOD BANK PRODUCT CODE: NORMAL
BPU ID: NORMAL
BPU ID: NORMAL
BUN BLDV-MCNC: 36 MG/DL (ref 8–23)
CALCIUM SERPL-MCNC: 8.3 MG/DL (ref 8.5–9.9)
CHLORIDE BLD-SCNC: 105 MEQ/L (ref 95–107)
CO2: 29 MEQ/L (ref 20–31)
CREAT SERPL-MCNC: 0.72 MG/DL (ref 0.5–0.9)
DESCRIPTION BLOOD BANK: NORMAL
DESCRIPTION BLOOD BANK: NORMAL
EKG ATRIAL RATE: 85 BPM
EKG P AXIS: 98 DEGREES
EKG P-R INTERVAL: 216 MS
EKG Q-T INTERVAL: 438 MS
EKG QRS DURATION: 136 MS
EKG QTC CALCULATION (BAZETT): 521 MS
EKG R AXIS: -35 DEGREES
EKG T AXIS: 111 DEGREES
EKG VENTRICULAR RATE: 85 BPM
EOSINOPHILS ABSOLUTE: 0.2 K/UL (ref 0–0.4)
EOSINOPHILS RELATIVE PERCENT: 3.4 % (ref 0.7–5.8)
FERRITIN: 78 NG/ML (ref 13–150)
FOLATE: >20 NG/ML
GFR AFRICAN AMERICAN: >60
GFR NON-AFRICAN AMERICAN: >60
GLUCOSE BLD-MCNC: 157 MG/DL (ref 70–99)
GLUCOSE BLD-MCNC: 170 MG/DL (ref 70–99)
GLUCOSE BLD-MCNC: 190 MG/DL (ref 70–99)
GLUCOSE BLD-MCNC: 220 MG/DL (ref 70–99)
GLUCOSE BLD-MCNC: 221 MG/DL (ref 70–99)
HCT VFR BLD CALC: 22.5 % (ref 37–47)
HCT VFR BLD CALC: 25.7 % (ref 37–47)
HEMOCCULT STL QL: ABNORMAL
HEMOGLOBIN: 7.4 G/DL (ref 11.2–15.7)
HEMOGLOBIN: 8.6 G/DL (ref 11.2–15.7)
IMMATURE GRANULOCYTES #: 0.1 K/UL
IMMATURE GRANULOCYTES %: 0.8 %
IRON SATURATION: 12 % (ref 20–55)
IRON: 35 UG/DL (ref 37–145)
LYMPHOCYTES ABSOLUTE: 1.7 K/UL (ref 1.2–3.7)
LYMPHOCYTES RELATIVE PERCENT: 27.5 %
MCH RBC QN AUTO: 32 PG (ref 25.6–32.2)
MCHC RBC AUTO-ENTMCNC: 32.9 % (ref 32.2–35.5)
MCV RBC AUTO: 97.4 FL (ref 79.4–94.8)
MONOCYTES ABSOLUTE: 0.6 K/UL (ref 0.2–0.9)
MONOCYTES RELATIVE PERCENT: 9 % (ref 4.7–12.5)
NEUTROPHILS ABSOLUTE: 3.7 K/UL (ref 1.6–6.1)
NEUTROPHILS RELATIVE PERCENT: 59 % (ref 34–71.1)
PDW BLD-RTO: 15.6 % (ref 11.7–14.4)
PERFORMED ON: ABNORMAL
PLATELET # BLD: 170 K/UL (ref 182–369)
POTASSIUM SERPL-SCNC: 3.5 MEQ/L (ref 3.4–4.9)
RBC # BLD: 2.31 M/UL (ref 3.93–5.22)
SODIUM BLD-SCNC: 142 MEQ/L (ref 135–144)
TOTAL IRON BINDING CAPACITY: 300 UG/DL (ref 250–450)
UNSATURATED IRON BINDING CAPACITY: 265 UG/DL (ref 112–347)
VITAMIN B-12: 257 PG/ML (ref 232–1245)
WBC # BLD: 6.2 K/UL (ref 4–10)

## 2022-05-08 PROCEDURE — 85025 COMPLETE CBC W/AUTO DIFF WBC: CPT

## 2022-05-08 PROCEDURE — A4216 STERILE WATER/SALINE, 10 ML: HCPCS | Performed by: INTERNAL MEDICINE

## 2022-05-08 PROCEDURE — 2580000003 HC RX 258: Performed by: INTERNAL MEDICINE

## 2022-05-08 PROCEDURE — C9113 INJ PANTOPRAZOLE SODIUM, VIA: HCPCS | Performed by: INTERNAL MEDICINE

## 2022-05-08 PROCEDURE — 85018 HEMOGLOBIN: CPT

## 2022-05-08 PROCEDURE — 93010 ELECTROCARDIOGRAM REPORT: CPT | Performed by: INTERNAL MEDICINE

## 2022-05-08 PROCEDURE — 1210000000 HC MED SURG R&B

## 2022-05-08 PROCEDURE — 6370000000 HC RX 637 (ALT 250 FOR IP): Performed by: INTERNAL MEDICINE

## 2022-05-08 PROCEDURE — 85014 HEMATOCRIT: CPT

## 2022-05-08 PROCEDURE — 80048 BASIC METABOLIC PNL TOTAL CA: CPT

## 2022-05-08 PROCEDURE — 6360000002 HC RX W HCPCS: Performed by: INTERNAL MEDICINE

## 2022-05-08 PROCEDURE — 36430 TRANSFUSION BLD/BLD COMPNT: CPT

## 2022-05-08 RX ORDER — PANTOPRAZOLE SODIUM 40 MG/1
40 TABLET, DELAYED RELEASE ORAL
Status: DISCONTINUED | OUTPATIENT
Start: 2022-05-09 | End: 2022-05-09 | Stop reason: HOSPADM

## 2022-05-08 RX ORDER — SODIUM CHLORIDE 9 MG/ML
INJECTION, SOLUTION INTRAVENOUS PRN
Status: DISCONTINUED | OUTPATIENT
Start: 2022-05-08 | End: 2022-05-09 | Stop reason: HOSPADM

## 2022-05-08 RX ORDER — CYANOCOBALAMIN 1000 UG/ML
1000 INJECTION INTRAMUSCULAR; SUBCUTANEOUS DAILY
Status: COMPLETED | OUTPATIENT
Start: 2022-05-08 | End: 2022-05-09

## 2022-05-08 RX ADMIN — CARVEDILOL 12.5 MG: 12.5 TABLET, FILM COATED ORAL at 07:05

## 2022-05-08 RX ADMIN — CYANOCOBALAMIN 1000 MCG: 1000 INJECTION, SOLUTION INTRAMUSCULAR; SUBCUTANEOUS at 16:54

## 2022-05-08 RX ADMIN — INSULIN LISPRO 2 UNITS: 100 INJECTION, SOLUTION INTRAVENOUS; SUBCUTANEOUS at 16:43

## 2022-05-08 RX ADMIN — INSULIN GLARGINE 15 UNITS: 100 INJECTION, SOLUTION SUBCUTANEOUS at 20:41

## 2022-05-08 RX ADMIN — INSULIN LISPRO 2 UNITS: 100 INJECTION, SOLUTION INTRAVENOUS; SUBCUTANEOUS at 08:24

## 2022-05-08 RX ADMIN — INSULIN LISPRO 2 UNITS: 100 INJECTION, SOLUTION INTRAVENOUS; SUBCUTANEOUS at 20:42

## 2022-05-08 RX ADMIN — CARVEDILOL 12.5 MG: 12.5 TABLET, FILM COATED ORAL at 16:43

## 2022-05-08 RX ADMIN — INSULIN LISPRO 4 UNITS: 100 INJECTION, SOLUTION INTRAVENOUS; SUBCUTANEOUS at 13:10

## 2022-05-08 RX ADMIN — IRON SUCROSE 200 MG: 20 INJECTION, SOLUTION INTRAVENOUS at 07:42

## 2022-05-08 RX ADMIN — PANTOPRAZOLE SODIUM 40 MG: 40 INJECTION, POWDER, FOR SOLUTION INTRAVENOUS at 01:32

## 2022-05-08 RX ADMIN — SODIUM CHLORIDE, PRESERVATIVE FREE 10 ML: 5 INJECTION INTRAVENOUS at 21:42

## 2022-05-08 RX ADMIN — SODIUM CHLORIDE, PRESERVATIVE FREE 10 ML: 5 INJECTION INTRAVENOUS at 07:46

## 2022-05-08 RX ADMIN — SERTRALINE HYDROCHLORIDE 25 MG: 25 TABLET ORAL at 07:42

## 2022-05-08 NOTE — PROGRESS NOTES
Following up on a patient. Patient is doing fairly well. No hematemesis or melena. No abdominal pain. No fever or chills. ROS: 12 system review otherwise is negative for acute signs or symptoms over the last 24 hrs. General appearance: alert, appears stated age and cooperative, no apparent distress, morbidly obese. Skin: Pale skin color, texture, turgor normal. No rashes or lesions  HEENT: Head: Normocephalic, no lesions, without obvious abnormality. Neck: no adenopathy, no carotid bruit, no JVD, supple, symmetrical, trachea midline and thyroid not enlarged, symmetric, no tenderness/mass/nodules  Lungs: clear to auscultation bilaterally  Heart: regular rate and rhythm, S1, S2 normal, no murmur, click, rub or gallop  Abdomen: soft, non-tender; bowel sounds normal; no masses,  no organomegaly increased abdominal girth therefore clinically I could not exclude the possibility of intra-abdominal mass or organomegaly. Extremities: extremities normal, atraumatic, no cyanosis or edema chronic hyperpigmentation. No swelling or tenderness. Neurologic: Mental status: Alert, oriented, thought content appropriate     Assessment and plan:     *Recent GI bleed. EGD did not show any active bleed. Continue PPI. Plan for colonoscopy in the next week or 2     *Acute/subacute/chronic blood loss anemia. Patient asymptomatic. Patient is hypotensive. Additional 1 unit of RBC transfusion. Total 2 thus far. *Severe iron deficiency anemia. Most likely secondary to above. I started the patient on iron infusion.     *Dizziness, lightheadedness secondary to above. Secondary to anemia.     *Cardiomyopathy, ejection fraction 20%. Euvolemic at this time.     *History of DVT in the legs dating back to December 2020. I discussed with the patient risk and the benefit of anticoagulation.   Given her severe anemia and iron deficiency and the fact that she had the DVT more than a year and a half ago patient does not want to take her Xarelto at this time.     *Patient reported history of a renal mass which is been followed and investigated by urologist Dr. Kobe Feliz at [facility].     *Morbid obesity.     *Hypertension. Borderline hypotension secondary to anemia. Additional RBC transfusion     *Diabetes. Fair control, continue to monitor.     *Other medical problems not listed above. I may or may not have addressed all of this pt symptoms, medical issues, abnormal labs and findings. Pt will need additional work up, investigation, testing, surveillance, and treatment to be done at a later time and date during this hospitalization or post discharge by PCP and other out pt providers.

## 2022-05-08 NOTE — PROGRESS NOTES
Unit of blood verified with this RN and RN supervisor Bipin Garcia. This nurse assessed pt for 15 min after blood reached pt's vein. Pt denies symptoms of reaction. Rate changed verified by RN Supervisor. Vitals per flowsheet.

## 2022-05-09 VITALS
OXYGEN SATURATION: 95 % | SYSTOLIC BLOOD PRESSURE: 127 MMHG | HEIGHT: 60 IN | BODY MASS INDEX: 49.24 KG/M2 | RESPIRATION RATE: 20 BRPM | DIASTOLIC BLOOD PRESSURE: 59 MMHG | HEART RATE: 100 BPM | WEIGHT: 250.8 LBS | TEMPERATURE: 98 F

## 2022-05-09 LAB
BASOPHILS ABSOLUTE: 0.1 K/UL (ref 0–0.1)
BASOPHILS RELATIVE PERCENT: 0.7 % (ref 0.1–1.2)
EOSINOPHILS ABSOLUTE: 0.3 K/UL (ref 0–0.4)
EOSINOPHILS RELATIVE PERCENT: 3.9 % (ref 0.7–5.8)
GLUCOSE BLD-MCNC: 193 MG/DL (ref 70–99)
GLUCOSE BLD-MCNC: 243 MG/DL (ref 70–99)
GLUCOSE BLD-MCNC: 258 MG/DL (ref 70–99)
HCT VFR BLD CALC: 25.8 % (ref 37–47)
HEMOGLOBIN: 8.4 G/DL (ref 11.2–15.7)
IMMATURE GRANULOCYTES #: 0.1 K/UL
IMMATURE GRANULOCYTES %: 1.9 %
LYMPHOCYTES ABSOLUTE: 1.9 K/UL (ref 1.2–3.7)
LYMPHOCYTES RELATIVE PERCENT: 28 %
MCH RBC QN AUTO: 31.6 PG (ref 25.6–32.2)
MCHC RBC AUTO-ENTMCNC: 32.6 % (ref 32.2–35.5)
MCV RBC AUTO: 97 FL (ref 79.4–94.8)
MONOCYTES ABSOLUTE: 0.7 K/UL (ref 0.2–0.9)
MONOCYTES RELATIVE PERCENT: 10.4 % (ref 4.7–12.5)
NEUTROPHILS ABSOLUTE: 3.8 K/UL (ref 1.6–6.1)
NEUTROPHILS RELATIVE PERCENT: 55.1 % (ref 34–71.1)
PDW BLD-RTO: 16.2 % (ref 11.7–14.4)
PERFORMED ON: ABNORMAL
PLATELET # BLD: 185 K/UL (ref 182–369)
RBC # BLD: 2.66 M/UL (ref 3.93–5.22)
WBC # BLD: 6.9 K/UL (ref 4–10)

## 2022-05-09 PROCEDURE — 36415 COLL VENOUS BLD VENIPUNCTURE: CPT

## 2022-05-09 PROCEDURE — 85025 COMPLETE CBC W/AUTO DIFF WBC: CPT

## 2022-05-09 PROCEDURE — 6370000000 HC RX 637 (ALT 250 FOR IP): Performed by: INTERNAL MEDICINE

## 2022-05-09 PROCEDURE — 2580000003 HC RX 258: Performed by: INTERNAL MEDICINE

## 2022-05-09 PROCEDURE — 6360000002 HC RX W HCPCS: Performed by: INTERNAL MEDICINE

## 2022-05-09 PROCEDURE — 97530 THERAPEUTIC ACTIVITIES: CPT

## 2022-05-09 PROCEDURE — 97110 THERAPEUTIC EXERCISES: CPT

## 2022-05-09 RX ORDER — FERROUS SULFATE 325(65) MG
325 TABLET ORAL 2 TIMES DAILY
Qty: 180 TABLET | Refills: 1 | Status: SHIPPED | OUTPATIENT
Start: 2022-05-09

## 2022-05-09 RX ORDER — PANTOPRAZOLE SODIUM 40 MG/1
40 TABLET, DELAYED RELEASE ORAL
Qty: 30 TABLET | Refills: 3 | Status: SHIPPED | OUTPATIENT
Start: 2022-05-10

## 2022-05-09 RX ADMIN — PANTOPRAZOLE SODIUM 40 MG: 40 TABLET, DELAYED RELEASE ORAL at 06:29

## 2022-05-09 RX ADMIN — CYANOCOBALAMIN 1000 MCG: 1000 INJECTION, SOLUTION INTRAMUSCULAR; SUBCUTANEOUS at 09:40

## 2022-05-09 RX ADMIN — INSULIN LISPRO 2 UNITS: 100 INJECTION, SOLUTION INTRAVENOUS; SUBCUTANEOUS at 07:55

## 2022-05-09 RX ADMIN — SERTRALINE HYDROCHLORIDE 25 MG: 25 TABLET ORAL at 09:40

## 2022-05-09 RX ADMIN — INSULIN LISPRO 6 UNITS: 100 INJECTION, SOLUTION INTRAVENOUS; SUBCUTANEOUS at 16:54

## 2022-05-09 RX ADMIN — CARVEDILOL 12.5 MG: 12.5 TABLET, FILM COATED ORAL at 17:00

## 2022-05-09 RX ADMIN — IRON SUCROSE 200 MG: 20 INJECTION, SOLUTION INTRAVENOUS at 09:38

## 2022-05-09 RX ADMIN — INSULIN LISPRO 4 UNITS: 100 INJECTION, SOLUTION INTRAVENOUS; SUBCUTANEOUS at 11:59

## 2022-05-09 RX ADMIN — SODIUM CHLORIDE, PRESERVATIVE FREE 10 ML: 5 INJECTION INTRAVENOUS at 09:43

## 2022-05-09 RX ADMIN — CARVEDILOL 12.5 MG: 12.5 TABLET, FILM COATED ORAL at 09:39

## 2022-05-09 NOTE — PROGRESS NOTES
Angelina Delmy Initial Discharge Assessment    Met with Patient to discuss discharge plan. PCP: Alexander Fournier MD                                  Date of Last Visit: \" 3 weeks ago\"    If no PCP, list provided? N/A    Discharge Planning    Living Arrangements: Patient reports residing with sister and brother-in-law    Who helps you with your care:  self    If lives at home:     Do you have any barriers navigating in your home? No. Patient reports being on one floor at sister and brother-in-law's home    Patient can perform ADL? Yes    Current Services (outpatient and in home) :  None    Dialysis: No    Is transportation available to get to your appointments? Yes    DME Equipment:  yes - rollator, raised toilets, cane, shower chair and grab bars    Respiratory equipment: Nebulizer- prn    Respiratory provider:  no     Pharmacy:  yes - [facility] [place] [facility]      Able to afford cost of medication: Yes    Does patient feel safe at home? Yes    Does patient identify any home going needs? No    Patient agreeable to [place] [address]? No    Patient agreeable to SNF/Rehab? No    Other discharge needs identified? Other Patient identifies no DC or help at home needs at this time     Was Freedom of Choice Provided? Yes    Initial Discharge Plan? (Note: please see concurrent daily documentation for any updates after initial note). Chart reviewed. Patient was [facility] with a diagnosis of anemia. Patient's care discussed in daily quality rounds. Patient reported to have received blood products and IV venofer. Plan is for patient to DC on this date. This  met with patient to discuss DC planning and help at home needs. Upon visit patient is alert and sitting up in recliner chair. Patient is alert and oriented to person, place, and situation. Patient reports residing with sister and brother-in-law on one level.   Patient reports feeling safe and well supported by family. Patient reports being able to care for own ADLS and is an active . Discussed HHC. Patient reports no interest in [facility] at this time. Patient reports plan to return home with family independently. Patient identifies no DC needs at this time. SS to continue to follow as needed while patient is at [facility].     Electronically signed by JIMBO Burks on 5/9/2022 at 12:59 PM

## 2022-05-09 NOTE — DISCHARGE INSTR - COC
Continuity of Care Form    Patient Name: Rey Vazquez   :  1945  MRN:  [id number]    Admit date:  2022  Discharge date:  ***    Code Status Order: Full Code   Advance Directives:      Admitting Physician:  Monika Foley MD  PCP: Governor Rogelio MD    Discharging Nurse: [facility] Unit/Room#: 6341/8300-29  Discharging Unit Phone Number: ***    Emergency Contact:   Extended Emergency Contact Information  Primary Emergency Contact: [address] [place] [address] Phone: [phone]  Work Phone: [phone]  Mobile Phone: [phone]  Relation: Brother/Sister    Past Surgical History:  Past Surgical History:   Procedure Laterality Date    BLADDER SURGERY      cancer    BREAST SURGERY      HERNIA REPAIR      HYSTERECTOMY      UPPER GASTROINTESTINAL ENDOSCOPY N/A 2022    EGD ESOPHAGOGASTRODUODENOSCOPY performed by Elisa Vogel MD at [facility]       Immunization History:   Immunization History   Administered Date(s) Administered    COVID-19, [facility], Primary or Immunocompromised, PF, 100mcg/0.5mL 2021, 2021       Active Problems:  Patient Active Problem List   Diagnosis Code    Venous insufficiency (chronic) (peripheral) I87.2    Venous ulcer of right leg ([facility]) I83.019, L97.919    Edema leg R60.0    Obesity due to excess calories E66.09    Lymphedema of both lower extremities I89.0    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    Skin growth D49.2    Venous stasis ulcer of right calf with fat layer exposed without varicose veins ([facility]) I87.2, T45.819    PAD (peripheral artery disease) ([facility]) I73.9    DVT, lower extremity, distal, acute, bilateral ([facility]) I82.4Z3    CHF (congestive heart failure), NYHA class I, acute on chronic, combined ([facility] [address] I50.43    Anemia D64.9    GI bleed K92.2    Ac[facility] N17.9       Isolation/Infection:   Isolation            No Isolation          Patient Infection Status       Infection Onset Added Last Indicated Last Indicated By Review Planned Expiration Resolved Resolved By    MRSA 19 WOUND CULTURE        MRSA Right leg on 2019. Electronically signed by Sumit Case RN on 2019 at 7:36 AM     MRSA Right leg on 2019. Electronically signed by Sumit Case RN on 2019 at 8:15 AM     Resolved    COVID-19 (Rule Out) 21 COVID-19, Rapid (Ordered)   21 Rule-Out Test Resulted    INFLUENZA 19 Rapid Influenza A/B Antigens   20             Nurse Assessment:  Last Vital Signs: BP (!) 131/46   Pulse 91   Temp 98 °F (36.7 °C) (Oral)   Resp 18   Ht 5' (1.524 m)   Wt 250 lb 12.8 oz (113.8 kg)   SpO2 95%   BMI 48.98 kg/m²     Last documented pain score (0-10 scale):    Last Weight:   Wt Readings from Last 1 Encounters:   22 250 lb 12.8 oz (113.8 kg)     Mental Status:  {IP PT MENTAL STATUS:50169}    IV Access:  { SOLAGNE IV ACCESS:846862672}    Nursing Mobility/ADLs:  Walking   {CHP DME REGC:593122925}  Transfer  {CHP DME NBRO:084114029}  Bathing  {CHP DME EKHP:831901650}  Dressing  {CHP DME ONOM:493885373}  Toileting  {CHP DME UYZI:552837494}  Feeding  {CHP DME WBTF:835045666}  Med Admin  {CHP DME EARE:023098616}  Med Delivery   { SOLANGE MED Delivery:892356696}    Wound Care Documentation and Therapy:        Elimination:  Continence: Bowel: {YES / IA:11731}  Bladder: {YES / XK:54805}  Urinary Catheter: {Urinary Catheter:384292440}   Colostomy/Ileostomy/Ileal Conduit: {YES / GT:36995}       Date of Last BM: ***    Intake/Output Summary (Last 24 hours) at 2022 1402  Last data filed at 2022 1730  Gross per 24 hour   Intake 360 ml   Output --   Net 360 ml     I/O last 3 completed shifts:   In: 743.7 [P.O.:360; Blood:383.7]  Out: -     Safety Concerns:     [address] Safety Concerns:430221165}    Impairments/Disabilities:      [address] Impairments/Disabilities:193317697}    Nutrition Therapy:  Current Nutrition Therapy:   Eren[address] Muse SOLANGE Diet List:483029592}    Routes of Feeding: {CHP DME Other Feedings:829218237}  Liquids: {Slp liquid thickness:22760}  Daily Fluid Restriction: {CHP DME Yes amt example:077891507}  Last Modified Barium Swallow with Video (Video Swallowing Test): {Done Not Done IWGS:306396738}    Treatments at the Time of Hospital Discharge:   Respiratory Treatments: ***  Oxygen Therapy:  {Therapy; copd oxygen:60948}  Ventilator:    { CC Vent CHPR:222341936}    Rehab Therapies: {THERAPEUTIC INTERVENTION:3635375227}  Weight Bearing Status/Restrictions: { CC Weight Bearin}  Other Medical Equipment (for information only, NOT a DME order):  {EQUIPMENT:654388976}  Other Treatments: ***    Patient's personal belongings (please select all that are sent with patient):  {[facility] DME Belongings:222219155}    RN SIGNATURE:  {Esignature:570978265}    CASE MANAGEMENT/SOCIAL WORK SECTION    Inpatient Status Date: 22    Readmission Risk Assessment Score:  Readmission Risk              Risk of Unplanned Readmission:  18           Discharging to Facility/ Agency     Patient reports no interest in [address] at this time    / signature: Electronically signed by JIMBO Gardner on 2022 at 2:03 PM      PHYSICIAN SECTION    Prognosis: {Prognosis:0219388064}    Condition at Discharge: [address] Patient Condition:804181635}    Rehab Potential (if transferring to Rehab): {Prognosis:8611401352}    Recommended Labs or Other Treatments After Discharge: ***    Physician Certification: I certify the above information and transfer of Nilesh Sequeira  is necessary for the continuing treatment of the diagnosis listed and that she requires {Admit to Appropriate Level of Care:07537} for {GREATER/LESS:515073666} 30 days.      Update Admission H&P: {CHP DME Changes in FBFCL:967869689}    PHYSICIAN SIGNATURE:  {Esignature:662380666}

## 2022-05-09 NOTE — DISCHARGE SUMMARY
Hospital Medicine Discharge Summary    Alex Jackson  :  1945  MRN:  [id number]    Admit date:  2022  Discharge date:  2022    Admitting Physician:  Russ Isaac MD  Primary Care Physician:  June Hurtado MD      Discharge Diagnoses:      *GI bleed in the setting of patient had taken blood thinner Xarelto. Xarelto likely contributing and causing the GI bleed. Highly suspicious of upper GI bleed due to black color stool. EGD did not show any active bleed. Continue PPI. Plan for colonoscopy in the next 4 weeks. Hemoglobin is stable post transfusion.     *Acute/subacute/chronic blood loss anemia. Patient asymptomatic. Status post 2 units of RBC transfusion. Hemoglobin is stable over the last 24 hours posttransfusion.     *Severe iron deficiency anemia. Most likely secondary to above. I started the patient on iron infusion. Patient will be discharged home on oral iron.     *Dizziness, lightheadedness secondary to above. Secondary to anemia.     *Cardiomyopathy, ejection fraction 20%.  Euvolemic at this time.     *History of DVT in the legs dating back to 2020. I discussed with the patient risk and the benefit of anticoagulation. Given her severe anemia and iron deficiency and the fact that she had the DVT more than a year and a half ago patient does not want to take her Xarelto at this time.     *Patient reported history of a renal mass which is been followed and investigated by urologist Dr. Erin Knowles at [facility].     *Morbid obesity.     *Hypertension. Hypotension had resolved. Blood pressure systolically is peaking at 145.     *Diabetes. Fair control, continue to monitor.     *Other medical problems not listed above. Hospital Course:   Alex Jackson is a 69 y.o. female that was admitted and treated at [facility] for the aforementioned medical issues. Patient had received 2 units of RBC transfusion. Patient had an EGD which came back negative for active bleed.   Plan is for patient to have colonoscopy electively. Hemoglobin is stable posttransfusion. Patient will be taken off her Xarelto. Patient does not want to take it for now. Patient has multiple medical issues. Patient is feeling great. Hypotension had resolved. Hemoglobin is stable. Patient is ready physically and psychologically to go home I do not have clear or strong clinical justification to extend inpatient hospitalization against her will and desire to go home. Patient however would definitely need and require close and frequent monitoring as well as additional work-up, investigation and therapeutic intervention that potentially could take place in the outpatient setting by primary care doctor in collaboration with other specialists. Patient will need to follow-up with her cardiologist and urologist at the [address] [place] [address]. They are monitoring renal mass according to the patient. Patient is to follow-up with Dr. Soheila Dominguez to schedule for an elective colonoscopy. Patient was instructed to return back to the emergency room with any evidence of bloody stool, abdominal pain, worsening dizziness or lightheadedness. In addition I instructed the patient to have a CBC next week at PCP office to ensure stability of her hemoglobin. Patient was seen by the following consultants while admitted to [facility]:   Consults:  IP CONSULT TO GI    Significant Diagnostic Studies:    XR CHEST (2 VW)    Result Date: 5/7/2022  EXAMINATION:  CHEST RADIOGRAPH (2 VIEW AP/LATERAL) Exam Date/Time:  5/6/2022 8:17 PM Clinical History:   dizzy Comparison:  3/20/2021  FINDINGS: Lines, tubes, and devices:  None. Cardiomediastinal silhouette:  Stable heart size within normal limits. Lungs and pleura:  No focal consolidation, pleural effusion or pneumothorax. Other: Postoperative changes left axilla. No acute cardiopulmonary process.      CT Head WO Contrast    Result Date: 5/7/2022  EXAMINATION:  CT HEAD WO CONTRAST HISTORY: dizzy  - TECHNIQUE: CT head without contrast. All CT scans at this facility use dose modulation, iterative reconstruction, and/or weight based dosing when appropriate to reduce radiation dose to as low as reasonably achievable. COMPARISON:  None. RESULT: Post-operative change:  None. Acute change:   No evidence of an acute intracranial process. Hemorrhage:    No evidence of acute intracranial hemorrhage. Mass Lesion / Mass Effect:   No evidence of an intracranial mass or extraaxial fluid collection. No significant mass effect. Chronic change:   Scattered patchy foci of low attenuation are present within supratentorial white matter which is a nonspecific finding but likely represents mild microvascular ischemia. Atherosclerotic calcification of the carotid siphons and vertebrobasilar arteries. Parenchyma:  Mild generalized volume loss. Ventricles:   Ventricular enlargement concordant with the degree of parenchymal volume loss. Other:  Mild paranasal sinus mucosal thickening. Mastoid cells are clear. Status post right lens replacements. No acute intracranial abnormality. Sequela of chronic microvascular ischemia and involutional changes. Discharge Medications:         Medication List      START taking these medications    ferrous sulfate 325 (65 Fe) MG tablet  Commonly known as: IRON 325  Take 1 tablet by mouth 2 times daily     pantoprazole 40 MG tablet  Commonly known as: PROTONIX  Take 1 tablet by mouth every morning (before breakfast)  Start taking on:  May 10, 2022        CONTINUE taking these medications    acetaminophen 325 MG tablet  Commonly known as: TYLENOL     atorvastatin 10 MG tablet  Commonly known as: LIPITOR     carvedilol 12.5 MG tablet  Commonly known as: COREG  Take 1 tablet by mouth 2 times daily (with meals)     Corlanor 7.5 MG Tabs  Generic drug: Ivabradine HCl     Ecotrin Low Strength 81 MG EC tablet  Generic drug: aspirin     furosemide 40 MG tablet  Commonly known as: LASIX  Take 1 tablet by mouth daily     HumaLOG KwikPen 100 UNIT/ML pen  Generic drug: insulin lispro     Lantus SoloStar 100 UNIT/ML injection pen  Generic drug: insulin glargine     losartan 25 MG tablet  Commonly known as: COZAAR  Take 0.5 tablets by mouth 2 times daily     metOLazone 2.5 MG tablet  Commonly known as: ZAROXOLYN     mirabegron 50 MG Tb24  Commonly known as: MYRBETRIQ     potassium chloride 10 MEQ extended release tablet  Commonly known as: KLOR-CON M  Take 1 tablet by mouth daily     ProAir  (90 Base) MCG/ACT inhaler  Generic drug: albuterol sulfate HFA     sertraline 25 MG tablet  Commonly known as: ZOLOFT     spironolactone 25 MG tablet  Commonly known as: ALDACTONE  Take 1 tablet by mouth daily     sulfaSALAzine 500 MG tablet  Commonly known as: AZULFIDINE        STOP taking these medications    ammonium lactate 12 % lotion  Commonly known as: LAC-HYDRIN     rivaroxaban 15 MG Tabs tablet  Commonly known as: [place] Fire           Where to Get Your Medications      These medications were sent to [address] [place] [address] [address] [facility] Pat[facility] [phone]  [address] [place] [address], [facility]    Phone: [phone]   · ferrous sulfate 325 (65 Fe) MG tablet  · pantoprazole 40 MG tablet         Disposition:   Discharged to Home. Any [facility] needs that were indicated and/or required as been addressed and set up by Social Work. Condition at discharge: Pt was medically stable at the time of discharge. Significant improvement in clinical condition compared to initial condition at presentation to hospital    Activity: activity as tolerated, fall precautions. Total time taken for discharging this patient: 40 minutes. Greater than 70% of time was spent focused exclusively on this patient. Time was taken to review chart, discuss plans with consultants, reconciling medications, discussing plan answering questions with patient.      Keyla Hall MD  5/9/2022, 10:18 AM  ----------------------------------------------------------------------------------------------------------------------    Williams Vargas,     Please return to ER or call 911 if you develop any significant signs or symptoms. I may not have addressed all of your medical illnesses or the abnormal blood work or imaging therefore please ask your PCP Geo Overton MD and other out patient specialists and providers  to obtain [facility] record entirely to follow up on all of the abnormal labs, imaging and findings that I have and have not addressed during your hospitalization. Discharging you from the hospital does not mean that your medical care ends here and now. You may still need additional work up, investigation, monitoring, and treatment to be handled from this point on by outside providers including your PCP, Geo Overton MD , Specialists and other healthcare providers. Please review your list of discharge medications prior to resuming medications you might still have at home, as the medications you need to be taking, dosages or how often you must take them may have changed. For medication questions, contact your retail pharmacy and your PCP, Geo Overton MD .     ** I STRONGLY RECOMMEND that you follow up with Geo Overton MD within 3 to 5 days for a post hospitalization evaluation. This specific office visit is covered by your insurance, and is not the same as your annual doctor visit/ check up. This office visit is important, as it may prevent need for repeat and/or future hospitalizations. **    Your medical team at [facility] ([facility]) appreciates the opportunity to work with you to get well! Sincerely,  Efra Hodges MD Follow up with Dr. Geo Overton MD in the next 7 days or sooner if needed. Please return to ER or call 911 if you develop any significant signs or symptoms.     I may or may not have addressed all of your symptoms, medical issues,  Illnesses, or all of the abnormal blood work or imaging therefore please ask your PCP and other specialists to obtain [facility] record entirely to follow up on all of your symptoms, illnesses, abnormal labs, imaging and findings that I have and have not addressed during your hospitalization. Discharging you from the hospital does not mean that your medical care ends here and now. You may still need to have additional work up, investigation, monitoring, surveillance, and treatment to be handled from this point on by in the out patient setting by  out patient providers including your PCP, Specialists and other healthcare providers. For medication questions, contact your retail pharmacy and your PCP. Your medical team at [facility] ([facility]) appreciates the opportunity to work with you to get well!     Shauna Chadwick MD

## 2022-06-04 ENCOUNTER — APPOINTMENT (OUTPATIENT)
Dept: GENERAL RADIOLOGY | Age: 77
End: 2022-06-04
Payer: MEDICARE

## 2022-06-04 ENCOUNTER — HOSPITAL ENCOUNTER (EMERGENCY)
Age: 77
Discharge: HOME | End: 2022-06-04
Attending: EMERGENCY MEDICINE
Payer: MEDICARE

## 2022-06-04 VITALS
HEART RATE: 85 BPM | TEMPERATURE: 97.7 F | OXYGEN SATURATION: 96 % | WEIGHT: 248 LBS | SYSTOLIC BLOOD PRESSURE: 136 MMHG | RESPIRATION RATE: 18 BRPM | BODY MASS INDEX: 48.69 KG/M2 | DIASTOLIC BLOOD PRESSURE: 73 MMHG | HEIGHT: 60 IN

## 2022-06-04 DIAGNOSIS — S30.0XXA COCCYX CONTUSION, INITIAL ENCOUNTER: ICD-10-CM

## 2022-06-04 DIAGNOSIS — S30.0XXA SACRAL CONTUSION, INITIAL ENCOUNTER: ICD-10-CM
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
## 2022-06-04 PROCEDURE — 6370000000 HC RX 637 (ALT 250 FOR IP): Performed by: EMERGENCY MEDICINE

## 2022-06-04 PROCEDURE — 72110 X-RAY EXAM L-2 SPINE 4/>VWS: CPT

## 2022-06-04 PROCEDURE — 72220 X-RAY EXAM SACRUM TAILBONE: CPT

## 2022-06-04 PROCEDURE — 99283 EMERGENCY DEPT VISIT LOW MDM: CPT

## 2022-06-04 RX ORDER — ACETAMINOPHEN 500 MG
500 TABLET ORAL 4 TIMES DAILY PRN
Qty: 120 TABLET | Refills: 0 | Status: SHIPPED | OUTPATIENT
Start: 2022-06-04

## 2022-06-04 RX ORDER — ACETAMINOPHEN 500 MG
1000 TABLET ORAL ONCE
Status: COMPLETED | OUTPATIENT
Start: 2022-06-04 | End: 2022-06-04

## 2022-06-04 RX ADMIN — ACETAMINOPHEN 1000 MG: 500 TABLET ORAL at 23:11

## 2022-06-04 ASSESSMENT — PAIN DESCRIPTION - ORIENTATION: ORIENTATION: LOWER;MID

## 2022-06-04 ASSESSMENT — ENCOUNTER SYMPTOMS
PHOTOPHOBIA: 0
EYE PAIN: 0
SHORTNESS OF BREATH: 0
ABDOMINAL PAIN: 0
DIARRHEA: 0
COUGH: 0
VOMITING: 0
NAUSEA: 0
COLOR CHANGE: 0
RHINORRHEA: 0
SORE THROAT: 0
ABDOMINAL DISTENTION: 0
APNEA: 0
CONSTIPATION: 0
WHEEZING: 0
BACK PAIN: 0
SINUS PRESSURE: 0

## 2022-06-04 ASSESSMENT — PAIN DESCRIPTION - DESCRIPTORS: DESCRIPTORS: ACHING

## 2022-06-04 ASSESSMENT — PAIN DESCRIPTION - LOCATION: LOCATION: BACK

## 2022-06-04 ASSESSMENT — PAIN SCALES - GENERAL: PAINLEVEL_OUTOF10: 3

## 2022-06-04 ASSESSMENT — LIFESTYLE VARIABLES: HOW OFTEN DO YOU HAVE A DRINK CONTAINING ALCOHOL: NEVER

## 2022-06-05 NOTE — ED PROVIDER NOTES
[facility] ED  eMERGENCY dEPARTMENT eNCOUnter      Pt Name: Monalisa Hope  MRN: [id number]  [place] 1945  Date of evaluation: 6/4/2022  Provider: Sky Nichols MD    CHIEF COMPLAINT       Chief Complaint   Patient presents with    Fall         HISTORY OF PRESENT ILLNESS   (Location/Symptom, Timing/Onset,Context/Setting, Quality, Duration, Modifying Factors, Severity)  Note limiting factors. Monalisa Hope is a 69 y.o. female who presents to the emergency department with complaint of low back and tailbone pain following a trip and fall incident while getting out of her car. Denies head injury. Has bad knees and required EMS to help up. She is morbidly obese with BMI of 48.43. Other comorbid conditions includes chronic stasis dermatitis of both lower extremities, lymphedema of both lower extremities, elephantiasis nostra varicose, peripheral artery disease, peripheral venous insufficiency, thromboembolism with DVTs, congestive heart failure. Pain is 2 in a scale of 1-10 and sharp. HPI    Nursing Notes were reviewed. REVIEW OF SYSTEMS    (2-9 systems for level 4, 10 or more for level 5)     Review of Systems   Constitutional: Negative. Negative for activity change, appetite change, chills, fatigue and fever. HENT: Negative for congestion, ear discharge, ear pain, hearing loss, rhinorrhea, sinus pressure and sore throat. Eyes: Negative for photophobia, pain and visual disturbance. Respiratory: Negative for apnea, cough, shortness of breath and wheezing. Cardiovascular: Negative for chest pain, palpitations and leg swelling. Gastrointestinal: Negative for abdominal distention, abdominal pain, constipation, diarrhea, nausea and vomiting. Endocrine: Negative for cold intolerance, heat intolerance and polyuria. Genitourinary: Negative for dysuria, flank pain, frequency and urgency. Musculoskeletal: Positive for arthralgias.  Negative for back pain, gait problem, myalgias and neck stiffness. Skin: Negative for color change, pallor and rash. Allergic/Immunologic: Negative for food allergies and immunocompromised state. Neurological: Negative for dizziness, tremors, syncope, weakness, light-headedness and headaches. Psychiatric/Behavioral: Negative for agitation, confusion and hallucinations. All other systems reviewed and are negative. Except as noted above the remainder of the review of systems was reviewed and negative.        PAST MEDICAL HISTORY     Past Medical History:   Diagnosis Date    Cancer [facility])     bladder, breast,     CHF (congestive heart failure) (HCC)     Hyperlipidemia     Hypertension     Osteoarthritis     Type II or unspecified type diabetes mellitus without mention of complication, not stated as uncontrolled          SURGICAL HISTORY       Past Surgical History:   Procedure Laterality Date    BLADDER SURGERY      cancer    BREAST SURGERY      HERNIA REPAIR      HYSTERECTOMY      UPPER GASTROINTESTINAL ENDOSCOPY N/A 5/7/2022    EGD ESOPHAGOGASTRODUODENOSCOPY performed by Rosario Glez MD at [address] [place] [address]       Discharge Medication List as of 6/4/2022 11:19 PM      CONTINUE these medications which have NOT CHANGED    Details   pantoprazole (PROTONIX) 40 MG tablet Take 1 tablet by mouth every morning (before breakfast), Disp-30 tablet, R-3Normal      ferrous sulfate (IRON 325) 325 (65 Fe) MG tablet Take 1 tablet by mouth 2 times daily, Disp-180 tablet, R-1Normal      CORLANOR 7.5 MG TABS Take 7.5 mg by mouth daily, DAWHistorical Med      mirabegron (MYRBETRIQ) 50 MG TB24 Take 50 mg by mouth dailyHistorical Med      atorvastatin (LIPITOR) 10 MG tablet Take 10 mg by mouth dailyHistorical Med      metOLazone (ZAROXOLYN) 2.5 MG tablet Take 2.5 mg by mouth three times a weekHistorical Med      spironolactone (ALDACTONE) 25 MG tablet Take 1 tablet by mouth daily, Disp-30 tablet, R-1Normal      carvedilol (COREG) 12.5 MG tablet Take 1 tablet by mouth 2 times daily (with meals), Disp-60 tablet, R-3Normal      furosemide (LASIX) 40 MG tablet Take 1 tablet by mouth daily, Disp-60 tablet, R-3Normal      losartan (COZAAR) 25 MG tablet Take 0.5 tablets by mouth 2 times daily, Disp-30 tablet, R-3Normal      potassium chloride (KLOR-CON M) 10 MEQ extended release tablet Take 1 tablet by mouth daily, Disp-60 tablet, R-3Normal      !! acetaminophen (TYLENOL) 325 MG tablet Take 650 mg by mouth every 6 hours as neededHistorical Med      sertraline (ZOLOFT) 25 MG tablet Take 25 mg by mouth dailyHistorical Med      albuterol sulfate HFA (PROAIR HFA) 108 (90 Base) MCG/ACT inhaler USE 1 INHALATION EVERY 6 HOURS AS NEEDED FOR WHEEZING OR SHORTNESS OF BREATH AS DIRECTEDHistorical Med      sulfaSALAzine (AZULFIDINE) 500 MG tablet Take 1,500 mg by mouth 2 times dailyHistorical Med      aspirin (ECOTRIN LOW STRENGTH) 81 MG EC tablet Take 81 mg by mouth      insulin glargine (LANTUS SOLOSTAR) 100 UNIT/ML injection pen INJECT 22 UNITS AT BEDTIME      insulin lispro (HUMALOG KWIKPEN) 100 UNIT/ML pen Inject 6 to 8 units before breakfast, 8 units with lunch and 12 units at dinner       !! - Potential duplicate medications found. Please discuss with provider. ALLERGIES     Rocephin [ceftriaxone sodium], Clindamycin, Clindamycin/lincomycin, Minocycline, Tetracyclines & related, and Ceftriaxone    FAMILY HISTORY     History reviewed. No pertinent family history.        SOCIAL HISTORY       Social History     Socioeconomic History    Marital status: Single     Spouse name: None    Number of children: None    Years of education: None    Highest education level: None   Occupational History    Occupation:    Tobacco Use    Smoking status: Former Smoker    Smokeless tobacco: Never Used    Tobacco comment: quit 40 years ago   Vaping Use    Vaping Use: Never used   Substance and Sexual Activity    Alcohol use: No    Drug use: No    Sexual activity: None   Other Topics Concern    None   Social History Narrative    None     Social Determinants of Health     Financial Resource Strain:     Difficulty of Paying Living Expenses: Not on file   Food Insecurity:     Worried About Running Out of Food in the Last Year: Not on file    David of Food in the Last Year: Not on file   Transportation Needs:     Lack of Transportation (Medical): Not on file    Lack of Transportation (Non-Medical): Not on file   Physical Activity:     Days of Exercise per Week: Not on file    Minutes of Exercise per Session: Not on file   Stress:     Feeling of Stress : Not on file   Social Connections:     Frequency of Communication with Friends and Family: Not on file    Frequency of Social Gatherings with Friends and Family: Not on file    Attends Congregational Services: Not on file    Active Member of [address] [place] [address] [employer] or Organizations: Not on file    Attends Club or Organization Meetings: Not on file    Marital Status: Not on file   Intimate Partner Violence:     Fear of Current or Ex-Partner: Not on file    Emotionally Abused: Not on file    Physically Abused: Not on file    Sexually Abused: Not on file   Housing Stability:     Unable to Pay for Housing in the Last Year: Not on file    Number of [place] in the Last Year: Not on file    Unstable Housing in the Last Year: Not on file       SCREENINGS    [place] Coma Scale  Eye Opening: Spontaneous  Best Verbal Response: Oriented  Best Motor Response: Obeys commands  [place] Coma Scale Score: 15        PHYSICAL EXAM    (up to 7 for level 4, 8 or more for level 5)     ED Triage Vitals [06/04/22 2232]   BP Temp Temp src Heart Rate Resp SpO2 Height Weight   136/73 97.7 °F (36.5 °C) -- 85 18 96 % 5' (1.524 m) 248 lb (112.5 kg)       Physical Exam  Vitals and nursing note reviewed. Constitutional:       General: She is not in acute distress. Appearance: Normal appearance. She is well-developed. She is obese.  She is not dermatitis and lichenification of both lower extremities. Neurological:      General: No focal deficit present. Mental Status: She is alert and oriented to person, place, and time. Mental status is at baseline. Cranial Nerves: No cranial nerve deficit. Sensory: No sensory deficit. Motor: No weakness or abnormal muscle tone. Coordination: Coordination normal.      Gait: Gait normal.      Deep Tendon Reflexes: Reflexes are normal and symmetric. Reflexes normal.   Psychiatric:         Mood and Affect: Mood normal.         Behavior: Behavior normal.         Thought Content: Thought content normal.         Judgment: Judgment normal.         DIAGNOSTIC RESULTS     EKG: All EKG's are interpreted by the Emergency Department Physician who either signs or Co-signs this chart in the absence of a cardiologist.        RADIOLOGY:   Non-plain film images such as CT, Ultrasound and MRI are read by the radiologist. Sri Cordero radiographicimages are visualized and preliminarily interpreted by the emergency physician with the below findings:    X-ray of the lumbar spine shows severe degenerative arthritis. No acute fractures. X-ray of the sacrum and coccyx shows no acute fractures. Interpretation per the Radiologist below, if available at the time of this note:    XR LUMBAR SPINE (MIN 4 VIEWS)    (Results Pending)   XR SACRUM COCCYX (MIN 2 VIEWS)    (Results Pending)         ED BEDSIDE ULTRASOUND:   Performed by ED Physician - none    LABS:  Labs Reviewed - No data to display    All other labs were within normal range or not returned as of this dictation.     EMERGENCY DEPARTMENT COURSE and DIFFERENTIALDIAGNOSIS/MDM:   Vitals:    Vitals:    06/04/22 2232   BP: 136/73   Pulse: 85   Resp: 18   Temp: 97.7 °F (36.5 °C)   SpO2: 96%   Weight: 248 lb (112.5 kg)   Height: 5' (1.524 m)           MDM  Number of Diagnoses or Management Options     Amount and/or Complexity of Data Reviewed  Tests in the radiology section of CPT®: reviewed and ordered    Risk of Complications, Morbidity, and/or Mortality  Presenting problems: moderate  Diagnostic procedures: moderate  Management options: moderate    Patient Progress  Patient progress: improved      CRITICAL CARE TIME   Total Critical Care time was  minutes, excluding separately reportable procedures. There was a high probability of clinically significant/life threatening deterioration in the patient's condition which required my urgentintervention. CONSULTS:  None    PROCEDURES:  Unless otherwise noted below, none     Procedures    FINAL IMPRESSION      1. Fall, initial encounter    2. Sacral contusion, initial encounter    3. Coccyx contusion, initial encounter          DISPOSITION/PLAN   DISPOSITION        PATIENT REFERRED TO:  Yajaira Samuel MD  [address] [facility] [address]  [phone]    In 3 days        DISCHARGE MEDICATIONS:  Discharge Medication List as of 6/4/2022 11:19 PM      START taking these medications    Details   !! acetaminophen (TYLENOL) 500 MG tablet Take 1 tablet by mouth 4 times daily as needed for Pain, Disp-120 tablet, R-0Normal       !! - Potential duplicate medications found. Please discuss with provider.              (Please note that portions of this note were completed with a voice recognitionprogram.  Efforts were made to edit the dictations but occasionally words are mis-transcribed.)    Jann Durant MD (electronically signed)  Attending Emergency Physician          Jann Durant MD  06/04/22 Yumiko Brush MD  06/04/22 1917

## 2024-04-26 ENCOUNTER — OFFICE VISIT (OUTPATIENT)
Dept: ORTHOPEDIC SURGERY | Facility: CLINIC | Age: 79
End: 2024-04-26
Payer: MEDICARE

## 2024-04-26 VITALS — WEIGHT: 215 LBS | HEIGHT: 60 IN | BODY MASS INDEX: 42.21 KG/M2

## 2024-04-26 DIAGNOSIS — M17.0 BILATERAL PRIMARY OSTEOARTHRITIS OF KNEE: Primary | ICD-10-CM

## 2024-04-26 PROCEDURE — 99213 OFFICE O/P EST LOW 20 MIN: CPT | Performed by: ORTHOPAEDIC SURGERY

## 2024-04-26 NOTE — PROGRESS NOTES
History: Justine is here for both knees.  She gets about 6 months of relief with the gel injection.  Her last injection was back in September.  She has started on Mounjaro and lost nearly 40 pounds.  She feels like the knees are doing better since she has lost weight.    Past medical history: Multiple  Medications: Multiple  Allergies: No known drug allergies    Please refer to the intake H&P regarding the patient's review of systems, family history and social history as was done today    HEENT: Normal  Lungs: Clear to auscultation  Heart: RRR  Abdomen: Soft, nontender  Skin: clear  Extremity: She has 1+ crepitus with range of motion.  There is tenderness both medially and laterally at the joint line today.  Slight laxity with varus stress.  Negative Lachman posterior drawer.  Skin is clear.  She denies any numbness or tingling distally.  Contralateral exam is normal for strength, motion, stability and neurovascular assessment.    Radiographs: Previous x-rays show end-stage DJD with bone-on-bone articulation medially    Assessment: End-stage bilateral knee DJD    Plan: She has lost a significant amount of weight which is helping her knees.  She also gets about 6 months of relief with viscosupplementation.  She would like to pursue this route again.  We will submit for approval and see her back for the injections.    All questions were answered today with the patient.    For complete plan and/or surgical details, please refer to Dr. Fischer's portion of this split/shared dictation.   Kendra Gifford PA-C    In a face-to-face encounter today, KEENAN Fischer MD performed a history and physical examination, discussed pertinent diagnostic studies if indicated, and discussed diagnosis and management strategies with both the patient and the midlevel provider.  I reviewed the midlevel's note and agree with the documented findings and plan of care.  Greater than 50% of the evaluation and treatment decision was  performed by the physician myself during today's visit.  Overall she is doing much better with her weight loss.  She still gets occasional knee pain and feels like the gel has worn off.  She gets decent relief with gel injections and would like to pursue this again.  Risk and benefits were noted and we will see her back upon approval for gel injections.  She will continue to work on her weight loss regimen as well.      This note was generated with voice recognition software and may contain grammatical errors.

## 2024-05-13 ENCOUNTER — OFFICE VISIT (OUTPATIENT)
Dept: ORTHOPEDIC SURGERY | Facility: CLINIC | Age: 79
End: 2024-05-13
Payer: MEDICARE

## 2024-05-13 DIAGNOSIS — M17.0 BILATERAL PRIMARY OSTEOARTHRITIS OF KNEE: Primary | ICD-10-CM

## 2024-05-13 PROCEDURE — 20610 DRAIN/INJ JOINT/BURSA W/O US: CPT | Mod: 50 | Performed by: ORTHOPAEDIC SURGERY

## 2024-05-13 PROCEDURE — 2500000004 HC RX 250 GENERAL PHARMACY W/ HCPCS (ALT 636 FOR OP/ED): Mod: JZ | Performed by: ORTHOPAEDIC SURGERY

## 2024-05-13 RX ADMIN — Medication 48 MG: at 11:38

## 2024-05-13 NOTE — PROGRESS NOTES
Justine is here for bilateral Synvisc 1 injections.    L Inj/Asp: bilateral knee on 5/13/2024 11:38 AM  Indications: pain  Details: 18 G needle, lateral approach  Medications (Right): 48 mg hylan 48 mg/6 mL  Medications (Left): 48 mg hylan 48 mg/6 mL  Outcome: tolerated well, no immediate complications  Procedure, treatment alternatives, risks and benefits explained, specific risks discussed. Consent was given by the patient. Immediately prior to procedure a time out was called to verify the correct patient, procedure, equipment, support staff and site/side marked as required. Patient was prepped and draped in the usual sterile fashion.         She will ice and work on range of motion and exercises as directed.    She will follow up on an as-needed basis.  She would like to do some physical therapy for lower extremity strengthening and stability as well.

## 2024-05-28 ENCOUNTER — EVALUATION (OUTPATIENT)
Dept: PHYSICAL THERAPY | Facility: CLINIC | Age: 79
End: 2024-05-28
Payer: MEDICARE

## 2024-05-28 DIAGNOSIS — M17.0 BILATERAL PRIMARY OSTEOARTHRITIS OF KNEE: ICD-10-CM

## 2024-05-28 DIAGNOSIS — M62.81 MUSCLE WEAKNESS (GENERALIZED): Primary | ICD-10-CM

## 2024-05-28 PROCEDURE — 97110 THERAPEUTIC EXERCISES: CPT | Performed by: PHYSICAL THERAPIST

## 2024-05-28 PROCEDURE — 97162 PT EVAL MOD COMPLEX 30 MIN: CPT | Performed by: PHYSICAL THERAPIST

## 2024-05-28 ASSESSMENT — ENCOUNTER SYMPTOMS
DEPRESSION: 0
OCCASIONAL FEELINGS OF UNSTEADINESS: 1
LOSS OF SENSATION IN FEET: 0

## 2024-05-28 ASSESSMENT — PAIN SCALES - GENERAL: PAINLEVEL_OUTOF10: 0 - NO PAIN

## 2024-05-28 ASSESSMENT — PATIENT HEALTH QUESTIONNAIRE - PHQ9
1. LITTLE INTEREST OR PLEASURE IN DOING THINGS: NOT AT ALL
SUM OF ALL RESPONSES TO PHQ9 QUESTIONS 1 AND 2: 0
2. FEELING DOWN, DEPRESSED OR HOPELESS: NOT AT ALL

## 2024-05-28 NOTE — PROGRESS NOTES
"Physical Therapy    Physical Therapy Evaluation and Treatment      Patient Name: Justine Muse  MRN: [id number]  Today's Date: 5/28/2024    Insurance: [employer]  Allowed visits: BMN    Subjective  HPI: Patient with OA in bilateral knees. She has undergone cortisone injections for several years and has now had 3 rounds of gel injections with some relief. She has stiffness but notes mostly weakness in bilateral LE stating \"I can't get up without pulling on something\". She also has difficulty with negotiating steps stating \"I don't have the power\". She was taking a weight loss medication and has lost almost 50 lbs in the last 6 months. She is trying to eat a lot of protein to avoid losing muscle mass. She is seeing an endocrinology dietician who has given her some suggestions for her diet. Chief complaint currently is \"the muscles in my legs, I don't feel confident in doing things\". She has not been able to negotiate steps \"for years\". She has participated in physical therapy in the past with some relief and improvement in strength. She walks 3 times per week about a quarter of a mile with her rollator. She notices that she evelio more than she has previously. She has used a rollator for years. Biggest limitation is \"my stamina coupled with walking\". She takes Tylenol for knee pain at night. PMH is positive for HTN, bladder CA, breast CA with chemotherapy and left mastectomy in 2002, CAD, CHF, neuropathy, OA, NID-DM, history of UTI, and total hysterectomy. Her left knee is more symptomatic than her right.    Referring physician: Nitin Fischer MD - F/U as needed; 6 months likely for another gel shot.   PCP: Russ Waldron MD    Living environment: lives in a 1 story home with ramp to enter. She lives with her sister and has done so since 2020. There is a 1/2 step to enter her sister's home.   Work: retired    Patient-specific goal: \"to be able to walk upstairs, be able to stand unassisted, and be able to " "walk unassisted too\".     Objective  Precautions: STEADI score is 8    Observation: FHP, excessive thoracic kyphosis.     Gait Assessment: ambulates with rollator with forward flexed posture and slow conchita.     AROM  Right knee flexion: 102 degrees    Left knee flexion: 105 degrees  Right knee extension: -5 degrees P!    Left knee extension: -4 degrees    MMT  Right quadriceps: 4+    Left quadriceps: 4+  Right iliopsoas: 4    Left iliopsoas: 4  Unable to roll from supine to sidelying, either to right or left  Unable to lay prone  Able to bridge     Flexibility  Right hamstring: slightly limited    Left hamstring: slightly limited  Right gastrocnemius: limited    Left gastrocnemius: slightly limited    30 sec Sit to Stand: 0  Tinetti Gait and Balance: 15 (high risk)    LEFS: 13%    Assessment  80 yo female with > 1 year history of present condition and presence of 15 personal factors and/or comorbidities that impact the plan of care including age of 79, chronicity of symptoms, and PMH of HTN, bladder CA, breast CA with chemotherapy and left mastectomy in 2002, CAD, CHF, neuropathy, bilateral knee OA, NID-DM, history of UTI, and total hysterectomy presents with decreased AROM, decreased strength, fall risk, and slight decreased flexibility effecting the following body structures and functions: bilateral LE body regions and musculoskeletal body systems including the knees. Activity limitations and participation restrictions include decreased ability to perform functional transfers and ambulate efficiently. Justine will therefore benefit from PT management to establish a HEP and promote improved functional strength. The clinical presentation of this patient is evolving and their history and examination findings are consistent with a moderate complexity evaluation. Fair potential.    Treatment provided today: Initial evaluation completed. Discussed objective findings and goals of skilled care. Instructed patient " "in therapeutic exercise and HEP with handout outlining specific parameters provided (bridges x10, seated ball squeeze 10\"x10, LAQ with ball 5\"x10, sit to stands with UE assist 2x5). Agreed upon POC and answered all questions.    86662 - 22 minutes, 1 unit untimed  01517 - 23 minutes, 2 units    Plan  Recommending PT management 2x/week for 4 weeks, 8 visits.     Goals  Independent with HEP to expedite progress and promote goal achievement.   Increase LEFS to > or equal to 50% functional for increased functional ability.  Increase strength bilateral iliopsoas to 4+/5 and bilateral gluteus medius to > or equal to 4/5 for improved gait mechanics and decreased fall risk.   Patient demonstrate ability to perform > or equal to 5 sit to stands in 30 seconds for evidence of improved functional strength.   Increase Tinetti score to > or equal to 20 for evidence of decreased fall risk.   Patient ambulate > or equal to 50 feet with LRAD with more upright posture and increased conchita of improved gait efficiency.       "

## 2024-06-05 ENCOUNTER — APPOINTMENT (OUTPATIENT)
Dept: PHYSICAL THERAPY | Facility: CLINIC | Age: 79
End: 2024-06-05
Payer: MEDICARE

## 2024-06-10 ENCOUNTER — TREATMENT (OUTPATIENT)
Dept: PHYSICAL THERAPY | Facility: CLINIC | Age: 79
End: 2024-06-10
Payer: MEDICARE

## 2024-06-10 DIAGNOSIS — M62.81 MUSCLE WEAKNESS (GENERALIZED): ICD-10-CM

## 2024-06-10 DIAGNOSIS — M17.0 BILATERAL PRIMARY OSTEOARTHRITIS OF KNEE: Primary | ICD-10-CM

## 2024-06-10 PROCEDURE — 97110 THERAPEUTIC EXERCISES: CPT | Performed by: PHYSICAL THERAPIST

## 2024-06-10 ASSESSMENT — PAIN SCALES - GENERAL: PAINLEVEL_OUTOF10: 6

## 2024-06-10 NOTE — PROGRESS NOTES
"Physical Therapy    Physical Therapy Treatment    Patient Name: Justine Muse  MRN: [id number]  Today's Date: 6/10/2024    Insurance: [employer]  Allowed visits: BMN    Subjective  Patient with some soreness in bilateral knees following initial session. She has been somewhat compliant with her HEP but has been active with walking and stair negotiation with attending her reunion in [place], [place]. She notes \"I feel like I'm beat up\".     Objective  Reviewed and progressed marked therapeutic exercise per patient tolerance (86710 - 40 minutes, 3 units) Incholas 5'   *Supine gastroc stretch 30\"x2  *Quad setting 5\"x10  *SLR x7 L/x10 R  Bridges x10  Ball squeeze 10\"x10  LAQ with ball squeeze 5\" 2x10  Sit to stands with UE assist 2x5    *added to HEP     Assessment  Patient challenged with upgrades but able to complete. Somewhat limited by low back pain but does well with position modifications. Encouraged compliance with HEP 1 time per day.     Plan  Continue per POC at this time.    "

## 2024-06-12 ENCOUNTER — APPOINTMENT (OUTPATIENT)
Dept: PHYSICAL THERAPY | Facility: CLINIC | Age: 79
End: 2024-06-12
Payer: MEDICARE

## 2024-06-12 DIAGNOSIS — M62.81 MUSCLE WEAKNESS (GENERALIZED): ICD-10-CM

## 2024-06-12 DIAGNOSIS — M17.0 BILATERAL PRIMARY OSTEOARTHRITIS OF KNEE: Primary | ICD-10-CM

## 2024-06-12 PROCEDURE — 97110 THERAPEUTIC EXERCISES: CPT | Performed by: PHYSICAL THERAPIST

## 2024-06-12 ASSESSMENT — PAIN SCALES - GENERAL: PAINLEVEL_OUTOF10: 4

## 2024-06-12 NOTE — PROGRESS NOTES
"Physical Therapy    Physical Therapy Treatment    Patient Name: Justine Muse  MRN: [id number]  Today's Date: 6/12/2024    Current Problem  1. Bilateral primary osteoarthritis of knee  Follow Up In Physical Therapy      2. Muscle weakness (generalized)          General  Reason for Referral: (P) Bilateral knee pain  Referred By: (P) Nitin Fischer MD    Insurance: [facility] Medicare  Allowed visits: BMN    Subjective  Patient with no new complaints today. She did well following her last visit. She does notice a difference in her symptoms with weather fluctuation.     Objective  Reviewed and progressed marked therapeutic exercise per patient tolerance (59103 - 40 minutes, 3 units) Nicholas 2' (increased left knee pain)  Supine gastroc stretch 30\"x3  Quad setting 5\"x10  SLR x10 L/x10 R  Bridges 2x10  *Supine clamshells YTB 2x10  Ball squeeze 10\"x12  LAQ with ball squeeze 5\" 2x10  Sit to stands with UE assist 2x5    *added to HEP    Assessment  Patient tolerated workout well with ability to progress volume of exercise today though some left knee and left hip pain elicited with recumbant biking today. Encouraged continued compliance with HEP.     Plan  Continue per POC at this time.      "

## 2024-06-17 ENCOUNTER — APPOINTMENT (OUTPATIENT)
Dept: PHYSICAL THERAPY | Facility: CLINIC | Age: 79
End: 2024-06-17
Payer: MEDICARE

## 2024-06-17 DIAGNOSIS — M17.0 BILATERAL PRIMARY OSTEOARTHRITIS OF KNEE: Primary | ICD-10-CM

## 2024-06-17 DIAGNOSIS — M62.81 MUSCLE WEAKNESS (GENERALIZED): ICD-10-CM

## 2024-06-17 PROCEDURE — 97110 THERAPEUTIC EXERCISES: CPT | Performed by: PHYSICAL THERAPIST

## 2024-06-17 ASSESSMENT — PAIN SCALES - GENERAL: PAINLEVEL_OUTOF10: 2

## 2024-06-17 NOTE — PROGRESS NOTES
"Physical Therapy    Physical Therapy Treatment    Patient Name: Justine Muse  MRN: [id number]  Today's Date: 6/17/2024    Current Problem  1. Bilateral primary osteoarthritis of knee  Follow Up In Physical Therapy      2. Muscle weakness (generalized)          General  Reason for Referral: Bilateral knee pain  Referred By: Nitin Fischer MD    Insurance: [facility] Medicare  Allowed visits: BMN     Subjective  Patient with no new complaints today. She was able to walk a mile and a half today. She has been compliant with her HEP. She reports good tolerance to last workout.     Objective  Reviewed and progressed marked therapeutic exercise per patient tolerance (69337 - 40 minutes, 3 units) Nicholas 5'   Supine gastroc stretch 30\"x3  Bridges 2x10  Quad setting 5\"x12  SLR x10 L/x10 R  Supine clamshells RTB 2x10  Ball squeeze 10\"x15  LAQ with ball squeeze 5\" 2x10  Sit to stands 2x5  *3 way hip YTB x10 ea      *added to HEP    Assessment  Patient with increased tolerance to recumbant biking today. She was able to perform sit to stands without UE assist today. Encouraged continued compliance with HEP.     Plan  Continue per POC at this time.        "

## 2024-06-19 ENCOUNTER — APPOINTMENT (OUTPATIENT)
Dept: PHYSICAL THERAPY | Facility: CLINIC | Age: 79
End: 2024-06-19
Payer: MEDICARE

## 2024-06-19 DIAGNOSIS — M17.0 BILATERAL PRIMARY OSTEOARTHRITIS OF KNEE: Primary | ICD-10-CM

## 2024-06-19 DIAGNOSIS — M62.81 MUSCLE WEAKNESS (GENERALIZED): ICD-10-CM

## 2024-06-19 PROCEDURE — 97110 THERAPEUTIC EXERCISES: CPT | Performed by: PHYSICAL THERAPIST

## 2024-06-19 ASSESSMENT — PAIN SCALES - GENERAL: PAINLEVEL_OUTOF10: 2

## 2024-06-19 NOTE — PROGRESS NOTES
"Physical Therapy    Physical Therapy Treatment    Patient Name: Justine Muse  MRN: [id number]  Today's Date: 6/19/2024    Current Problem  1. Bilateral primary osteoarthritis of knee  Follow Up In Physical Therapy      2. Muscle weakness (generalized)          General  Reason for Referral: Bilateral knee pain  Referred By: Nitin Fischer MD    Insurance: [facility] Medicare  Allowed visits: BMN    Subjective  Patient states \"my butt feels heavy today like I'm dragging it\". She does not have A/C in her home (outside temp is 96 today). She reports good tolerance to last workout stating \"I feel energized afterwards\" though does have some transient soreness the next day.     Objective  Reviewed and progressed marked therapeutic exercise per patient tolerance (07995 - 23 minutes, 2 units) Nicholas 5'   Bridges 2x10  Quad setting 5\"x15  SLR x8 L/x10 R  Supine clamshells RTB 2x12     Assessment  Patient developed some mild epistaxis upon sitting up from supine exercises. She denied HA but did have some light headedness that resolved following sitting for approximately 30 seconds. She took her medications as prescribed today. Decision was made to discontinue exercise and she was advised to call PCP/present to ER if symptoms worsen, if she develops a HA, or if she becomes more light headed. She did arrive nearly 30 minutes early for appointment and rested in lobby for that time prior to starting exercise. She declined need for water. She felt ok to leave clinic independently. Will F/U with patient via phone later on today.     Plan  Continue per POC at this time.    "

## 2024-06-24 ENCOUNTER — APPOINTMENT (OUTPATIENT)
Dept: PHYSICAL THERAPY | Facility: CLINIC | Age: 79
End: 2024-06-24
Payer: MEDICARE

## 2024-06-24 DIAGNOSIS — M62.81 MUSCLE WEAKNESS (GENERALIZED): ICD-10-CM

## 2024-06-24 DIAGNOSIS — M17.0 BILATERAL PRIMARY OSTEOARTHRITIS OF KNEE: Primary | ICD-10-CM

## 2024-06-24 PROCEDURE — 97110 THERAPEUTIC EXERCISES: CPT | Performed by: PHYSICAL THERAPIST

## 2024-06-24 ASSESSMENT — PAIN SCALES - GENERAL: PAINLEVEL_OUTOF10: 2

## 2024-06-24 NOTE — PROGRESS NOTES
"Physical Therapy    Physical Therapy Treatment    Patient Name: Justine Muse  MRN: [id number]  Today's Date: 6/24/2024    Current Problem  1. Bilateral primary osteoarthritis of knee  Follow Up In Physical Therapy      2. Muscle weakness (generalized)          General  Reason for Referral: Bilateral knee pain  Referred By: Nitin Fischer MD    Insurance: [facility] Medicare  Allowed visits: BMN    Subjective  Patient is feeling better today. She continues to have pain in her knees but was able to walk a quarter of a mile today. She has not had another episode of epistaxis since last visit. She monitors her BP regularly and is compliant with her medication; it does usually run low. She does take a blood thinner.     Objective  BP in right arm in sitting prior to exercise: 106/64 mmHg    Reviewed and progressed marked therapeutic exercise per patient tolerance (71875 - 40 minutes, 3 units) Nicholas 5'   Supine gastroc stretch 30\"x3  Bridges 2x10  Quad setting 5\"x15  SLR x10 L/x10 R  Supine clamshells RTB 2x10  Ball squeeze 10\"x15  LAQ with ball squeeze 5\" 2x10  Sit to stands 2x5  3 way hip YTB x15 ea     *added to HEP    Assessment  Much improved tolerance to exercise today. Able to progress in repetitions with several exercises. Encouraged continued compliance with HEP.     Plan  Continue per POC at this time.    "

## 2024-06-26 ENCOUNTER — APPOINTMENT (OUTPATIENT)
Dept: PHYSICAL THERAPY | Facility: CLINIC | Age: 79
End: 2024-06-26
Payer: MEDICARE

## 2024-06-26 DIAGNOSIS — M62.81 MUSCLE WEAKNESS (GENERALIZED): ICD-10-CM

## 2024-06-26 DIAGNOSIS — M17.0 BILATERAL PRIMARY OSTEOARTHRITIS OF KNEE: Primary | ICD-10-CM

## 2024-06-26 PROCEDURE — 97110 THERAPEUTIC EXERCISES: CPT | Performed by: PHYSICAL THERAPIST

## 2024-06-26 NOTE — PROGRESS NOTES
"Physical Therapy    Physical Therapy Treatment    Patient Name: Justine Muse  MRN: [id number]  Today's Date: 6/26/2024    Current Problem  1. Bilateral primary osteoarthritis of knee  Follow Up In Physical Therapy      2. Muscle weakness (generalized)          General  Reason for Referral: Bilateral knee pain  Referred By: Nitin Fischer MD    Insurance: [facility] Medicare  Allowed visits: BMN    Subjective  Patient with no new complaints. She was sore for \"a couple of hours\" following last session. She is feeling better overall.     Objective  Reviewed and progressed marked therapeutic exercise per patient tolerance (14321 - 40 minutes, 3 units) Nicholas 5'   Bridges 2x12  SLR x10 L/x10 R  Supine clamshells RTB 2x12  LAQ with ball squeeze 5\" 2x10  Sit to stands 2x5  3 way hip YTB x15 ea  Step ups R 4\"x10/L 2\"x10    Assessment  Challenged with upgrades but able to complete. She was able to progress repetitions with several exercises today. Increased pain in left knee with attempt at stepping up on 4\" step; this was better with 2\" step. Encouraged continued compliance with HEP.     Plan  Continue per POC at this time.    "

## 2024-07-01 ENCOUNTER — APPOINTMENT (OUTPATIENT)
Dept: PHYSICAL THERAPY | Facility: CLINIC | Age: 79
End: 2024-07-01
Payer: MEDICARE

## 2024-07-01 DIAGNOSIS — M17.0 BILATERAL PRIMARY OSTEOARTHRITIS OF KNEE: Primary | ICD-10-CM

## 2024-07-01 DIAGNOSIS — M62.81 MUSCLE WEAKNESS (GENERALIZED): ICD-10-CM

## 2024-07-01 PROCEDURE — 97110 THERAPEUTIC EXERCISES: CPT | Performed by: PHYSICAL THERAPIST

## 2024-07-01 NOTE — PROGRESS NOTES
"Physical Therapy    Physical Therapy Treatment    Patient Name: Justine Muse  MRN: [id number]  Today's Date: 7/1/2024    Current Problem  1. Bilateral primary osteoarthritis of knee  Follow Up In Physical Therapy      2. Muscle weakness (generalized)          General  Reason for Referral: Bilateral knee pain  Referred By: Nitin Fischer MD    Insurance: [facility] Medicare  Allowed visits: BMN    Subjective  Patient with no new complaints today. She was able to attend a theatre production over the weekend which involved a lot of walking. She reports good tolerance to last workout. She has continued to be compliant with her HEP.     Objective  Reviewed and progressed marked therapeutic exercise per patient tolerance (29532 - 38 minutes, 3 units) TM 3'   Bridges 2x12  SLR x10,x5 L/x10,x5 R  Supine clamshells RTB 2x12  LAQ with ball squeeze 5\" 2x10  Sit to stands 2x5  3 way hip YTB x15 ea  HR/TR 2x10 ea  Step ups R 4\"x10/L 2\"x10    NMR (94273 - 2 minutes) tandem balance bilaterally, 1' ea    Assessment  Patient fatigued with upgrades but able to complete. Challenged with tandem balance task. Encouraged continued compliance with HEP.     Plan  Continue per POC at this time.    "

## 2024-07-02 NOTE — PROGRESS NOTES
"Physical Therapy    Physical Therapy Re-Evaluation    Patient Name: Justine Muse  MRN: [id number]  Today's Date: 7/3/2024    Current Problem  1. Bilateral primary osteoarthritis of knee  Follow Up In Physical Therapy      2. Muscle weakness (generalized)          General  Reason for Referral: (P) Bilateral knee pain  Referred By: (P) Nitin Fischer MD    Insurance: [facility] Medicare  Allowed visits: BMN    Subjective  Patient with increased soreness in bilateral knees, generalized to anteromedial knees. She notes improved mobility overall since beginning PT. Chief complaint is continued soreness. Biggest limitation is \"I still can't do the steps like I want to\". She was very challenged with balance last visit and notes \"that was an eye opener\". She thinks her stamina has improved but still struggles with this. She has noticed an increased ability to perform sit to stand transfers.     Objective  Worst pain in the last 24 hours, 2/10.     Precautions: STEADI score is 8     Observation: FHP, excessive thoracic kyphosis.      Gait Assessment: ambulates with rollator with forward flexed posture and improved conchita.      AROM  Right knee flexion: 103, improved from 102 degrees    Left knee flexion: 107, improved from 105 degrees  Right knee extension: 0, improved from -5 degrees P!    Left knee extension: -5, decreased from -4 degrees     MMT  Right quadriceps: 5, improved from 4+    Left quadriceps: 5, improved from 4+  Right iliopsoas: 4+, improved from 4    Left iliopsoas: 4+, improved from 4  Unable to roll from supine to sidelying to right but can do so to the left, improved from being unable either to right or left  Unable to lay prone  Able to bridge for repetition     30 sec Sit to Stand: 9, improved from 0  Tinetti Gait and Balance: 20, improved from 15 (high risk)     LEFS: 13%    Re-evaluation performed on this date with new objective measurements obtained as above. See updated goals below.    Reviewed and " "progressed marked therapeutic exercise per patient tolerance (51308 - 40 minutes, 3 units) Nicholas 5'   Bridges 2x12  SLR x10;x5 L/x10;x5 R  Supine clamshells RTB 2x12  LAQ with ball squeeze 1 lb 2x10  3 way hip YTB x15 ea  Step ups R 4\"x10/L 2\"x10    Assessment  Patient with improvement in AROM, strength, and endurance. She demonstrate a decreased fall risk and improved functional strength.    Plan  Recommending continued PT management 1x/week for 4 weeks, 4 visits.     Goals  Independent with HEP to expedite progress and promote goal achievement - partially met  Increase LEFS to > or equal to 50% functional for increased functional ability - not assessed this date  Increase strength bilateral iliopsoas to 4+/5 and bilateral gluteus medius to > or equal to 4/5 for improved gait mechanics and decreased fall risk - partially met  Patient demonstrate ability to perform > or equal to 5 sit to stands in 30 seconds for evidence of improved functional strength - met  Increase Tinetti score to > or equal to 20 for evidence of decreased fall risk - met  Patient ambulate > or equal to 50 feet with LRAD with more upright posture and increased conchita of improved gait efficiency - partially met    "

## 2024-07-03 ENCOUNTER — APPOINTMENT (OUTPATIENT)
Dept: PHYSICAL THERAPY | Facility: CLINIC | Age: 79
End: 2024-07-03
Payer: MEDICARE

## 2024-07-03 DIAGNOSIS — M62.81 MUSCLE WEAKNESS (GENERALIZED): ICD-10-CM

## 2024-07-03 DIAGNOSIS — M17.0 BILATERAL PRIMARY OSTEOARTHRITIS OF KNEE: Primary | ICD-10-CM

## 2024-07-03 PROCEDURE — 97110 THERAPEUTIC EXERCISES: CPT | Performed by: PHYSICAL THERAPIST

## 2024-07-03 ASSESSMENT — PAIN SCALES - GENERAL: PAINLEVEL_OUTOF10: 2

## 2024-07-10 ENCOUNTER — APPOINTMENT (OUTPATIENT)
Dept: PHYSICAL THERAPY | Facility: CLINIC | Age: 79
End: 2024-07-10
Payer: MEDICARE

## 2024-07-16 ENCOUNTER — TREATMENT (OUTPATIENT)
Dept: PHYSICAL THERAPY | Facility: CLINIC | Age: 79
End: 2024-07-16
Payer: MEDICARE

## 2024-07-16 ENCOUNTER — APPOINTMENT (OUTPATIENT)
Dept: PHYSICAL THERAPY | Facility: CLINIC | Age: 79
End: 2024-07-16
Payer: MEDICARE

## 2024-07-16 DIAGNOSIS — M62.81 MUSCLE WEAKNESS (GENERALIZED): ICD-10-CM

## 2024-07-16 DIAGNOSIS — M17.0 BILATERAL PRIMARY OSTEOARTHRITIS OF KNEE: Primary | ICD-10-CM

## 2024-07-16 PROCEDURE — 97110 THERAPEUTIC EXERCISES: CPT | Performed by: PHYSICAL THERAPIST

## 2024-07-16 NOTE — PROGRESS NOTES
"Physical Therapy    Physical Therapy Treatment    Patient Name: Justine Muse  MRN: [id number]  Today's Date: 7/16/2024    Current Problem  1. Bilateral primary osteoarthritis of knee        2. Muscle weakness (generalized)          General  Reason for Referral: Bilateral knee pain  Referred By: Nitin Fischer MD    Insurance: [facility] Medicare  Allowed visits: BMN    Subjective  Patient with some increased knee pain this AM. She does notice a change in symptoms with weather fluctuations. She has been compliant with her HEP but has had some stomach issues over the last week or so. She will see her PCP in September.     Objective  Reviewed and progressed marked therapeutic exercise per patient tolerance (14920 - 33 minutes, 2 units) Nicholas 5'   *Heel slides 10\"x5  Bridges 2x12  SLR x10;x5 L/x10;x5 R  Supine clamshells RTB 2x12  LAQ with ball squeeze 1 lb 2x10  3 way hip RTB x15 ea    *added to HEP    Assessment  Patient tolerated workout well though fatigued by end of session. She did not want to perform step-ups today but she will try them at home later. Encouraged continued compliance with HEP.     Plan  Continue per POC at this time.      "

## 2024-07-23 ENCOUNTER — APPOINTMENT (OUTPATIENT)
Dept: PHYSICAL THERAPY | Facility: CLINIC | Age: 79
End: 2024-07-23
Payer: MEDICARE

## 2024-07-30 ENCOUNTER — APPOINTMENT (OUTPATIENT)
Dept: PHYSICAL THERAPY | Facility: CLINIC | Age: 79
End: 2024-07-30
Payer: MEDICARE

## 2024-07-30 DIAGNOSIS — M17.0 BILATERAL PRIMARY OSTEOARTHRITIS OF KNEE: Primary | ICD-10-CM

## 2024-07-30 DIAGNOSIS — M62.81 MUSCLE WEAKNESS (GENERALIZED): ICD-10-CM

## 2024-07-30 PROCEDURE — 97110 THERAPEUTIC EXERCISES: CPT | Performed by: PHYSICAL THERAPIST

## 2024-07-30 ASSESSMENT — PAIN SCALES - GENERAL: PAINLEVEL_OUTOF10: 2

## 2024-07-30 NOTE — PROGRESS NOTES
"Physical Therapy    Physical Therapy Treatment    Patient Name: Justine Muse  MRN: [id number]  Today's Date: 7/30/2024    Current Problem  1. Bilateral primary osteoarthritis of knee        2. Muscle weakness (generalized)          General  Reason for Referral: (P) Bilateral knee pain  Referred By: (P) Nitin Fischer MD    Insurance: [facility] Medicare  Allowed visits: BMN    Subjective  Patient with no new complaints today. She has been compliant with her HEP. She feels as though her balance has improved as she is able to walk short distances in her home without an assistive device. Somewhat less pain reported today.     Objective  Reviewed and progressed marked therapeutic exercise per patient tolerance (30353 - 40 minutes, 3 units) Nicholas 5'   Heel slides 10\"x5  Bridges 2x12  SLR 2x10 ea  Supine clamshells RTB 2x15  LAQ with ball squeeze 1 lb 2x10  Step ups R 4\" 2x10/L 2\"   3 way hip RTB x15 ea  Standing HR 2x10    Assessment  Improved endurance evident overall today. Able to progress volume of standing exercises though was fatigued at end of session. Encouraged continued compliance with HEP.     Plan  Continue per POC at this time.    "

## 2024-08-09 ENCOUNTER — APPOINTMENT (OUTPATIENT)
Dept: PHYSICAL THERAPY | Facility: CLINIC | Age: 79
End: 2024-08-09
Payer: MEDICARE

## 2024-08-09 ENCOUNTER — DOCUMENTATION (OUTPATIENT)
Dept: PHYSICAL THERAPY | Facility: CLINIC | Age: 79
End: 2024-08-09

## 2024-08-09 NOTE — PROGRESS NOTES
Physical Therapy                 Therapy Communication Note    Patient Name: Justine Muse  MRN: [id number]  Today's Date: 8/9/2024     Discipline: Physical Therapy    Missed Visit Reason: Patient cancelled scheduled F/U appointment for this PM.     Missed Time: Cancel

## 2024-08-12 ENCOUNTER — APPOINTMENT (OUTPATIENT)
Dept: PHYSICAL THERAPY | Facility: CLINIC | Age: 79
End: 2024-08-12
Payer: MEDICARE

## 2024-08-13 NOTE — PROGRESS NOTES
"Physical Therapy    Physical Therapy Re-Evaluation    Patient Name: Justine Muse  MRN: [id number]  Today's Date: 8/14/2024    Current Problem  1. Bilateral primary osteoarthritis of knee        2. Muscle weakness (generalized)          General  Reason for Referral: Bilateral knee pain  Referred By: Nitin Fischer MD    Insurance: [facility]  Allowed visits: BMN    Subjective  Patient with improvement in bilateral knees. She has been compliant with her HEP. Chief complaint is \"I can't put weight on the left\" to negotiate steps. She has been practicing with a 2 inch step and uses stairs at park with bilateral railings. Biggest limitation is \"steps\" and she has noticed some decreased endurance. She will F/U with Dr. Fischer when she needs \"a shot\". She is satisfied with progress of her knees at this time.     Objective  Worst pain in the last 24 hours, 4 increased from 2/10.      Precautions: STEADI score is 8     Observation: FHP, excessive thoracic kyphosis.      Gait Assessment: ambulates with rollator with somewhat forward flexed posture and improved conchita.      AROM  Right knee flexion: 106, improved from 103 degrees    Left knee flexion: 113, improved from 107 degrees  Right knee extension: -4, decreased from 0 degrees P!    Left knee extension: -4, decreased from -5 degrees     MMT  Right quadriceps: 5    Left quadriceps: 5, improved from 4+  Right iliopsoas: 4, decreased from 4+    Left iliopsoas: 4+  Right gluteus medius: 4-   Unable to roll from supine to sidelying to right   Unable to lay prone  Able to bridge for repetition     30 sec Sit to Stand: 8, decreased from 9  Tinetti Gait and Balance: 21, improved from 20 (high risk)     LEFS: 35%, improved from 13%     Re-evaluation performed on this date with new objective measurements obtained as above. See updated goals below.     Reviewed and progressed marked therapeutic exercise per patient tolerance (70424 - 40 minutes, 3 units) Bridges 2x12  SLR " "x10;x5 L/x10;x5 R  Supine clamshells GTB 2x10  LAQ with ball squeeze 1 lb 2x10  Step ups R 4\" 2x10/L 2\"x10    3 way hip RTB x10 ea  Standing HR 2x10    Assessment  Patient with improvement in functional strength and endurance as well as pain levels. She understands importance of continued compliance with HEP to maintain therapeutic gains and mitigate fall risk. Recommending discharge from this episode of care.     Plan  No further visits planned at this time.     Goals  Independent with HEP to expedite progress and promote goal achievement - partially met  Increase LEFS to > or equal to 50% functional for increased functional ability - partially met  Increase strength bilateral iliopsoas to 4+/5 and bilateral gluteus medius to > or equal to 4/5 for improved gait mechanics and decreased fall risk - partially met  Patient demonstrate ability to perform > or equal to 5 sit to stands in 30 seconds for evidence of improved functional strength - met  Increase Tinetti score to > or equal to 20 for evidence of decreased fall risk - met  Patient ambulate > or equal to 50 feet with LRAD with more upright posture and increased conchita of improved gait efficiency - met      "

## 2024-08-14 ENCOUNTER — APPOINTMENT (OUTPATIENT)
Dept: PHYSICAL THERAPY | Facility: CLINIC | Age: 79
End: 2024-08-14
Payer: MEDICARE

## 2024-08-14 DIAGNOSIS — M17.0 BILATERAL PRIMARY OSTEOARTHRITIS OF KNEE: Primary | ICD-10-CM

## 2024-08-14 DIAGNOSIS — M62.81 MUSCLE WEAKNESS (GENERALIZED): ICD-10-CM

## 2024-08-14 PROCEDURE — 97110 THERAPEUTIC EXERCISES: CPT | Performed by: PHYSICAL THERAPIST

## 2024-08-14 ASSESSMENT — PAIN SCALES - GENERAL: PAINLEVEL_OUTOF10: 2

## 2025-01-30 NOTE — PROGRESS NOTES
History: Justine is here for her bilateral knees. She received gel injections about 8-9 months ago and her pain just started to return. She takes Tylenol as needed.     Past medical history: Multiple  Medications: Multiple  Allergies: No known drug allergies    Please refer to the intake H&P regarding the patient's review of systems, family history and social history as was done today    HEENT: Normal  Lungs: Clear to auscultation  Heart: RRR  Abdomen: Soft, nontender  Skin: clear  Extremity: She has a bit more swelling on the left than the right.  1+ crepitus with range of motion.  Negative Lachman and posterior drawer.  Mild laxity varus stress.  No numbness today.  Contralateral exam is normal for strength, motion, stability and neurovascular assessment.    Radiographs: Previous x-rays show end-stage knee DJD bilaterally    Assessment: Severe bilateral Knee DJD    Plan:   The patient did well with gel injections on her last visit. We dicussed knee arthroplasty but she would like to hold off. We ordered the gel.  We will see her back for injections upon approval.    All questions were answered today with the patient.    This note was generated with voice recognition software and may contain grammatical errors.    Scribe Attestation  By signing my name below, IGriffin Scribe   attest that this documentation has been prepared under the direction and in the presence of Nitin Fischer MD.

## 2025-01-31 ENCOUNTER — OFFICE VISIT (OUTPATIENT)
Dept: ORTHOPEDIC SURGERY | Facility: CLINIC | Age: 80
End: 2025-01-31
Payer: MEDICARE

## 2025-01-31 DIAGNOSIS — M17.0 BILATERAL PRIMARY OSTEOARTHRITIS OF KNEE: Primary | ICD-10-CM

## 2025-02-28 ENCOUNTER — APPOINTMENT (OUTPATIENT)
Dept: ORTHOPEDIC SURGERY | Facility: CLINIC | Age: 80
End: 2025-02-28
Payer: MEDICARE

## 2025-02-28 DIAGNOSIS — M17.0 BILATERAL PRIMARY OSTEOARTHRITIS OF KNEE: Primary | ICD-10-CM

## 2025-02-28 NOTE — PROGRESS NOTES
Justine is here for bilateral Synvisc one injection    L Inj/Asp: bilateral knee on 2/28/2025 10:43 AM  Indications: pain  Details: 18 G needle, lateral approach  Medications (Right): 48 mg hylan 48 mg/6 mL  Medications (Left): 48 mg hylan 48 mg/6 mL  Outcome: tolerated well, no immediate complications  Procedure, treatment alternatives, risks and benefits explained, specific risks discussed. Consent was given by the patient. Immediately prior to procedure a time out was called to verify the correct patient, procedure, equipment, support staff and site/side marked as required. Patient was prepped and draped in the usual sterile fashion.         She will ice and work on range of motion and exercises as directed.    She will follow up on an as-needed basis.    Scribe Attestation  By signing my name below, IGriffin, Scrjarod   attest that this documentation has been prepared under the direction and in the presence of Nitin Fischer MD.

## 2025-07-25 ENCOUNTER — HOSPITAL ENCOUNTER (OUTPATIENT)
Dept: PHYSICAL THERAPY | Age: 80
Setting detail: THERAPIES SERIES
Discharge: HOME | End: 2025-07-25
Payer: MEDICARE

## 2025-07-25 PROCEDURE — 97530 THERAPEUTIC ACTIVITIES: CPT

## 2025-07-25 PROCEDURE — 97110 THERAPEUTIC EXERCISES: CPT

## 2025-07-25 PROCEDURE — 97162 PT EVAL MOD COMPLEX 30 MIN: CPT

## 2025-07-25 ASSESSMENT — PAIN DESCRIPTION - DESCRIPTORS: DESCRIPTORS: ACHING;DISCOMFORT;SHARP;PRESSURE

## 2025-07-25 ASSESSMENT — PAIN DESCRIPTION - LOCATION: LOCATION: BACK

## 2025-07-25 ASSESSMENT — PAIN DESCRIPTION - PAIN TYPE: TYPE: CHRONIC PAIN

## 2025-07-25 ASSESSMENT — PAIN DESCRIPTION - ORIENTATION: ORIENTATION: RIGHT;LEFT

## 2025-07-25 ASSESSMENT — PAIN SCALES - GENERAL: PAINLEVEL_OUTOF10: 4

## 2025-07-25 NOTE — THERAPY EVALUATION
Physical Therapy: Initial Evaluation    Patient: Abby Thrasher (80 y.o. female)   Examination Date: 2025  Plan of Care Certification Period: 2025 to 25      :  1945 ;    Confirmed: Yes MRN: [id number]  CSN: [id number]   Insurance: [facility]or: [facility] MEDICARE / Plan: [facility] MEDICARE-ADVANTAGE PPO / Product Type: Medicare /   Insurance ID: [id number] - (Medicare Managed) Secondary Insurance (if applicable):    Referring Physician: Singh Garay, APRN - CNP KELI Cuello/FARHAT   PCP: Dominick Nicholson MD Visits to Date/Visits Approved: 1 / 10    No Show/Cancelled Appts: 0      Medical Diagnosis: Low back pain, unspecified [M54.50]  Other chronic pain [G89.29]  Bilateral primary osteoarthritis of knee [M17.0] chronic LBP R>L, without sciatica; B knee oa and pain,  and difficulty walking  Treatment Diagnosis: Chroinc LBP without sciatica, B knee OA with pain and difficulty walking     PERTINENT MEDICAL HISTORY           Medical History:     Past Medical History:   Diagnosis Date    Cancer (HCC)     bladder, breast,     CHF (congestive heart failure) (HCC)     Hyperlipidemia     Hypertension     Osteoarthritis     Type II or unspecified type diabetes mellitus without mention of complication, not stated as uncontrolled      Surgical History:   Past Surgical History:   Procedure Laterality Date    BLADDER SURGERY      cancer    BREAST SURGERY      HERNIA REPAIR      HYSTERECTOMY (CERVIX STATUS UNKNOWN)      UPPER GASTROINTESTINAL ENDOSCOPY N/A 2022    EGD ESOPHAGOGASTRODUODENOSCOPY performed by Gilbert Horton MD at [facility] OR       Medications:   Current Outpatient Medications:     acetaminophen (TYLENOL) 500 MG tablet, Take 1 tablet by mouth 4 times daily as needed for Pain, Disp: 120 tablet, Rfl: 0    pantoprazole (PROTONIX) 40 MG tablet, Take 1 tablet by mouth every morning (before breakfast), Disp: 30 tablet, Rfl: 3    ferrous sulfate (IRON 325) 325 (65 Fe) MG tablet, Take 1 tablet by

## 2025-07-28 ENCOUNTER — HOSPITAL ENCOUNTER (OUTPATIENT)
Dept: PHYSICAL THERAPY | Age: 80
Setting detail: THERAPIES SERIES
Discharge: HOME | End: 2025-07-28
Payer: MEDICARE

## 2025-07-28 PROCEDURE — 97110 THERAPEUTIC EXERCISES: CPT

## 2025-07-28 PROCEDURE — 97140 MANUAL THERAPY 1/> REGIONS: CPT

## 2025-07-28 NOTE — PROGRESS NOTES
Physical Therapy  Physical Therapy: Daily Note   Patient: Abby Thrasher (80 y.o. female)   Examination Date: 2025  Plan of Care/Certification Expiration Date: 25    No data recorded   :  1945 # of Visits since SOC:   2   MRN: [id number]  CSN: [id number] Start of Care Date:   2025   Insurance: Payor: [facility] MEDICARE / Plan: [facility] MEDICARE-ADVANTAGE PPO / Product Type: Medicare /   Insurance ID: [id number] - (Medicare Managed) Secondary Insurance (if applicable):    Referring Physician: Singh Garay, APRN - KELI Salgado/FARHAT   PCP: Dominick Nicholson MD Visits to Date/Visits Approved: 2 /      No Show/Cancelled Appts: 0 / 0     Medical Diagnosis: Low back pain, unspecified [M54.50]  Other chronic pain [G89.29]  Bilateral primary osteoarthritis of knee [M17.0] chronic LBP R>L, without sciatica; B knee oa and pain,  and difficulty walking  Treatment Diagnosis: Chroinc LBP without sciatica, B knee OA with pain and difficulty walking        SUBJECTIVE EXAMINATION   Pain Level:      Patient Comments: Subjective: I am here for back and B knee pain. No back pain right now. My B knees hurt 8/10.    HEP Compliance: Fair        OBJECTIVE EXAMINATION   Restrictions:  No data recorded No data recorded No data recorded              TREATMENT     Exercises:      Treatment Reasoning    Exercise 1: 5 sit to stand to sit with 2 armrests in 21.86 seconds  Exercise 2: tall sit, no back support  with adductor ball, alternating LAQ left and right x 5 reps each leg x 2 sets  Exercise 3: Supported stand hip abduction 2x5'  Exercise 4: Supported stand hip extension 2x5'  Exercise 5: Supported stand heel raises x10'  Exercise 6: Seated toe raises (DF) x 20'  Exercise 7: Seated SLR x 5'    Limitations addressed: Mobility, Strength, Flexibility, Activity tolerance, Posture, Pain modulation                     Manual Therapy: (CPT 35553) Treatment Reasoning     Other: No reaction to KT tape reported on forearm.

## 2025-08-01 ENCOUNTER — HOSPITAL ENCOUNTER (OUTPATIENT)
Dept: PHYSICAL THERAPY | Age: 80
Setting detail: THERAPIES SERIES
Discharge: HOME | End: 2025-08-01
Payer: MEDICARE

## 2025-08-01 PROCEDURE — 97116 GAIT TRAINING THERAPY: CPT

## 2025-08-01 PROCEDURE — 97110 THERAPEUTIC EXERCISES: CPT

## 2025-08-01 ASSESSMENT — PAIN DESCRIPTION - PAIN TYPE: TYPE: CHRONIC PAIN

## 2025-08-01 ASSESSMENT — PAIN DESCRIPTION - ORIENTATION: ORIENTATION: RIGHT;LEFT

## 2025-08-01 ASSESSMENT — PAIN DESCRIPTION - DESCRIPTORS: DESCRIPTORS: ACHING

## 2025-08-01 ASSESSMENT — PAIN DESCRIPTION - LOCATION: LOCATION: KNEE

## 2025-08-01 ASSESSMENT — PAIN SCALES - GENERAL: PAINLEVEL_OUTOF10: 6

## 2025-08-01 NOTE — PROGRESS NOTES
bridge x 5 (limited ROM)  Exercise 12: standing marches x 10 (alt) H2-3  Exercise 13: SLS 1-2 UE support x 5 each side. H10  Exercise 14: Tandem stance x 5 each foot leading, H10                          Gait: (CPT 70904)  Treatment Reasoning    Gait Training 1: Pt ambulated 400' with personal rollator w/ VC's needed for more upright posture. Pt demo's greater trendelenburg gait on L> R                     Pt Education: Additional Comments: KT tape tiral L forearm 7/25/25       ASSESSMENT     Assessment: Assessment: Pt had 6/10 B knee pain at arrival and states no increase post. Added new Ther Ex today with 4 sent home for HEP including SLS, alternating marches, hip extension and LAQ's. Pt educated on safety with exercises at home as well as to not push through pain. Added a couple supine LB strengthening Ther Ex with pt having some difficulty but able to complete 5 reps as well with mini squats. Pt really wants to work on balance and will try to encorporate some postural strengthening as well next visit. Pt ambulated 400' with rollator, demo'ing some slight forward flexion and trendelenburg gait L>R.  Body Structures, Functions, Activity Limitations Requiring Skilled Therapeutic Intervention: Decreased functional mobility , Decreased tolerance to work activity, Decreased strength, Decreased balance, Increased pain, Decreased posture    Post-Treatment Pain Level:      Activity Tolerance: Patient limited by pain    Therapy Prognosis: Good       GOALS   Patient Goals : \"I want to be stornger and walkign better, be able to use the cane sometimes. \"  Short Term Goals Completed by 1-2 wks Current Status Goal Status   Pt reporting HEP at least once a day 5/7 days to gain strengtha dn dec pain in B knees and lumbar.       5 sit to stand to sit with 2 armrests in <= 20 seconds       Ambulate one lap ( 440ft) in <4 minutes with 4 ww and pain post in knees <= 6/10 , lumbar <= 3/10 per pt report.

## 2025-08-04 ENCOUNTER — HOSPITAL ENCOUNTER (OUTPATIENT)
Dept: PHYSICAL THERAPY | Age: 80
Setting detail: THERAPIES SERIES
Discharge: HOME | End: 2025-08-04
Payer: MEDICARE

## 2025-08-04 PROCEDURE — 97110 THERAPEUTIC EXERCISES: CPT

## 2025-08-04 PROCEDURE — 97140 MANUAL THERAPY 1/> REGIONS: CPT

## 2025-08-08 ENCOUNTER — HOSPITAL ENCOUNTER (OUTPATIENT)
Dept: PHYSICAL THERAPY | Age: 80
Setting detail: THERAPIES SERIES
Discharge: HOME | End: 2025-08-08
Payer: MEDICARE

## 2025-08-08 PROCEDURE — 97110 THERAPEUTIC EXERCISES: CPT

## 2025-08-13 ENCOUNTER — HOSPITAL ENCOUNTER (OUTPATIENT)
Dept: PHYSICAL THERAPY | Age: 80
Setting detail: THERAPIES SERIES
Discharge: HOME | End: 2025-08-13
Payer: MEDICARE

## 2025-08-15 ENCOUNTER — HOSPITAL ENCOUNTER (OUTPATIENT)
Dept: PHYSICAL THERAPY | Age: 80
Setting detail: THERAPIES SERIES
Discharge: HOME | End: 2025-08-15
Payer: MEDICARE

## 2025-08-15 PROCEDURE — 97116 GAIT TRAINING THERAPY: CPT

## 2025-08-15 PROCEDURE — 97110 THERAPEUTIC EXERCISES: CPT

## 2025-08-18 ENCOUNTER — HOSPITAL ENCOUNTER (OUTPATIENT)
Dept: PHYSICAL THERAPY | Age: 80
Setting detail: THERAPIES SERIES
Discharge: HOME | End: 2025-08-18
Payer: MEDICARE

## 2025-08-18 PROCEDURE — 97110 THERAPEUTIC EXERCISES: CPT

## 2025-08-22 ENCOUNTER — HOSPITAL ENCOUNTER (OUTPATIENT)
Dept: PHYSICAL THERAPY | Age: 80
Setting detail: THERAPIES SERIES
Discharge: HOME | End: 2025-08-22
Payer: MEDICARE

## 2025-08-22 PROCEDURE — 97110 THERAPEUTIC EXERCISES: CPT

## 2025-08-22 PROCEDURE — 97530 THERAPEUTIC ACTIVITIES: CPT

## 2025-08-22 ASSESSMENT — PAIN DESCRIPTION - DESCRIPTORS: DESCRIPTORS: ACHING

## 2025-08-22 ASSESSMENT — PAIN DESCRIPTION - ORIENTATION: ORIENTATION: LEFT

## 2025-08-22 ASSESSMENT — PAIN DESCRIPTION - PAIN TYPE: TYPE: CHRONIC PAIN

## 2025-08-22 ASSESSMENT — PAIN SCALES - GENERAL: PAINLEVEL_OUTOF10: 3

## 2025-08-22 ASSESSMENT — PAIN DESCRIPTION - LOCATION: LOCATION: KNEE

## 2025-08-25 ENCOUNTER — HOSPITAL ENCOUNTER (OUTPATIENT)
Dept: PHYSICAL THERAPY | Age: 80
Setting detail: THERAPIES SERIES
Discharge: HOME | End: 2025-08-25
Payer: MEDICARE

## 2025-08-25 PROCEDURE — 97530 THERAPEUTIC ACTIVITIES: CPT

## 2025-08-25 ASSESSMENT — PAIN DESCRIPTION - LOCATION: LOCATION: KNEE

## 2025-08-25 ASSESSMENT — PAIN DESCRIPTION - ORIENTATION: ORIENTATION: LEFT

## 2025-08-25 ASSESSMENT — PAIN DESCRIPTION - DESCRIPTORS: DESCRIPTORS: ACHING

## 2025-08-25 ASSESSMENT — PAIN DESCRIPTION - PAIN TYPE: TYPE: CHRONIC PAIN

## 2025-08-25 ASSESSMENT — PAIN SCALES - GENERAL: PAINLEVEL_OUTOF10: 2

## 2025-08-28 ENCOUNTER — HOSPITAL ENCOUNTER (OUTPATIENT)
Dept: PHYSICAL THERAPY | Age: 80
Setting detail: THERAPIES SERIES
Discharge: HOME | End: 2025-08-28
Payer: MEDICARE

## 2025-08-28 PROCEDURE — 97530 THERAPEUTIC ACTIVITIES: CPT

## 2025-08-28 PROCEDURE — 97110 THERAPEUTIC EXERCISES: CPT

## 2025-09-04 ENCOUNTER — HOSPITAL ENCOUNTER (OUTPATIENT)
Dept: PHYSICAL THERAPY | Age: 80
Setting detail: THERAPIES SERIES
Discharge: HOME | End: 2025-09-04
Payer: MEDICARE

## 2025-09-04 PROCEDURE — 97110 THERAPEUTIC EXERCISES: CPT

## (undated) DEVICE — ENDO CARRY-ON PROCEDURE KIT INCLUDES LUBRICANT, DEFENDO OLYMPUS AIR, WATER, SUCTION, BIOPSY VALVE KIT, ENZYMATIC SPONGE, AND BASIN.: Brand: ENDO CARRY-ON PROCEDURE KIT

## (undated) DEVICE — TRAYS TRANSPORT SCOPE OASIS W/LID

## (undated) DEVICE — 4-PORT MANIFOLD: Brand: NEPTUNE 2

## (undated) DEVICE — MEDI-VAC NON-CONDUCTIVE SUCTION TUBING: Brand: CARDINAL HEALTH

## (undated) DEVICE — BLOCK BITE PEDIATRIC 14 MM MOUTHPIECE POLYETH ENDO-GUARD LTX 100428] BARD INC]

## (undated) DEVICE — BW-412T DISP COMBO CLEANING BRUSH: Brand: SINGLE USE COMBINATION CLEANING BRUSH